# Patient Record
Sex: FEMALE | Race: WHITE | Employment: FULL TIME | URBAN - METROPOLITAN AREA
[De-identification: names, ages, dates, MRNs, and addresses within clinical notes are randomized per-mention and may not be internally consistent; named-entity substitution may affect disease eponyms.]

---

## 2017-05-16 DIAGNOSIS — E05.00 GRAVES DISEASE: ICD-10-CM

## 2017-05-16 RX ORDER — LEVOTHYROXINE SODIUM 175 UG/1
TABLET ORAL
Qty: 90 TABLET | Refills: 0 | Status: SHIPPED | OUTPATIENT
Start: 2017-05-16 | End: 2017-10-19

## 2017-05-16 NOTE — TELEPHONE ENCOUNTER
Levothyroxine     Last Written Prescription Date: 11/22/16  Last Quantity: 90, # refills: 0  Last Office Visit with G, P or [facility] prescribing provider: 11/22/16        TSH   Date Value Ref Range Status   11/22/2016 11.99 (H) 0.40 - 4.00 mU/L Final     Last refill should have only lasted until end of February 2017.    Routing refill request to provider for review/approval because:  Labs out of range:  TSH 11.99

## 2017-10-19 DIAGNOSIS — E05.00 GRAVES DISEASE: ICD-10-CM

## 2017-10-20 RX ORDER — LEVOTHYROXINE SODIUM 175 UG/1
TABLET ORAL
Qty: 90 TABLET | Refills: 0 | Status: SHIPPED | OUTPATIENT
Start: 2017-10-20 | End: 2018-01-20

## 2017-10-20 NOTE — TELEPHONE ENCOUNTER
Levothyroxine       Last Written Prescription Date:  5/16/17  Last Fill Quantity: 90,   # refills: 0  Future Office visit:       Routing refill request to provider for review/approval because:  Thyroid Protocol Failed

## 2018-01-10 ENCOUNTER — DOCUMENTATION ONLY (OUTPATIENT)
Dept: SURGERY | Facility: CLINIC | Age: 48
End: 2018-01-10

## 2018-01-20 DIAGNOSIS — E05.00 GRAVES DISEASE: ICD-10-CM

## 2018-01-24 NOTE — TELEPHONE ENCOUNTER
"Requested Prescriptions   Pending Prescriptions Disp Refills     levothyroxine (SYNTHROID/LEVOTHROID) 175 MCG tablet [Pharmacy Med Name: LEVOTHYROXINE 175 MCG TABLET] 90 tablet 0     Sig: **MAKE APPT, NO MORE REFILLS** TAKE 1 TABLET (175 MCG) BY MOUTH DAILY    Thyroid Protocol Failed    1/23/2018  5:25 PM       Failed - Recent or future visit with authorizing provider's specialty    Patient had office visit in the last year or has a visit in the next 30 days with authorizing provider.  See \"Patient Info\" tab in inbasket, or \"Choose Columns\" in Meds & Orders section of the refill encounter.            Failed - Normal TSH on file in past 12 months    Recent Labs   Lab Test  11/22/16   1224   TSH  11.99*             Passed - Patient is 12 years or older       Passed - No active pregnancy on record    If patient is pregnant or has had a positive pregnancy test, please check TSH.         Passed - No positive pregnancy test in past 12 months    If patient is pregnant or has had a positive pregnancy test, please check TSH.            Pt is over-due for OV and labs.  Was given 1 refill 10-20-17 with note: make appt, no more refills.    Left message for pt to call back.    Will refill x 1 mo after appt scheduled.  "

## 2018-01-31 RX ORDER — LEVOTHYROXINE SODIUM 175 UG/1
TABLET ORAL
Qty: 30 TABLET | Refills: 0 | Status: SHIPPED | OUTPATIENT
Start: 2018-01-31 | End: 2018-06-12

## 2018-01-31 NOTE — TELEPHONE ENCOUNTER
Routing refill request to provider for review/approval because:  Johanna given x1 and patient did not follow up, please advise  Labs out of range:  tsh  Labs not current:  tsh  Patient needs to be seen because it has been more than 1 year since last office visit.

## 2018-06-12 ENCOUNTER — OFFICE VISIT (OUTPATIENT)
Dept: INTERNAL MEDICINE | Facility: CLINIC | Age: 48
End: 2018-06-12
Payer: COMMERCIAL

## 2018-06-12 VITALS
DIASTOLIC BLOOD PRESSURE: 68 MMHG | SYSTOLIC BLOOD PRESSURE: 108 MMHG | HEART RATE: 77 BPM | HEIGHT: 66 IN | BODY MASS INDEX: 29.59 KG/M2 | WEIGHT: 184.1 LBS | OXYGEN SATURATION: 100 % | RESPIRATION RATE: 16 BRPM | TEMPERATURE: 98.1 F

## 2018-06-12 DIAGNOSIS — Z00.00 ENCOUNTER FOR ROUTINE ADULT HEALTH EXAMINATION WITHOUT ABNORMAL FINDINGS: Primary | ICD-10-CM

## 2018-06-12 DIAGNOSIS — M72.2 PLANTAR FASCIITIS: ICD-10-CM

## 2018-06-12 DIAGNOSIS — F51.01 PRIMARY INSOMNIA: ICD-10-CM

## 2018-06-12 DIAGNOSIS — E05.00 GRAVES DISEASE: ICD-10-CM

## 2018-06-12 DIAGNOSIS — F32.0 MILD MAJOR DEPRESSION (H): ICD-10-CM

## 2018-06-12 LAB
BASOPHILS # BLD AUTO: 0 10E9/L (ref 0–0.2)
BASOPHILS NFR BLD AUTO: 0.4 %
DIFFERENTIAL METHOD BLD: ABNORMAL
EOSINOPHIL # BLD AUTO: 0.3 10E9/L (ref 0–0.7)
EOSINOPHIL NFR BLD AUTO: 3.7 %
ERYTHROCYTE [DISTWIDTH] IN BLOOD BY AUTOMATED COUNT: 15.2 % (ref 10–15)
HCT VFR BLD AUTO: 36.3 % (ref 35–47)
HGB BLD-MCNC: 11.5 G/DL (ref 11.7–15.7)
LYMPHOCYTES # BLD AUTO: 2.1 10E9/L (ref 0.8–5.3)
LYMPHOCYTES NFR BLD AUTO: 30.1 %
MCH RBC QN AUTO: 27.4 PG (ref 26.5–33)
MCHC RBC AUTO-ENTMCNC: 31.7 G/DL (ref 31.5–36.5)
MCV RBC AUTO: 87 FL (ref 78–100)
MONOCYTES # BLD AUTO: 0.5 10E9/L (ref 0–1.3)
MONOCYTES NFR BLD AUTO: 6.6 %
NEUTROPHILS # BLD AUTO: 4.1 10E9/L (ref 1.6–8.3)
NEUTROPHILS NFR BLD AUTO: 59.2 %
PLATELET # BLD AUTO: 232 10E9/L (ref 150–450)
RBC # BLD AUTO: 4.19 10E12/L (ref 3.8–5.2)
T3 SERPL-MCNC: 65 NG/DL (ref 60–181)
WBC # BLD AUTO: 6.9 10E9/L (ref 4–11)

## 2018-06-12 PROCEDURE — 84439 ASSAY OF FREE THYROXINE: CPT | Performed by: INTERNAL MEDICINE

## 2018-06-12 PROCEDURE — 84480 ASSAY TRIIODOTHYRONINE (T3): CPT | Performed by: INTERNAL MEDICINE

## 2018-06-12 PROCEDURE — 36415 COLL VENOUS BLD VENIPUNCTURE: CPT | Performed by: INTERNAL MEDICINE

## 2018-06-12 PROCEDURE — 80053 COMPREHEN METABOLIC PANEL: CPT | Performed by: INTERNAL MEDICINE

## 2018-06-12 PROCEDURE — 84443 ASSAY THYROID STIM HORMONE: CPT | Performed by: INTERNAL MEDICINE

## 2018-06-12 PROCEDURE — 99396 PREV VISIT EST AGE 40-64: CPT | Performed by: INTERNAL MEDICINE

## 2018-06-12 PROCEDURE — 85025 COMPLETE CBC W/AUTO DIFF WBC: CPT | Performed by: INTERNAL MEDICINE

## 2018-06-12 PROCEDURE — 80061 LIPID PANEL: CPT | Performed by: INTERNAL MEDICINE

## 2018-06-12 RX ORDER — FLUOXETINE 10 MG/1
10 CAPSULE ORAL DAILY
Qty: 90 CAPSULE | Refills: 1 | Status: SHIPPED | OUTPATIENT
Start: 2018-06-12 | End: 2018-10-29

## 2018-06-12 RX ORDER — LEVOTHYROXINE SODIUM 175 UG/1
TABLET ORAL
Qty: 90 TABLET | Refills: 0 | Status: SHIPPED | OUTPATIENT
Start: 2018-06-12 | End: 2019-01-03

## 2018-06-12 RX ORDER — ZOLPIDEM TARTRATE 5 MG/1
5 TABLET ORAL
Qty: 30 TABLET | Refills: 5 | Status: SHIPPED | OUTPATIENT
Start: 2018-06-12 | End: 2020-07-01

## 2018-06-12 ASSESSMENT — ANXIETY QUESTIONNAIRES
5. BEING SO RESTLESS THAT IT IS HARD TO SIT STILL: NOT AT ALL
GAD7 TOTAL SCORE: 7
7. FEELING AFRAID AS IF SOMETHING AWFUL MIGHT HAPPEN: SEVERAL DAYS
2. NOT BEING ABLE TO STOP OR CONTROL WORRYING: SEVERAL DAYS
6. BECOMING EASILY ANNOYED OR IRRITABLE: MORE THAN HALF THE DAYS
1. FEELING NERVOUS, ANXIOUS, OR ON EDGE: SEVERAL DAYS
3. WORRYING TOO MUCH ABOUT DIFFERENT THINGS: SEVERAL DAYS
IF YOU CHECKED OFF ANY PROBLEMS ON THIS QUESTIONNAIRE, HOW DIFFICULT HAVE THESE PROBLEMS MADE IT FOR YOU TO DO YOUR WORK, TAKE CARE OF THINGS AT HOME, OR GET ALONG WITH OTHER PEOPLE: SOMEWHAT DIFFICULT
GAD7 TOTAL SCORE: 7
6. BECOMING EASILY ANNOYED OR IRRITABLE: MORE THAN HALF THE DAYS
1. FEELING NERVOUS, ANXIOUS, OR ON EDGE: SEVERAL DAYS
2. NOT BEING ABLE TO STOP OR CONTROL WORRYING: SEVERAL DAYS
7. FEELING AFRAID AS IF SOMETHING AWFUL MIGHT HAPPEN: SEVERAL DAYS
IF YOU CHECKED OFF ANY PROBLEMS ON THIS QUESTIONNAIRE, HOW DIFFICULT HAVE THESE PROBLEMS MADE IT FOR YOU TO DO YOUR WORK, TAKE CARE OF THINGS AT HOME, OR GET ALONG WITH OTHER PEOPLE: SOMEWHAT DIFFICULT
3. WORRYING TOO MUCH ABOUT DIFFERENT THINGS: SEVERAL DAYS
5. BEING SO RESTLESS THAT IT IS HARD TO SIT STILL: NOT AT ALL

## 2018-06-12 ASSESSMENT — PATIENT HEALTH QUESTIONNAIRE - PHQ9
5. POOR APPETITE OR OVEREATING: SEVERAL DAYS
5. POOR APPETITE OR OVEREATING: SEVERAL DAYS

## 2018-06-12 NOTE — MR AVS SNAPSHOT
After Visit Summary   6/12/2018    Carlie Brumfield    MRN: [id number]           Patient Information     Date Of Birth          1970        Visit Information        Provider Department      6/12/2018 11:00 AM Eulalia Broussard MD [facility]        Today's Diagnoses     Encounter for routine adult health examination without abnormal findings    -  1    Mild major depression (H)        Graves disease        Primary insomnia        Plantar fasciitis          Care Instructions    Kaitlynn- therapist    CALM and PACIFICA      Foot exercises- 2 times per day and ice foot twice per day  Supportive shoes  Do not go barefoot in the house  1 month    Preventive Health Recommendations  Female Ages 50 - 64    Yearly exam: See your health care provider every year in order to  o Review health changes.   o Discuss preventive care.    o Review your medicines if your doctor has prescribed any.      Get a Pap test every three years (unless you have an abnormal result and your provider advises testing more often).    If you get Pap tests with HPV test, you only need to test every 5 years, unless you have an abnormal result.     You do not need a Pap test if your uterus was removed (hysterectomy) and you have not had cancer.    You should be tested each year for STDs (sexually transmitted diseases) if you're at risk.     Have a mammogram every 1 to 2 years.    Have a colonoscopy at age 50, or have a yearly FIT test (stool test). These exams screen for colon cancer.      Have a cholesterol test every 5 years, or more often if advised.    Have a diabetes test (fasting glucose) every three years. If you are at risk for diabetes, you should have this test more often.     If you are at risk for osteoporosis (brittle bone disease), think about having a bone density scan (DEXA).    Shots: Get a flu shot each year. Get a tetanus shot every 10 years.    Nutrition:     Eat at least 5 servings of fruits and  vegetables each day.    Eat whole-grain bread, whole-wheat pasta and brown rice instead of white grains and rice.    Talk to your provider about Calcium and Vitamin D.     Lifestyle    Exercise at least 150 minutes a week (30 minutes a day, 5 days a week). This will help you control your weight and prevent disease.    Limit alcohol to one drink per day.    No smoking.     Wear sunscreen to prevent skin cancer.     See your dentist every six months for an exam and cleaning.    See your eye doctor every 1 to 2 years.            Follow-ups after your visit        Additional Services     GASTROENTEROLOGY ADULT REF PROCEDURE ONLY [facility]al [phone]       Last Lab Result: Creatinine (mg/dL)       Date                     Value                 11/22/2016               0.83             ----------  Body mass index is 29.87 kg/(m^2).     Needed:  No  Language:  English    Patient will be contacted to schedule procedure.     Please be aware that coverage of these services is subject to the terms and limitations of your health insurance plan.  Call member services at your health plan with any benefit or coverage questions.  Any procedures must be performed at a [facility] facility OR coordinated by your clinic's referral office.    Please bring the following with you to your appointment:    (1) Any X-Rays, CTs or MRIs which have been performed.  Contact the facility where they were done to arrange for  prior to your scheduled appointment.    (2) List of current medications   (3) This referral request   (4) Any documents/labs given to you for this referral            ORTHO  REFERRAL       [facility] is referring you to the Orthopedic  Services at [facility].       The  Representative will assist you in the coordination of your Orthopedic and Musculoskeletal Care as prescribed by your physician.    The  Representative will call  you within 1 business day to help schedule your appointment, or you may contact the  Representative at:    All areas ~ [phone]     Type of Referral : [facility]       Timeframe requested: Routine    Coverage of these services is subject to the terms and limitations of your health insurance plan.  Please call member services at your health plan with any benefit or coverage questions.      If X-rays, CT or MRI's have been performed, please contact the facility where they were done to arrange for , prior to your scheduled appointment.  Please bring this referral request to your appointment and present it to your specialist.                  Future tests that were ordered for you today     Open Future Orders        Priority Expected Expires Ordered    *MA Screening Digital Bilateral Routine  6/12/2019 6/12/2018            Who to contact     If you have questions or need follow up information about today's clinic visit or your schedule please contact [facility] directly at [phone].  Normal or non-critical lab and imaging results will be communicated to you by MyChart, letter or phone within 4 business days after the clinic has received the results. If you do not hear from us within 7 days, please contact the clinic through LiquidSpacehart or phone. If you have a critical or abnormal lab result, we will notify you by phone as soon as possible.  Submit refill requests through P[employer]l or call your pharmacy and they will forward the refill request to us. Please allow 3 business days for your refill to be completed.          Additional Information About Your Visit        LiquidSpaceharTakes Information     Prowl gives you secure access to your electronic health record. If you see a primary care provider, you can also send messages to your care team and make appointments. If you have questions, please call your primary care clinic.  If you do not have a primary care  "provider, please call [phone] and they will assist you.        Care EveryWhere ID     This is your Care EveryWhere ID. This could be used by other organizations to access your [place] medical records  [id number]        Your Vitals Were     Pulse Temperature Respirations Height Last Period Pulse Oximetry    77 98.1  F (36.7  C) (Oral) 16 5' 5.83\" (1.672 m) 06/02/2011 100%    BMI (Body Mass Index)                   29.87 kg/m2            Blood Pressure from Last 3 Encounters:   06/12/18 108/68   11/22/16 110/70   03/21/16 108/68    Weight from Last 3 Encounters:   06/12/18 184 lb 1.6 oz (83.5 kg)   11/22/16 155 lb (70.3 kg)   03/21/16 158 lb (71.7 kg)              We Performed the Following     CBC with platelets differential     Comprehensive metabolic panel     GA[facility]OLOG[facility] ADULT REF PROCEDURE ONLY [facility] [phone]     Lipid panel reflex to direct LDL Fasting     ORTHO  REFERRAL     T3 total     T4 free     TSH          Today's Medication Changes          These changes are accurate as of 6/12/18 12:00 PM.  If you have any questions, ask your nurse or doctor.               Start taking these medicines.        Dose/Directions    FLUoxetine 10 MG capsule   Commonly known as:  PROzac   Used for:  Mild major depression (H)   Started by:  Eulalia Broussard MD        Dose:  10 mg   Take 1 capsule (10 mg) by mouth daily   Quantity:  90 capsule   Refills:  1            Where to get your medicines      These medications [facility]/pharmacy #[id number] [facility] [address] [facility] [address]  [address] [address] [facility] [address]     Phone:  [phone]     FLUoxetine 10 MG capsule    levothyroxine 175 MCG tablet         Some of these will need a paper prescription and others can be bought over the counter.  Ask your nurse if you have questions.     Bring a paper prescription for each of these medications     zolpidem 5 MG tablet                Primary Care Provider Office Phone # Fax " #    Eulalia Broussard -[phone]       [address] [facility] [address]        Equal Access to Services     MARIA DE JESUS QUINTANA : Humera ronak solano carmel Duarte, wajigarda marcilei, patyta kadominiqueda naren, graham tello laConorestrada coleman. So [facility] [phone].    ATENCIÓN: Si habla español, tiene a guevara disposición servicios gratuitos de asistencia lingüística. Llame al [phone].    We comply with applicable federal civil rights laws and [place] laws. We do not discriminate on the basis of race, color, national origin, age, disability, sex, sexual orientation, or gender identity.            Thank you!     Thank you for choosing [facility]  for your care. Our goal is always to provide you with excellent care. Hearing back from our patients is one way we can continue to improve our services. Please take a few minutes to complete the written survey that you may receive in the mail after your visit with us. Thank you!             Your Updated Medication List - Protect others around you: Learn how to safely use, store and throw away your medicines at www.disposemymeds.org.          This list is accurate as of 6/12/18 12:00 PM.  Always use your most recent med list.                   Brand Name Dispense Instructions for use Diagnosis    CALCI-CHEW PO      Take  by mouth.        FLINTSTONES COMPLETE PO      Take  by mouth.        FLUoxetine 10 MG capsule    PROzac    90 capsule    Take 1 capsule (10 mg) by mouth daily    Mild major depression (H)       levothyroxine 175 MCG tablet    SYNTHROID/LEVOTHROID    90 tablet    **MAKE APPT, NO MORE REFILLS** TAKE 1 TABLET (175 MCG) BY MOUTH DAILY    Graves disease       order for DME     1 each    Equipment being ordered: Heel/elbow protectors    Irritation of ulnar nerve       Vitamin C 500 MG Caps      Take  by mouth.        vitamin D 1000 units capsule      Take 1 capsule by mouth daily.        zolpidem 5 MG tablet    AMBIEN     30 tablet    Take 1 tablet (5 mg) by mouth nightly as needed for sleep    Primary insomnia

## 2018-06-12 NOTE — PATIENT INSTRUCTIONS
Kaitlynn- therapist    CALM and PACIFICA      Foot exercises- 2 times per day and ice foot twice per day  Supportive shoes  Do not go barefoot in the house  1 month    Preventive Health Recommendations  Female Ages 50 - 64    Yearly exam: See your health care provider every year in order to  o Review health changes.   o Discuss preventive care.    o Review your medicines if your doctor has prescribed any.      Get a Pap test every three years (unless you have an abnormal result and your provider advises testing more often).    If you get Pap tests with HPV test, you only need to test every 5 years, unless you have an abnormal result.     You do not need a Pap test if your uterus was removed (hysterectomy) and you have not had cancer.    You should be tested each year for STDs (sexually transmitted diseases) if you're at risk.     Have a mammogram every 1 to 2 years.    Have a colonoscopy at age 50, or have a yearly FIT test (stool test). These exams screen for colon cancer.      Have a cholesterol test every 5 years, or more often if advised.    Have a diabetes test (fasting glucose) every three years. If you are at risk for diabetes, you should have this test more often.     If you are at risk for osteoporosis (brittle bone disease), think about having a bone density scan (DEXA).    Shots: Get a flu shot each year. Get a tetanus shot every 10 years.    Nutrition:     Eat at least 5 servings of fruits and vegetables each day.    Eat whole-grain bread, whole-wheat pasta and brown rice instead of white grains and rice.    Talk to your provider about Calcium and Vitamin D.     Lifestyle    Exercise at least 150 minutes a week (30 minutes a day, 5 days a week). This will help you control your weight and prevent disease.    Limit alcohol to one drink per day.    No smoking.     Wear sunscreen to prevent skin cancer.     See your dentist every six months for an exam and cleaning.    See your eye doctor every 1 to 2  years.

## 2018-06-12 NOTE — PROGRESS NOTES
SUBJECTIVE:   CC: Carlie rBumfield is an 48 year old woman who presents for preventive health visit.     Physical   Annual:     Getting at least 3 servings of Calcium per day::  NO    Bi-annual eye exam::  NO    Dental care twice a year::  NO    Sleep apnea or symptoms of sleep apnea::  Daytime drowsiness    Diet::  Regular (no restrictions)    Frequency of exercise::  2-3 days/week    Duration of exercise::  30-45 minutes    Taking medications regularly::  Yes    Medication side effects::  Lightheadedness    Additional concerns today::  YES                Today's PHQ-2 Score:   PHQ-2 ( 1999 [employer]) 6/12/2018   Q1: Little interest or pleasure in doing things 1   Q2: Feeling down, depressed or hopeless 1   PHQ-2 Score 2   Q1: Little interest or pleasure in doing things Several days   Q2: Feeling down, depressed or hopeless Several days   PHQ-2 Score 2       Abuse: Current or Past(Physical, Sexual or Emotional)- No  Do you feel safe in your environment - Yes    Social History   Substance Use Topics     Smoking status: Never Smoker     Smokeless tobacco: Never Used     Alcohol use 0.0 oz/week     0 Standard drinks or equivalent per week      Comment: rare     Alcohol Use 6/12/2018   If you drink alcohol do you typically have greater than 3 drinks per day OR greater than 7 drinks per week? No       Reviewed orders with patient.  Reviewed health maintenance and updated orders accordingly - Yes  Labs reviewed in EPIC    Patient age 48, mutual decision reflected in health maintenance.      Pertinent mammograms are reviewed under the imaging tab.  History of abnormal Pap smear: Status post benign hysterectomy. Health Maintenance and Surgical History updated.    Reviewed and updated as needed this visit by clinical staff         Reviewed and updated as needed this visit by Provider      Review of Systems  CONSTITUTIONAL: NEGATIVE for fever, chills, change in weight  INTEGUMENTARU/SKIN: NEGATIVE for worrisome rashes, moles  "or lesions  EYES: NEGATIVE for vision changes or irritation  ENT: NEGATIVE for ear, mouth and throat problems  RESP: NEGATIVE for significant cough or SOB  BREAST: NEGATIVE for masses, tenderness or discharge  CV: NEGATIVE for chest pain, palpitations or peripheral edema  GI: NEGATIVE for nausea, abdominal pain, heartburn, or change in bowel habits  : NEGATIVE for unusual urinary or vaginal symptoms. Periods are regular.  MUSCULOSKELETAL: NEGATIVE for significant arthralgias or myalgia  NEURO: NEGATIVE for weakness, dizziness or paresthesias  PSYCHIATRIC: NEGATIVE for changes in mood or affect     OBJECTIVE:   LMP 06/02/2011   /68 (BP Location: Left arm, Patient Position: Sitting, Cuff Size: Adult Large)  Pulse 77  Temp 98.1  F (36.7  C) (Oral)  Resp 16  Ht 5' 5.83\" (1.672 m)  Wt 184 lb 1.6 oz (83.5 kg)  LMP 06/02/2011  SpO2 100%  BMI 29.87 kg/m2    Physical Exam  GENERAL: healthy, alert and no distress  EYES: Eyes grossly normal to inspection, PERRL and conjunctivae and sclerae normal  HENT: ear canals and TM's normal, nose and mouth without ulcers or lesions  NECK: no adenopathy, no asymmetry, masses, or scars and thyroid normal to palpation  RESP: lungs clear to auscultation - no rales, rhonchi or wheezes  BREAST: normal without masses, tenderness or nipple discharge and no palpable axillary masses or adenopathy  CV: regular rate and rhythm, normal S1 S2, no S3 or S4, no murmur, click or rub, no peripheral edema and peripheral pulses strong  ABDOMEN: soft, nontender, no hepatosplenomegaly, no masses and bowel sounds normal  MS: no gross musculoskeletal defects noted, no edema; pain upon palpation of right heel  SKIN: no suspicious lesions or rashes  NEURO: Normal strength and tone, mentation intact and speech normal  PSYCH: mentation appears normal, affect normal/bright    ASSESSMENT/PLAN:     (Z00.00) Encounter for routine adult health examination without abnormal findings  (primary encounter " "diagnosis)  Comment: fasting  Plan: TSH, T4 free, T3 total, Lipid panel reflex to         direct LDL Fasting, Comprehensive metabolic         panel, CBC with platelets differential,         GASTROENTEROLOGY ADULT REF PROCEDURE ONLY         Mindi Burton [phone], *MA Screening         Digital Bilateral            (F32.0) Mild major depression (H)  Comment: restart  Plan: FLUoxetine (PROZAC) 10 MG capsule        -f/u in one month by phone or evisit    (E05.00) Graves disease  Comment:   Plan: levothyroxine (SYNTHROID/LEVOTHROID) 175 MCG         tablet            (F51.01) Primary insomnia  Comment:   Plan: zolpidem (AMBIEN) 5 MG tablet            (M72.2) Plantar fasciitis  Comment:   Plan: ORTHO MOISES REFERRAL                COUNSELING:  Reviewed preventive health counseling, as reflected in patient instructions         reports that she has never smoked. She has never used smokeless tobacco.    Estimated body mass index is 24.64 kg/(m^2) as calculated from the following:    Height as of 11/22/16: 5' 6.5\" (1.689 m).    Weight as of 11/22/16: 155 lb (70.3 kg).       Counseling Resources:  ATP IV Guidelines  Pooled Cohorts Equation Calculator  Breast Cancer Risk Calculator  FRAX Risk Assessment  ICSI Preventive Guidelines  Dietary Guidelines for Americans, 2010  USDA's MyPlate  ASA Prophylaxis  Lung CA Screening    Eulalia Broussard MD  [facility]  Answers for HPI/ROS submitted by the patient on 6/12/2018   PHQ-2 Score: 2    "

## 2018-06-13 LAB
ALBUMIN SERPL-MCNC: 3.8 G/DL (ref 3.4–5)
ALP SERPL-CCNC: 77 U/L (ref 40–150)
ALT SERPL W P-5'-P-CCNC: 23 U/L (ref 0–50)
ANION GAP SERPL CALCULATED.3IONS-SCNC: 11 MMOL/L (ref 3–14)
AST SERPL W P-5'-P-CCNC: 25 U/L (ref 0–45)
BILIRUB SERPL-MCNC: 0.2 MG/DL (ref 0.2–1.3)
BUN SERPL-MCNC: 12 MG/DL (ref 7–30)
CALCIUM SERPL-MCNC: 8.7 MG/DL (ref 8.5–10.1)
CHLORIDE SERPL-SCNC: 104 MMOL/L (ref 94–109)
CHOLEST SERPL-MCNC: 227 MG/DL
CO2 SERPL-SCNC: 23 MMOL/L (ref 20–32)
CREAT SERPL-MCNC: 0.89 MG/DL (ref 0.52–1.04)
GFR SERPL CREATININE-BSD FRML MDRD: 67 ML/MIN/1.7M2
GLUCOSE SERPL-MCNC: 69 MG/DL (ref 70–99)
HDLC SERPL-MCNC: 89 MG/DL
LDLC SERPL CALC-MCNC: 116 MG/DL
NONHDLC SERPL-MCNC: 138 MG/DL
POTASSIUM SERPL-SCNC: 4.2 MMOL/L (ref 3.4–5.3)
PROT SERPL-MCNC: 7.5 G/DL (ref 6.8–8.8)
SODIUM SERPL-SCNC: 138 MMOL/L (ref 133–144)
T4 FREE SERPL-MCNC: 0.84 NG/DL (ref 0.76–1.46)
TRIGL SERPL-MCNC: 108 MG/DL
TSH SERPL DL<=0.005 MIU/L-ACNC: 75.52 MU/L (ref 0.4–4)

## 2018-06-13 ASSESSMENT — ANXIETY QUESTIONNAIRES: GAD7 TOTAL SCORE: 7

## 2018-06-13 ASSESSMENT — PATIENT HEALTH QUESTIONNAIRE - PHQ9: SUM OF ALL RESPONSES TO PHQ QUESTIONS 1-9: 12

## 2018-07-10 ENCOUNTER — HOSPITAL ENCOUNTER (OUTPATIENT)
Facility: CLINIC | Age: 48
Discharge: HOME | End: 2018-07-10
Attending: INTERNAL MEDICINE | Admitting: INTERNAL MEDICINE
Payer: COMMERCIAL

## 2018-07-10 VITALS
SYSTOLIC BLOOD PRESSURE: 103 MMHG | OXYGEN SATURATION: 96 % | RESPIRATION RATE: 20 BRPM | DIASTOLIC BLOOD PRESSURE: 65 MMHG

## 2018-07-10 LAB — COLONOSCOPY: NORMAL

## 2018-07-10 PROCEDURE — 25000128 H RX IP 250 OP 636: Performed by: INTERNAL MEDICINE

## 2018-07-10 PROCEDURE — G0500 MOD SEDAT ENDO SERVICE >5YRS: HCPCS | Performed by: INTERNAL MEDICINE

## 2018-07-10 PROCEDURE — G0121 COLON CA SCRN NOT HI RSK IND: HCPCS | Performed by: INTERNAL MEDICINE

## 2018-07-10 PROCEDURE — 45378 DIAGNOSTIC COLONOSCOPY: CPT | Performed by: INTERNAL MEDICINE

## 2018-07-10 RX ORDER — ONDANSETRON 2 MG/ML
4 INJECTION INTRAMUSCULAR; INTRAVENOUS EVERY 6 HOURS PRN
Status: DISCONTINUED | OUTPATIENT
Start: 2018-07-10 | End: 2018-07-10 | Stop reason: HOSPADM

## 2018-07-10 RX ORDER — FLUMAZENIL 0.1 MG/ML
0.2 INJECTION, SOLUTION INTRAVENOUS
Status: DISCONTINUED | OUTPATIENT
Start: 2018-07-10 | End: 2018-07-10 | Stop reason: HOSPADM

## 2018-07-10 RX ORDER — NALOXONE HYDROCHLORIDE 0.4 MG/ML
.1-.4 INJECTION, SOLUTION INTRAMUSCULAR; INTRAVENOUS; SUBCUTANEOUS
Status: DISCONTINUED | OUTPATIENT
Start: 2018-07-10 | End: 2018-07-10 | Stop reason: HOSPADM

## 2018-07-10 RX ORDER — FENTANYL CITRATE 50 UG/ML
INJECTION, SOLUTION INTRAMUSCULAR; INTRAVENOUS PRN
Status: DISCONTINUED | OUTPATIENT
Start: 2018-07-10 | End: 2018-07-10 | Stop reason: HOSPADM

## 2018-07-10 RX ORDER — LIDOCAINE 40 MG/G
CREAM TOPICAL
Status: DISCONTINUED | OUTPATIENT
Start: 2018-07-10 | End: 2018-07-10 | Stop reason: HOSPADM

## 2018-07-10 RX ORDER — ONDANSETRON 4 MG/1
4 TABLET, ORALLY DISINTEGRATING ORAL EVERY 6 HOURS PRN
Status: DISCONTINUED | OUTPATIENT
Start: 2018-07-10 | End: 2018-07-10 | Stop reason: HOSPADM

## 2018-07-10 RX ORDER — ONDANSETRON 2 MG/ML
4 INJECTION INTRAMUSCULAR; INTRAVENOUS
Status: DISCONTINUED | OUTPATIENT
Start: 2018-07-10 | End: 2018-07-10 | Stop reason: HOSPADM

## 2018-07-10 NOTE — LETTER
June 27, 2018      Carlie Brumfield  [address] [facility] [address]        Dear Carlie,       Thank you for choosing [facility]. You are scheduled for the following service.     Date:  7-10-18             Procedure:  COLONOSCOPY  Doctor:        John   Arrival Time:  1200  *Check in at Emergency/Endoscopy desk*  Procedure Time:  1230    Location:   [facility]        Endoscopy Department, First Floor (Enter through ER Doors) *        [address] [place], [place] [address]      [phone] or [phone] () to reschedule      MIRALAX -GATORADE  PREP  Colonoscopy is the most accurate test to detect colon polyps and colon cancer; and the only test where polyps can be removed. During this procedure, a doctor examines the lining of your large intestine and rectum through a flexible tube.           Transportation  Arrange for a ride for the day of your procedure with a responsible adult.  A taxi ride is not an option unless you are accompanied by a responsible adult. If you fail to arrange transportation with a responsible adult, your procedure will be cancelled and rescheduled.    Purchase the  following supplies at your local pharmacy:  - 2 (two) bisacodyl tablets: each tablet contains 5 mg.  (Dulcolax  laxative NOT Dulcolax  stool softener)   - 1 (one) 8.3 oz bottle of Polyethylene Glycol (PEG) 3350 Powder   (MiraLAX , Smooth LAX , ClearLAX  or equivalent)  - 64 oz Gatorade    Regular Gatorade, Gatorade G2 , Powerade , Powerade Zero  or Pedialyte  is acceptable. Red colored flavors are not allowed; all other colors (yellow, green, orange, purple and blue) are okay. It is also okay to buy two 2.12 oz packets of powdered Gatorade that can be mixed with water to a total volume of 64 oz of liquid.  - 1 (one) 10 oz bottle of Magnesium Citrate (Red colored flavors are not allowed)  It is also okay for you to use a 0.5 oz package of powdered  magnesium citrate (17 g) mixed with 10 oz of water.    PREPARATION FOR COLONOSCOPY    7 days before:    Discontinue fiber supplements and medications containing iron. This includes Metamucil  and Fibercon ; and multivitamins with iron.  3 days before:    Begin a low-fiber diet. A low-fiber diet helps making the cleanout more effective.     Examples of a low-fiber diet include (but are not limited to): white bread, white rice, pasta, crackers, fish, chicken, eggs, ground beef, creamy peanut butter, cooked/steamed/boiled vegetables, canned fruit, bananas, melons, milk, plain yogurt cheese, salad dressing and other condiments.     The following are not allowed on a low-fiber diet: seeds, nuts, popcorn, bran, whole wheat, corn, quinoa, raw fruits and vegetables, berries and dried fruit, beans and lentils.    For additional details on low-fiber diet, please refer to the table on the last page.  2 days before:    Continue the low-fiber diet.     Drink at least 8 glasses of water throughout the day.     Stop eating solid foods at 11:45 pm.  1 day before:    In the morning: begin a clear liquid diet (liquids you can see through).     Examples of a clear liquid diet include: water, clear broth or bouillon, Gatorade, Pedialyte or Powerade, carbonated and non-carbonated soft drinks (Sprite , 7-Up , ginger ale), strained fruit juices without pulp (apple, white grape, white cranberry), Jell-O  and popsicles.     The following are not allowed on a clear liquid diet: red liquids, alcoholic beverages, dairy products (milk, creamer, and yogurt), protein shakes, creamy broths, juice with pulp and chewing tobacco.    At noon: take 2 (two) bisacodyl tablets     At 4 (and no later than 6pm): start drinking the Miralax-Gatorade preparation (8.3 oz of Miralax mixed with 64 oz of Gatorade in a large pitcher). Drink 1(one) 8 oz glass every 15 minutes thereafter, until the mixture is gone.    COLON CLEANSING TIPS: drink adequate amounts of  fluids before and after your colon cleansing to prevent dehydration. Stay near a toilet because you will have diarrhea. Even if you are sitting on the toilet, continue to drink the cleansing solution every 15 minutes. If you feel nauseous or vomit, rinse your mouth with water, take a 15 to 30-minute-break and then continue drinking the solution. You will be uncomfortable until the stool has flushed from your colon (in about 2 to 4 hours). You may feel chilled.              Day of your procedure  You may take all of your morning medications including blood pressure medications, blood thinners (if you have not been instructed to stop these by our office), methadone, anti-seizure medications with sips of water 3 hours prior to your procedure or earlier. Do not take insulin or vitamins prior to your procedure. Continue the clear liquid diet.   4 hours prior: drink 10 oz of magnesium citrate. It may be easier to drink it with a straw.    STOP consuming all liquids after that.     Do not take anything by mouth during this time.     Allow extra time to travel to your procedure as you may need to stop and use a restroom along the way.  You are ready for the procedure, if you followed all instructions and your stool is no longer formed, but clear or yellow liquid. If you are unsure whether your colon is clean, please call our office at [phone] before you leave for your appointment.  Bring the following to your procedure:  - Insurance Card/Photo ID.   - List of current medications including over-the-counter medications and supplements.   - Your rescue inhaler if you currently use one to control asthma.      Canceling or rescheduling your appointment:   If you must cancel or reschedule your appointment, please call [phone] as soon as possible.      COLONOSCOPY PRE-PROCEDURE CHECKLIST  If you have diabetes, ask your regular doctor for diet and medication restrictions.  If you take an anticoagulant or anti-platelet  medication (such as Coumadin , Lovenox , Pradaxa , Xarelto , Eliquis , etc.), please call your primary doctor for advice on holding this medication.  If you take aspirin you may continue to do so.  If you are or may be pregnant, please discuss the risks and benefits of this procedure with your doctor.          What happens during a colonoscopy?    Plan to spend up to two hours, starting at registration time, at the endoscopy center the day of your procedure. The colonoscopy takes an average of 15 to 30 minutes. Recovery time is about 30 minutes.    Before the exam:    You will change into a gown.    Your medical history and medication list will be reviewed with you, unless that has been done over the phone prior to the procedure.     A nurse will insert an intravenous (IV) line into your hand or arm.    The doctor will meet with you and will give you a consent form to sign.    During the exam:     Medicine will be given through the IV line to help you relax.     Your heart rate and oxygen levels will be monitored. If your blood pressure is low, you may be given fluids through the IV line.     The doctor will insert a flexible hollow tube, called a colonoscope, into your rectum. The scope will be advanced slowly through the large intestine (colon).    You may have a feeling of fullness or pressure.     If an abnormal tissue or a polyp is found, the doctor may remove it through the endoscope for closer examination, or biopsy. Tissue removal is painless    After the exam:           Any tissue samples removed during the exam will be sent to a lab for evaluation. It may take 5-7 working days for you to be notified of the results.     A nurse will provide you with complete discharge instructions before you leave the endoscopy center. Be sure to ask the nurse for specific instructions if you take blood thinners such as Aspirin, Coumadin or Plavix.     The doctor will prepare a full report for you and for the physician who  referred you for the procedure.     Your doctor will talk with you about the initial results of your exam.      Medication given during the exam will prohibit you from driving for the rest of the day.     Following the exam, you may resume your normal diet. Your first meal should be light, no greasy foods. Avoid alcohol until the next day.     You may resume your regular activities the day after the procedure.     LOW-FIBER DIET    Foods RECOMMENDED Foods to AVOID   Breads, Cereal, Rice and Pasta:   White bread, rolls, biscuits, croissant and lesa toast.   Waffles, Tu[place] toast and pancakes.   White rice, noodles, pasta, macaroni and peeled cooked potatoes.   Plain crackers and saltines.   Cooked cereals: farina, cream of rice.   Cold cereals: Puffed Rice , Rice Krispies , Corn Flakes  and Special K    Breads, Cereal, Rice and Pasta:   Breads or rolls with nuts, seeds or fruit.   Whole wheat, pumpernickel, rye breads and cornbread.   Potatoes with skin, brown or wild rice, and kasha (buckwheat).     Vegetables:   Tender cooked and canned vegetables without seeds: carrots, asparagus tips, green or wax beans, pumpkin, spinach, lima beans. Vegetables:   Raw or steamed vegetables.   Vegetables with seeds.   Sauerkraut.   Winter squash, peas, broccoli, Brussel sprouts, cabbage, onions, cauliflower, baked beans, peas and corn.   Fruits:   Strained fruit juice.   Canned fruit, except pineapple.   Ripe bananas and melon. Fruits:   Prunes and prune juice.   Raw fruits.   Dried fruits: figs, dates and raisins.   Milk/Dairy:   Milk: plain or flavored.   Yogurt, custard and ice cream.   Cheese and cottage cheese Milk/Dairy:     Meat and other proteins:   ground, well-cooked tender beef, lamb, ham, veal, pork, fish, poultry and organ meats.   Eggs.   Peanut butter without nuts. Meat and other proteins:   Tough, fibrous meats with gristle.   Dry beans, peas and lentils.   Peanut butter with nuts.   Tofu.   Fats, Snack, Sweets,  Condiments and Beverages:   Margarine, butter, oils, mayonnaise, sour cream and salad dressing, plain gravy.   Sugar, hard candy, clear jelly, honey and syrup.   Spices, cooked herbs, bouillon, broth and soups made with allowed vegetable, ketchup and mustard.   Coffee, tea and carbonated drinks.   Plain cakes, cookies and pretzels.   Gelatin, plain puddings, custard, ice cream, sherbet and popsicles. Fats, Snack, Sweets, Condiments and Beverages:   Nuts, seeds and coconut.   Jam, marmalade and preserves.   Pickles, olives, relish and horseradish.   All desserts containing nuts, seeds, dried fruit and coconut; or made from whole grains or bran.   Candy made with nuts or seeds.   Popcorn.                     DIRECTIONS TO THE ENDOSCOPY DEPARTMENT     From the north ([facility])  Take [address] South, exit on [address] [address]. Get into the left hand adrianna, turn left (east), go one-half mile to [address] and turn left. Go north to the first stoplight, take a right on [address] and follow it to the Emergency entrance.    From the south ([facility])  Take 35N to the [address]E split and exit on [address] [address]. On [address] [address], turn left (west) to [address]. Turn right (north) on [address]. Go north to the first stoplight, take a right on [address] and follow it to the Emergency entrance.    From the east via 35E ([facility])  Take [address] south to [address] [address] exit. Turn right on [address] [address]. Go west to [address]. Turn right (north) on [address]. Go to the first stoplight, take a right and follow on [address] to the Emergency entrance.    From the east via [address] ([facility])  Take [address] to [address]. Turn left (south) on [address] to [address]. Turn left (east) on [address] and follow it to the Emergency entrance.    From the west via [address] ([place])  Take [address] east to [address].  Turn right (south) on [address] to [address]. Turn left (east) on [address] and follow it to the Emergency entrance.

## 2018-07-10 NOTE — H&P
Pre-Endoscopy History and Physical     Carlie Brumfield MRN# [id number]   YOB: 1970 Age: 48 year old     Date of Procedure: 7/10/2018  Primary care provider: Eulalia Broussard  Type of Endoscopy: Colonoscopy with possible biopsy, possible polypectomy  Reason for Procedure: screen  Type of Anesthesia Anticipated: Conscious Sedation    HPI:    Carlie is a 48 year old female who will be undergoing the above procedure.      A history and physical has been performed. The patient's medications and allergies have been reviewed. The risks and benefits of the procedure and the sedation options and risks were discussed with the patient.  All questions were answered and informed consent was obtained.      She denies a personal or family history of anesthesia complications or bleeding disorders.     Patient Active Problem List   Diagnosis     JOINT PAIN-LOWER LEG(aka KNEE)     Insomnia     Graves disease     Hypothyroidism     Mixed hyperlipemia     HYPERLIPIDEMIA LDL GOAL <130     Anemia     Status post gastric bypass for obesity     Mild major depression (H)     Right elbow pain     Elbow pain     Low back pain        Past Medical History:   Diagnosis Date     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    Hospitalized     Unspecified urinary incontinence     Urgency incontinence -- abstracted 7/15/02        Past Surgical History:   Procedure Laterality Date     APPENDECTOMY       C NONSPECIFIC PROCEDURE      2 c-sections     C NONSPECIFIC PROCEDURE      Appendectomy -- abstracted 7/15/02     C NONSPECIFIC PROCEDURE      Left thumb tendon repair after injury     ESOPHAGOSCOPY, GASTROSCOPY, DUODENOSCOPY (EGD), COMBINED       ESOPHAGOSCOPY, GASTROSCOPY, DUODENOSCOPY (EGD), COMBINED N/A 12/18/2015    Procedure: COMBINED ESOPHAGOSCOPY, GASTROSCOPY, DUODENOSCOPY (EGD), BIOPSY SINGLE OR MULTIPLE;  Surgeon: Lino Lopez MD;  Location:  GI     GI SURGERY      bariatric surgery     HYSTERECTOMY, PAP NO  "LONGER INDICATED       ORTHOPEDIC SURGERY         Social History   Substance Use Topics     Smoking status: Never Smoker     Smokeless tobacco: Never Used     Alcohol use 0.0 oz/week     0 Standard drinks or equivalent per week      Comment: rare       Family History   Problem Relation Age of Onset     Osteoperosis Mother      Unknown/Adopted Father        Prior to Admission medications    Medication Sig Start Date End Date Taking? Authorizing Provider   Ascorbic Acid (VITAMIN C) 500 MG CAPS Take  by mouth.    Reported, Patient   Calcium Carbonate (CALCI-CHEW PO) Take  by mouth.    Reported, Patient   Cholecalciferol (VITAMIN D) 1000 UNITS capsule Take 1 capsule by mouth daily.    Reported, Patient   FLUoxetine (PROZAC) 10 MG capsule Take 1 capsule (10 mg) by mouth daily 6/12/18   Eulalia Broussard MD   levothyroxine (SYNTHROID/LEVOTHROID) 175 MCG tablet **MAKE APPT, NO MORE REFILLS** TAKE 1 TABLET (175 MCG) BY MOUTH DAILY 6/12/18   Eulalia Broussard MD   ORDER FOR DME Equipment being ordered: Heel/elbow protectors 2/21/13   Alireza Coronel MD   Pediatric Multivit-Minerals-C (FLINTSTONES COMPLETE PO) Take  by mouth.    Reported, Patient   zolpidem (AMBIEN) 5 MG tablet Take 1 tablet (5 mg) by mouth nightly as needed for sleep 6/12/18   Eulalia Broussard MD       Allergies   Allergen Reactions     No Known Allergies         REVIEW OF SYSTEMS:   5 point ROS negative except as noted above in HPI, including Gen., Resp., CV, GI &  system review.    PHYSICAL EXAM:   [facility] 06/02/2011 Estimated body mass index is 29.87 kg/(m^2) as calculated from the following:    Height as of 6/12/18: 1.672 m (5' 5.83\").    Weight as of 6/12/18: 83.5 kg (184 lb 1.6 oz).   GENERAL APPEARANCE: alert, and oriented  MENTAL STATUS: alert  AIRWAY EXAM: Mallampatti Class I (visualization of the soft palate, fauces, uvula, anterior and posterior pillars)  RESP: lungs clear to auscultation - no rales, rhonchi or wheezes  CV: regular " rates and rhythm  DIAGNOSTICS:    Not indicated    IMPRESSION   ASA Class 2 - Mild systemic disease    PLAN:   Plan for Colonoscopy with possible biopsy, possible polypectomy. We discussed the risks, benefits and alternatives and the patient wished to proceed.    The above has been forwarded to the consulting provider.      Signed Electronically by: Lino Lopez  July 10, 2018

## 2018-07-10 NOTE — IP AVS SNAPSHOT
MRN:[id number]                      After Visit Summary   7/10/2018    Carlie Brumfield    MRN: [id number]           Thank you!     Thank you for choosing [facility] for your care. Our goal is always to provide you with excellent care. Hearing back from our patients is one way we can continue to improve our services. Please take a few minutes to complete the written survey that you may receive in the mail after you visit. If you would like to speak to someone directly about your visit please contact Patient Relations at [phone]. Thank you!          Patient Information     Date Of Birth          1970        About your hospital stay     You were admitted on:  July 10, 2018 You last received care in the:  [facility]    You were discharged on:  July 10, 2018       Who to Call     For medical emergencies, please call 911.  For non-urgent questions about your medical care, please call your primary care provider or clinic, [phone]  For questions related to your surgery, please call your surgery clinic        Attending Provider     Provider Specialty    Lino Lopez MD Gastroenterology       Primary Care Provider Office Phone # Fax #    Eulalia Cliff Broussard [phone]      Further instructions from your care team       The patient has received a copy of the Provation  report the doctor has written and discharge instructions have been discussed with the patient and responsible adult.  All questions were addressed and answered prior to patient discharge.    Pending Results     No orders found from 7/8/2018 to 7/11/2018.            Admission Information     Date & Time Provider Department Dept. Phone    7/10/2018 Lino Lopez MD [facility] [phone]      Your Vitals Were     Blood Pressure Respirations Last Period Pulse Oximetry          120/98 9 06/02/2011 98%        MyChart Information     [employer] gives you secure  access to your electronic health record. If you see a primary care provider, you can also send messages to your care team and make appointments. If you have questions, please call your primary care clinic.  If you do not have a primary care provider, please call [phone] and they will assist you.        Care EveryWhere ID     This is your Care EveryWhere ID. This could be used by other organizations to access your [place] medical records  [id number]        Equal Access to Services     MARIA DE JESUS QUINTANA : Hadii ronak solano hadasho Soomaali, waaxda luqadaha, qaybta kaalmada adeegyada, graham coleman. So [facility] [phone].    ATENCIÓN: Si emmala jhoana, tiene a guevara disposición servicios gratuitos de asistencia lingüística. Giancarloame al [phone].    We comply with applicable federal civil rights laws and [place] laws. We do not discriminate on the basis of race, color, national origin, age, disability, sex, sexual orientation, or gender identity.               Review of your medicines      CONTINUE these medicines which have NOT CHANGED        Dose / Directions    CALCI-CHEW PO        Take  by mouth.   Refills:  0       FLINTSTONES COMPLETE PO        Take  by mouth.   Refills:  0       FLUoxetine 10 MG capsule   Commonly known as:  PROzac   Used for:  Mild major depression (H)        Dose:  10 mg   Take 1 capsule (10 mg) by mouth daily   Quantity:  90 capsule   Refills:  1       levothyroxine 175 MCG tablet   Commonly known as:  SYNTHROID/LEVOTHROID   Used for:  Graves disease        **MAKE APPT, NO MORE REFILLS** TAKE 1 TABLET (175 MCG) BY MOUTH DAILY   Quantity:  90 tablet   Refills:  0       order for DME   Used for:  Irritation of ulnar nerve        Equipment being ordered: Heel/elbow protectors   Quantity:  1 each   Refills:  0       Vitamin C 500 MG Caps        Take  by mouth.   Refills:  0       vitamin D 1000 units capsule        Dose:  1 capsule   Take 1 capsule by mouth daily.    Refills:  0       zolpidem 5 MG tablet   Commonly known as:  AMBIEN   Used for:  Primary insomnia        Dose:  5 mg   Take 1 tablet (5 mg) by mouth nightly as needed for sleep   Quantity:  30 tablet   Refills:  5                Protect others around you: Learn how to safely use, store and throw away your medicines at www.disposemymeds.org.             Medication List: This is a list of all your medications and when to take them. Check marks below indicate your daily home schedule. Keep this list as a reference.      Medications           Morning Afternoon Evening Bedtime As Needed    CALCI-CHEW PO   Take  by mouth.                                FLINTSTONES COMPLETE PO   Take  by mouth.                                FLUoxetine 10 MG capsule   Commonly known as:  PROzac   Take 1 capsule (10 mg) by mouth daily                                levothyroxine 175 MCG tablet   Commonly known as:  SYNTHROID/LEVOTHROID   **MAKE APPT, NO MORE REFILLS** TAKE 1 TABLET (175 MCG) BY MOUTH DAILY                                order for DME   Equipment being ordered: Heel/elbow protectors                                Vitamin C 500 MG Caps   Take  by mouth.                                vitamin D 1000 units capsule   Take 1 capsule by mouth daily.                                zolpidem 5 MG tablet   Commonly known as:  AMBIEN   Take 1 tablet (5 mg) by mouth nightly as needed for sleep

## 2018-10-23 ENCOUNTER — TELEPHONE (OUTPATIENT)
Dept: INTERNAL MEDICINE | Facility: CLINIC | Age: 48
End: 2018-10-23

## 2018-10-23 NOTE — TELEPHONE ENCOUNTER
Carlie Brumfield is a 48 year old female who calls with back pain.    NURSING ASSESSMENT:  Description:  Back pain - right side, just above the buttock  Onset/duration:  Intermittently for a few months  Precip. factors:  None that she is aware of  Associated symptoms:  Occasional numbness/tingling down right leg  Improves/worsens symptoms:  Has tried heat, cold and ibuprofen, but none of these seem to help   Pain scale (0-10)   3/10 right now, was worse this morning  LMP/preg/breast feeding:  n/a    Allergies:   Allergies   Allergen Reactions     No Known Allergies        NURSING PLAN: Nursing advice to patient recommended appointment    RECOMMENDED DISPOSITION:  See within 2 weeks - appointment scheduled 10/29/18 with Dr. Pierre  Will comply with recommendation: Yes  If further questions/concerns or if symptoms do not improve, worsen or new symptoms develop, call your PCP or [facility] as soon as possible.      Guideline used:  Telephone Triage Protocols for Nurses, Fifth Edition, Tiffanie Bryant RN

## 2018-10-23 NOTE — TELEPHONE ENCOUNTER
Pt states she has off & on back pain & would like some advice. Please call [phone] ok to lm

## 2018-10-29 ENCOUNTER — RADIANT APPOINTMENT (OUTPATIENT)
Dept: GENERAL RADIOLOGY | Facility: CLINIC | Age: 48
End: 2018-10-29
Attending: INTERNAL MEDICINE
Payer: COMMERCIAL

## 2018-10-29 ENCOUNTER — OFFICE VISIT (OUTPATIENT)
Dept: INTERNAL MEDICINE | Facility: CLINIC | Age: 48
End: 2018-10-29
Payer: COMMERCIAL

## 2018-10-29 VITALS
TEMPERATURE: 97.8 F | OXYGEN SATURATION: 99 % | RESPIRATION RATE: 16 BRPM | DIASTOLIC BLOOD PRESSURE: 64 MMHG | HEIGHT: 66 IN | SYSTOLIC BLOOD PRESSURE: 100 MMHG | HEART RATE: 92 BPM | WEIGHT: 182 LBS | BODY MASS INDEX: 29.25 KG/M2

## 2018-10-29 DIAGNOSIS — D50.8 OTHER IRON DEFICIENCY ANEMIA: ICD-10-CM

## 2018-10-29 DIAGNOSIS — E53.8 VITAMIN B12 DEFICIENCY (NON ANEMIC): ICD-10-CM

## 2018-10-29 DIAGNOSIS — I87.2 VENOUS (PERIPHERAL) INSUFFICIENCY: ICD-10-CM

## 2018-10-29 DIAGNOSIS — G89.29 CHRONIC RIGHT-SIDED LOW BACK PAIN WITH RIGHT-SIDED SCIATICA: ICD-10-CM

## 2018-10-29 DIAGNOSIS — R20.0 NUMBNESS AND TINGLING: Primary | ICD-10-CM

## 2018-10-29 DIAGNOSIS — F32.0 MILD MAJOR DEPRESSION (H): ICD-10-CM

## 2018-10-29 DIAGNOSIS — Z23 NEED FOR PROPHYLACTIC VACCINATION AND INOCULATION AGAINST INFLUENZA: ICD-10-CM

## 2018-10-29 DIAGNOSIS — R20.2 NUMBNESS AND TINGLING: Primary | ICD-10-CM

## 2018-10-29 DIAGNOSIS — M54.41 CHRONIC RIGHT-SIDED LOW BACK PAIN WITH RIGHT-SIDED SCIATICA: ICD-10-CM

## 2018-10-29 DIAGNOSIS — Z12.31 VISIT FOR SCREENING MAMMOGRAM: ICD-10-CM

## 2018-10-29 PROCEDURE — 83540 ASSAY OF IRON: CPT | Performed by: INTERNAL MEDICINE

## 2018-10-29 PROCEDURE — 99214 OFFICE O/P EST MOD 30 MIN: CPT | Mod: 25 | Performed by: INTERNAL MEDICINE

## 2018-10-29 PROCEDURE — 83550 IRON BINDING TEST: CPT | Performed by: INTERNAL MEDICINE

## 2018-10-29 PROCEDURE — 82728 ASSAY OF FERRITIN: CPT | Performed by: INTERNAL MEDICINE

## 2018-10-29 PROCEDURE — 72100 X-RAY EXAM L-S SPINE 2/3 VWS: CPT

## 2018-10-29 PROCEDURE — 90682 RIV4 VACC RECOMBINANT DNA IM: CPT | Performed by: INTERNAL MEDICINE

## 2018-10-29 PROCEDURE — 36415 COLL VENOUS BLD VENIPUNCTURE: CPT | Performed by: INTERNAL MEDICINE

## 2018-10-29 PROCEDURE — 83735 ASSAY OF MAGNESIUM: CPT | Performed by: INTERNAL MEDICINE

## 2018-10-29 PROCEDURE — 90471 IMMUNIZATION ADMIN: CPT | Performed by: INTERNAL MEDICINE

## 2018-10-29 PROCEDURE — 82607 VITAMIN B-12: CPT | Performed by: INTERNAL MEDICINE

## 2018-10-29 ASSESSMENT — PATIENT HEALTH QUESTIONNAIRE - PHQ9: SUM OF ALL RESPONSES TO PHQ QUESTIONS 1-9: 11

## 2018-10-29 NOTE — MR AVS SNAPSHOT
After Visit Summary   10/29/2018    Carlie Brumfield    MRN: [id number]           Patient Information     Date Of Birth          1970        Visit Information        Provider Department      10/29/2018 4:00 PM Junior Pierre MD [facility]        Today's Diagnoses     Mild major depression (H)    -  1    Visit for screening mammogram        Need for prophylactic vaccination and inoculation against influenza        Venous (peripheral) insufficiency        Numbness and tingling        Chronic right-sided low back pain with right-sided sciatica        Other iron deficiency anemia           Follow-ups after your visit        Additional Services     NEUROLOGY ADULT REFERRAL       Your provider has referred you for the following:   Consult at [facility]: [facility] [phone]   [email or web address]    Please be aware that coverage of these services is subject to the terms and limitations of your health insurance plan.  Call member services at your health plan with any benefit or coverage questions.      Please bring the following with you to your appointment:    (1) Any X-Rays, CTs or MRIs which have been performed.  Contact the facility where they were done to arrange for  prior to your scheduled appointment.    (2) List of current medications  (3) This referral request   (4) Any documents/labs given to you for this referral                  Future tests that were ordered for you today     Open Future Orders        Priority Expected Expires Ordered    TSH with free T4 reflex Routine  10/29/2019 10/29/2018    Hemoglobin A1c Routine  10/29/2019 10/29/2018    XR Lumbar Spine 2/3 Views Routine 10/29/2018 10/29/2019 10/29/2018    *MA Screening Digital Bilateral Routine  10/29/2019 10/29/2018            Who to contact     If you have questions or need follow up information about today's clinic visit or your schedule please contact  "[facility] directly at [phone].  Normal or non-critical lab and imaging results will be communicated to you by MyChart, letter or phone within 4 business days after the clinic has received the results. If you do not hear from us within 7 days, please contact the clinic through [employer] or phone. If you have a critical or abnormal lab result, we will notify you by phone as soon as possible.  Submit refill requests through [employer] or call your pharmacy and they will forward the refill request to us. Please allow 3 business days for your refill to be completed.          Additional Information About Your Visit        [employer] Information     [employer] gives you secure access to your electronic health record. If you see a primary care provider, you can also send messages to your care team and make appointments. If you have questions, please call your primary care clinic.  If you do not have a primary care provider, please call [phone] and they will assist you.        Care EveryWhere ID     This is your Care EveryWhere ID. This could be used by other organizations to access your [employer] medical records  [id number]        Your Vitals Were     Pulse Temperature Respirations Height Last Period Pulse Oximetry    92 97.8  F (36.6  C) (Oral) 16 5' 6\" (1.676 m) 06/02/2011 99%    Breastfeeding? BMI (Body Mass Index)                No 29.38 kg/m2           Blood Pressure from Last 3 Encounters:   10/29/18 100/64   07/10/18 103/65   06/12/18 108/68    Weight from Last 3 Encounters:   10/29/18 182 lb (82.6 kg)   06/12/18 184 lb 1.6 oz (83.5 kg)   11/22/16 155 lb (70.3 kg)              We Performed the Following     DEPRESSION ACTION PLAN (DAP)     Ferritin     FLU VACCINE, (RIV4) RECOMBINANT HA  , IM (FluBlok, egg free) [42133]- >18 YRS (FMG recommended  50-64 YRS)     Iron and iron binding capacity     Magnesium     NEUROLOGY ADULT REFERRAL     Vaccine Administration, Initial [26175]     Vitamin B12        "   Today's Medication Changes          These changes are accurate as of 10/29/18  4:37 PM.  If you have any questions, ask your nurse or doctor.               Stop taking these medicines if you haven't already. Please contact your care team if you have questions.     FLUoxetine 10 MG capsule   Commonly known as:  PROzac   Stopped by:  Junior Pierre MD                    Primary Care Provider Office Phone # Fax #    Eulalia Cliff Broussard [phone]       [address] [facility] [place] [address]        Equal Access to Services     [facility]: Hadii aad ku hadasho Soomaali, waaxda luqadaha, qaybta kaalmada adeegyada, waxay raina haynahidn nohelia paz . So [facility] [phone].    ATENCIÓN: Si habla español, tiene a guevara disposición servicios gratuitos de asistencia lingüística. [facility] [phone].    We comply with applicable federal civil rights laws and [place] laws. We do not discriminate on the basis of race, color, national origin, age, disability, sex, sexual orientation, or gender identity.            Thank you!     Thank you for choosing [facility]  for your care. Our goal is always to provide you with excellent care. Hearing back from our patients is one way we can continue to improve our services. Please take a few minutes to complete the written survey that you may receive in the mail after your visit with us. Thank you!             Your Updated Medication List - Protect others around you: Learn how to safely use, store and throw away your medicines at www.disposemymeds.org.          This list is accurate as of 10/29/18  4:37 PM.  Always use your most recent med list.                   Brand Name Dispense Instructions for use Diagnosis    CALCI-CHEW PO      Take  by mouth.        FLINTSTONES COMPLETE PO      Take  by mouth.        levothyroxine 175 MCG tablet    SYNTHROID/LEVOTHROID    90 tablet    **MAKE APPT, NO MORE REFILLS** TAKE 1 TABLET (175 MCG) BY MOUTH  DAILY    Graves disease       order for DME     1 each    Equipment being ordered: Heel/elbow protectors    Irritation of ulnar nerve       Vitamin C 500 MG Caps      Take  by mouth.        vitamin D 1000 units capsule      Take 1 capsule by mouth daily.        zolpidem 5 MG tablet    AMBIEN    30 tablet    Take 1 tablet (5 mg) by mouth nightly as needed for sleep    Primary insomnia

## 2018-10-29 NOTE — NURSING NOTE
"/64 (BP Location: Right arm, Patient Position: Sitting, Cuff Size: Adult Large)  Pulse 92  Temp 97.8  F (36.6  C) (Oral)  Resp 16  Ht 5' 6\" (1.676 m)  Wt 182 lb (82.6 kg)  LMP 06/02/2011  SpO2 99%  Breastfeeding? No  BMI 29.38 kg/m2  Judith Ledezma CMA    "

## 2018-10-29 NOTE — LETTER
My Depression Action Plan  Name: Carlie Brumfield   Date of Birth 1970  Date: 10/29/2018    My doctor: Eulalia Broussard   My clinic: [place] [address]  [facility] [id number] [phone]          GREEN    ZONE   Good Control    What it looks like:     Things are going generally well. You have normal up s and down s. You may even feel depressed from time to time, but bad moods usually last less than a day.   What you need to do:  1. Continue to care for yourself (see self care plan)  2. Check your depression survival kit and update it as needed  3. Follow your physician s recommendations including any medication.  4. Do not stop taking medication unless you consult with your physician first.           YELLOW         ZONE Getting Worse    What it looks like:     Depression is starting to interfere with your life.     It may be hard to get out of bed; you may be starting to isolate yourself from others.    Symptoms of depression are starting to last most all day and this has happened for several days.     You may have suicidal thoughts but they are not constant.   What you need to do:     1. Call your care team, your response to treatment will improve if you keep your care team informed of your progress. Yellow periods are signs an adjustment may need to be made.     2. Continue your self-care, even if you have to fake it!    3. Talk to someone in your support network    4. Open up your depression survival kit           RED    ZONE Medical Alert - Get Help    What it looks like:     Depression is seriously interfering with your life.     You may experience these or other symptoms: You can t get out of bed most days, can t work or engage in other necessary activities, you have trouble taking care of basic hygiene, or basic responsibilities, thoughts of suicide or death that will not go away, self-injurious behavior.     What you need to do:  1. Call your care team  and request a same-day appointment. If they are not available (weekends or after hours) call your local crisis line, emergency room or 911.            Depression Self Care Plan / Survival Kit    Self-Care for Depression  Here s the deal. Your body and mind are really not as separate as most people think.  What you do and think affects how you feel and how you feel influences what you do and think. This means if you do things that people who feel good do, it will help you feel better.  Sometimes this is all it takes.  There is also a place for medication and therapy depending on how severe your depression is, so be sure to consult with your medical provider and/ or Behavioral Health Consultant if your symptoms are worsening or not improving.     In order to better manage my stress, I will:    Exercise  Get some form of exercise, every day. This will help reduce pain and release endorphins, the  feel good  chemicals in your brain. This is almost as good as taking antidepressants!  This is not the same as joining a gym and then never going! (they count on that by the way ) It can be as simple as just going for a walk or doing some gardening, anything that will get you moving.      Hygiene   Maintain good hygiene (Get out of bed in the morning, Make your bed, Brush your teeth, Take a shower, and Get dressed like you were going to work, even if you are unemployed).  If your clothes don't fit try to get ones that do.    Diet  I will strive to eat foods that are good for me, drink plenty of water, and avoid excessive sugar, caffeine, alcohol, and other mood-altering substances.  Some foods that are helpful in depression are: complex carbohydrates, B vitamins, flaxseed, fish or fish oil, fresh fruits and vegetables.    Psychotherapy  I agree to participate in Individual Therapy (if recommended).    Medication  If prescribed medications, I agree to take them.  Missing doses can result in serious side effects.  I understand  that drinking alcohol, or other illicit drug use, may cause potential side effects.  I will not stop my medication abruptly without first discussing it with my provider.    Staying Connected With Others  I will stay in touch with my friends, family members, and my primary care provider/team.    Use your imagination  Be creative.  We all have a creative side; it doesn t matter if it s oil painting, sand castles, or mud pies! This will also kick up the endorphins.    Witness Beauty  (AKA stop and smell the roses) Take a look outside, even in mid-winter. Notice colors, textures. Watch the squirrels and birds.     Service to others  Be of service to others.  There is always someone else in need.  By helping others we can  get out of ourselves  and remember the really important things.  This also provides opportunities for practicing all the other parts of the program.    Humor  Laugh and be silly!  Adjust your TV habits for less news and crime-drama and more comedy.    Control your stress  Try breathing deep, massage therapy, biofeedback, and meditation. Find time to relax each day.     My support system    Clinic Contact:  Phone number:    Contact 1:  Phone number:    Contact 2:  Phone number:    Church/:  Phone number:    Therapist:  Phone number:    Local crisis center:    Phone number:    Other community support:  Phone number:

## 2018-10-29 NOTE — PROGRESS NOTES
ASSESSMENT/PLAN:       1. Numbness and tingling  Patient presents with numbness tingling in bilateral toes also with low back pain (see below)  She had absent vibratory senses so I checked VitB12 which was low. Will replete and recheck in 3 months. Iron low as well(microcytic anemia), also gave her daily supplements    - NEUROLOGY ADULT REFERRAL  - Vitamin B12  - TSH with free T4 reflex; Future  - Hemoglobin A1c; Future  - Magnesium    2. Vitamin B12 deficiency (non anemic)  See above  - Cyanocobalamin (B-12) 1000 MCG TBCR; Take 1,000 mcg by mouth daily  Dispense: 100 tablet; Refill: 1  - Vitamin B12; Future    3. Other iron deficiency anemia  See above  - Ferritin  - Iron and iron binding capacity  - ferrous sulfate (IRON) 325 (65 Fe) MG tablet; Take 1 tablet (325 mg) by mouth daily (with breakfast)  Dispense: 30 tablet; Refill: 2  - Ferritin; Future  - Iron and iron binding capacity; Future  - CBC with platelets differential; Future    4. Chronic right-sided low back pain with right-sided sciatica  Xray Lumbar spine looks fine  - XR Lumbar Spine 2/3 Views; Future    5. Mild major depression (H)  Discussed with patient, this is worse with the pain.    - DEPRESSION ACTION PLAN (DAP)    6. Venous (peripheral) insufficiency  I think edema of leg is related to above, varicose veins noted. She will consider compression stockings    7. Visit for screening mammogram  screen  - *MA Screening Digital Bilateral; Future    8. Need for prophylactic vaccination and inoculation against influenza  Flu given  - FLU VACCINE, (RIV4) RECOMBINANT HA  , IM (FluBlok, egg free) [99059]- >18 YRS (FMG recommended  50-64 YRS)  - Vaccine Administration, Initial [49278]        Junior Pierre MD  [facility]      Injectable Influenza Immunization Documentation    1.  Is the person to be vaccinated sick today?   No    2. Does the person to be vaccinated have an allergy to a component   of the vaccine?   No  Egg Allergy  Algorithm Link    3. Has the person to be vaccinated ever had a serious reaction   to influenza vaccine in the past?   No    4. Has the person to be vaccinated ever had Guillain-Barré syndrome?   No    Form completed by Judith Ledezma CMA            SUBJECTIVE:   Carlie Brumfield is a 48 year old female who presents to clinic today for the following health issues:      Low back pain, some radiation down upper leg  She also notes bilateral lower extremity edema.   She also notes foot numbness/tingling.    MRI Lumbar showed bulging disc previously. 2013    Back Pain     Duration: 3 months        Specific cause: walking    Description:   Location of pain: low back right  Character of pain: sharp  Pain radiation:radiates into the right buttocks  New numbness or weakness in legs, not attributed to pain:  no     Intensity: currently 3-4/10 At its worst 7/10    History:   Pain interferes with job: No,   History of back problems: previous osteoarthritis of lumbar spine and disc bulge  Any previous MRI or X-rays: Yes- at [place].  Date 2013  Sees a specialist for back pain:  No  Therapies tried without relief:     Alleviating factors:   Improved by:       Precipitating factors:  Worsened by: prolonged standing    Accompanying Signs & Symptoms:  Risk of Fracture:  None  Risk of Cauda Equina:  None  Risk of Infection:  None  Risk of Cancer:  None  Risk of Ankylosing Spondylitis:  Onset at age <35, male, AND morning back stiffness. no           Problem list and histories reviewed & adjusted, as indicated.  Additional history: as documented    Patient Active Problem List   Diagnosis     JOINT PAIN-LOWER LEG(aka KNEE)     Insomnia     Graves disease     Hypothyroidism     Mixed hyperlipemia     HYPERLIPIDEMIA LDL GOAL <130     Anemia     Status post gastric bypass for obesity     Mild major depression (H)     Right elbow pain     Elbow pain     Low back pain     Past Surgical History:   Procedure Laterality Date      "APPENDECTOMY       C NONSPECIFIC PROCEDURE      2 c-sections     C NONSPECIFIC PROCEDURE      Appendectomy -- abstracted 7/15/02     C NONSPECIFIC PROCEDURE      Left thumb tendon repair after injury     COLONOSCOPY N/A 7/10/2018    Procedure: COLONOSCOPY;  Colonoscopy ([facility])  ;  Surgeon: Lino Lopez MD;  Location:  GI     ESOPHAGOSCOPY, GASTROSCOPY, DUODENOSCOPY (EGD), COMBINED       ESOPHAGOSCOPY, GASTROSCOPY, DUODENOSCOPY (EGD), COMBINED N/A 12/18/2015    Procedure: COMBINED ESOPHAGOSCOPY, GASTROSCOPY, DUODENOSCOPY (EGD), BIOPSY SINGLE OR MULTIPLE;  Surgeon: Lino Lopez MD;  Location:  GI     GI SURGERY      bariatric surgery     HYSTERECTOMY, PAP NO LONGER INDICATED       ORTHOPEDIC SURGERY         Social History   Substance Use Topics     Smoking status: Never Smoker     Smokeless tobacco: Never Used     Alcohol use 0.0 oz/week     0 Standard drinks or equivalent per week      Comment: rare     Family History   Problem Relation Age of Onset     Osteoporosis Mother      Unknown/Adopted Father            Reviewed and updated as needed this visit by clinical staff  Tobacco  Allergies  Med Hx  Surg Hx  Fam Hx  Soc Hx      Reviewed and updated as needed this visit by Provider         ROS:  Constitutional, HEENT, cardiovascular, pulmonary, gi and gu systems are negative, except as otherwise noted.    OBJECTIVE:     /64 (BP Location: Right arm, Patient Position: Sitting, Cuff Size: Adult Large)  Pulse 92  Temp 97.8  F (36.6  C) (Oral)  Resp 16  Ht 5' 6\" (1.676 m)  Wt 182 lb (82.6 kg)  LMP 06/02/2011  SpO2 99%  Breastfeeding? No  BMI 29.38 kg/m2  Body mass index is 29.38 kg/(m^2).  GENERAL: healthy, alert and no distress  NECK: no adenopathy, no asymmetry, masses, or scars and thyroid normal to palpation  RESP: lungs clear to auscultation - no rales, rhonchi or wheezes  CV: regular rate and rhythm, normal S1 S2, no S3 or S4  ABDOMEN: soft, nontender, no hepatosplenomegaly, no masses " and bowel sounds normal  MS: no gross musculoskeletal defects noted, no edema  Neuro: Absent vibratory sense bilaterally toes. Intact position sense.  Intact sensation distal BLE  Ext:  She has some evidence of venous insufficiency, varicose veins noted throughout upper leg    Diagnostic Test Results:  none

## 2018-10-30 LAB
FERRITIN SERPL-MCNC: 5 NG/ML (ref 8–252)
IRON SATN MFR SERPL: 6 % (ref 15–46)
IRON SERPL-MCNC: 23 UG/DL (ref 35–180)
MAGNESIUM SERPL-MCNC: 2.2 MG/DL (ref 1.6–2.3)
TIBC SERPL-MCNC: 379 UG/DL (ref 240–430)
VIT B12 SERPL-MCNC: 170 PG/ML (ref 193–986)

## 2018-10-31 RX ORDER — FERROUS SULFATE 325(65) MG
325 TABLET ORAL
Qty: 30 TABLET | Refills: 2 | Status: SHIPPED | OUTPATIENT
Start: 2018-10-31 | End: 2021-03-30

## 2019-01-03 DIAGNOSIS — E05.00 GRAVES DISEASE: ICD-10-CM

## 2019-01-03 NOTE — TELEPHONE ENCOUNTER
"Requested Prescriptions   Pending Prescriptions Disp Refills     levothyroxine (SYNTHROID/LEVOTHROID) 175 MCG tablet [Pharmacy Med Name: LEVOTHYROXINE 175 MCG TABLET] 90 tablet 0    Last Written Prescription Date:  06/12/2018  Last Fill Quantity: 90,  # refills: 0   Last office visit: 10/29/2018 with prescribing provider:     Future Office Visit:   Sig: **MAKE APPT, NO MORE REFILLS** TAKE 1 TABLET (175 MCG) BY MOUTH DAILY    Thyroid Protocol Failed - 1/3/2019  2:39 PM       Failed - Normal TSH on file in past 12 months    Recent Labs   Lab Test 06/12/18  1205   TSH 75.52*             Passed - Patient is 12 years or older       Passed - Recent (12 mo) or future (30 days) visit within the authorizing provider's specialty    Patient had office visit in the last 12 months or has a visit in the next 30 days with authorizing provider or within the authorizing provider's specialty.  See \"Patient Info\" tab in inbasket, or \"Choose Columns\" in Meds & Orders section of the refill encounter.             Passed - No active pregnancy on record    If patient is pregnant or has had a positive pregnancy test, please check TSH.         Passed - No positive pregnancy test in past 12 months    If patient is pregnant or has had a positive pregnancy test, please check TSH.          "

## 2019-01-07 RX ORDER — LEVOTHYROXINE SODIUM 175 UG/1
TABLET ORAL
Qty: 90 TABLET | Refills: 0 | Status: SHIPPED | OUTPATIENT
Start: 2019-01-07 | End: 2019-05-15

## 2019-05-15 DIAGNOSIS — E05.00 GRAVES DISEASE: ICD-10-CM

## 2019-05-16 RX ORDER — LEVOTHYROXINE SODIUM 175 UG/1
TABLET ORAL
Qty: 30 TABLET | Refills: 0 | Status: SHIPPED | OUTPATIENT
Start: 2019-05-16 | End: 2019-05-31

## 2019-05-16 NOTE — TELEPHONE ENCOUNTER
"Routing refill request to provider for review/approval because:  Labs out of range:  TSH    Per 6/12/18 lab result note:  \"The thyroid lab is elevated, but the free T4 is normal.   Recommend repeating thyroid labs in 3 months after taking medication daily and reviewing with pharmacy how to take it\"    Patient did not complete follow up labs. Left a voicemail asking patient to call the clinic back.       "

## 2019-05-16 NOTE — TELEPHONE ENCOUNTER
"Requested Prescriptions   Pending Prescriptions Disp Refills     levothyroxine (SYNTHROID/LEVOTHROID) 175 MCG tablet [Pharmacy Med Name: LEVOTHYROXINE 175 MCG TABLET] 90 tablet 0     Sig: **MAKE APPT, NO MORE REFILLS** TAKE 1 TABLET (175 MCG) BY MOUTH DAILY   Last Written Prescription Date:  01/07/2019  Last Fill Quantity: 90,  # refills: 0   Last office visit: 10/29/2018 with prescribing provider:     Future Office Visit:      Thyroid Protocol Failed - 5/15/2019  7:06 PM        Failed - Normal TSH on file in past 12 months     Recent Labs   Lab Test 06/12/18  1205   TSH 75.52*              Passed - Patient is 12 years or older        Passed - Recent (12 mo) or future (30 days) visit within the authorizing provider's specialty     Patient had office visit in the last 12 months or has a visit in the next 30 days with authorizing provider or within the authorizing provider's specialty.  See \"Patient Info\" tab in inbasket, or \"Choose Columns\" in Meds & Orders section of the refill encounter.              Passed - Medication is active on med list        Passed - No active pregnancy on record     If patient is pregnant or has had a positive pregnancy test, please check TSH.          Passed - No positive pregnancy test in past 12 months     If patient is pregnant or has had a positive pregnancy test, please check TSH.          "

## 2019-05-20 DIAGNOSIS — R20.2 NUMBNESS AND TINGLING: ICD-10-CM

## 2019-05-20 DIAGNOSIS — E53.8 VITAMIN B12 DEFICIENCY (NON ANEMIC): ICD-10-CM

## 2019-05-20 DIAGNOSIS — D50.8 OTHER IRON DEFICIENCY ANEMIA: ICD-10-CM

## 2019-05-20 DIAGNOSIS — R20.0 NUMBNESS AND TINGLING: ICD-10-CM

## 2019-05-20 LAB
BASOPHILS # BLD AUTO: 0 10E9/L (ref 0–0.2)
BASOPHILS NFR BLD AUTO: 0.5 %
DIFFERENTIAL METHOD BLD: ABNORMAL
EOSINOPHIL # BLD AUTO: 0.2 10E9/L (ref 0–0.7)
EOSINOPHIL NFR BLD AUTO: 4.2 %
ERYTHROCYTE [DISTWIDTH] IN BLOOD BY AUTOMATED COUNT: 16.1 % (ref 10–15)
HBA1C MFR BLD: 5.2 % (ref 0–5.6)
HCT VFR BLD AUTO: 34.8 % (ref 35–47)
HGB BLD-MCNC: 10.7 G/DL (ref 11.7–15.7)
LYMPHOCYTES # BLD AUTO: 1.2 10E9/L (ref 0.8–5.3)
LYMPHOCYTES NFR BLD AUTO: 21.1 %
MCH RBC QN AUTO: 25.1 PG (ref 26.5–33)
MCHC RBC AUTO-ENTMCNC: 30.7 G/DL (ref 31.5–36.5)
MCV RBC AUTO: 82 FL (ref 78–100)
MONOCYTES # BLD AUTO: 0.5 10E9/L (ref 0–1.3)
MONOCYTES NFR BLD AUTO: 8.8 %
NEUTROPHILS # BLD AUTO: 3.8 10E9/L (ref 1.6–8.3)
NEUTROPHILS NFR BLD AUTO: 65.4 %
PLATELET # BLD AUTO: 273 10E9/L (ref 150–450)
RBC # BLD AUTO: 4.27 10E12/L (ref 3.8–5.2)
VIT B12 SERPL-MCNC: 157 PG/ML (ref 193–986)
WBC # BLD AUTO: 5.8 10E9/L (ref 4–11)

## 2019-05-20 PROCEDURE — 84443 ASSAY THYROID STIM HORMONE: CPT | Performed by: INTERNAL MEDICINE

## 2019-05-20 PROCEDURE — 82728 ASSAY OF FERRITIN: CPT | Performed by: INTERNAL MEDICINE

## 2019-05-20 PROCEDURE — 83036 HEMOGLOBIN GLYCOSYLATED A1C: CPT | Performed by: INTERNAL MEDICINE

## 2019-05-20 PROCEDURE — 82607 VITAMIN B-12: CPT | Performed by: INTERNAL MEDICINE

## 2019-05-20 PROCEDURE — 83540 ASSAY OF IRON: CPT | Performed by: INTERNAL MEDICINE

## 2019-05-20 PROCEDURE — 84439 ASSAY OF FREE THYROXINE: CPT | Performed by: INTERNAL MEDICINE

## 2019-05-20 PROCEDURE — 36415 COLL VENOUS BLD VENIPUNCTURE: CPT | Performed by: INTERNAL MEDICINE

## 2019-05-20 PROCEDURE — 85025 COMPLETE CBC W/AUTO DIFF WBC: CPT | Performed by: INTERNAL MEDICINE

## 2019-05-20 PROCEDURE — 83550 IRON BINDING TEST: CPT | Performed by: INTERNAL MEDICINE

## 2019-05-21 LAB
FERRITIN SERPL-MCNC: 8 NG/ML (ref 8–252)
IRON SATN MFR SERPL: 6 % (ref 15–46)
IRON SERPL-MCNC: 27 UG/DL (ref 35–180)
T4 FREE SERPL-MCNC: 0.55 NG/DL (ref 0.76–1.46)
TIBC SERPL-MCNC: 419 UG/DL (ref 240–430)
TSH SERPL DL<=0.005 MIU/L-ACNC: 61.15 MU/L (ref 0.4–4)

## 2019-05-31 ENCOUNTER — OFFICE VISIT (OUTPATIENT)
Dept: INTERNAL MEDICINE | Facility: CLINIC | Age: 49
End: 2019-05-31
Payer: COMMERCIAL

## 2019-05-31 VITALS
BODY MASS INDEX: 29.73 KG/M2 | HEART RATE: 79 BPM | DIASTOLIC BLOOD PRESSURE: 74 MMHG | HEIGHT: 66 IN | OXYGEN SATURATION: 100 % | TEMPERATURE: 98.5 F | WEIGHT: 185 LBS | SYSTOLIC BLOOD PRESSURE: 102 MMHG | RESPIRATION RATE: 12 BRPM

## 2019-05-31 DIAGNOSIS — E05.00 GRAVES DISEASE: Primary | ICD-10-CM

## 2019-05-31 DIAGNOSIS — R79.89 LOW VITAMIN B12 LEVEL: ICD-10-CM

## 2019-05-31 DIAGNOSIS — E03.9 ACQUIRED HYPOTHYROIDISM: ICD-10-CM

## 2019-05-31 DIAGNOSIS — F32.0 MILD MAJOR DEPRESSION (H): ICD-10-CM

## 2019-05-31 DIAGNOSIS — D50.8 OTHER IRON DEFICIENCY ANEMIA: ICD-10-CM

## 2019-05-31 PROCEDURE — 99214 OFFICE O/P EST MOD 30 MIN: CPT | Performed by: INTERNAL MEDICINE

## 2019-05-31 RX ORDER — LEVOTHYROXINE SODIUM 175 UG/1
TABLET ORAL
Qty: 90 TABLET | Refills: 0 | Status: SHIPPED | OUTPATIENT
Start: 2019-05-31 | End: 2019-08-12

## 2019-05-31 RX ORDER — CYANOCOBALAMIN 1000 UG/ML
1000 INJECTION, SOLUTION INTRAMUSCULAR; SUBCUTANEOUS
Status: ACTIVE | OUTPATIENT
Start: 2019-05-31

## 2019-05-31 ASSESSMENT — MIFFLIN-ST. JEOR: SCORE: 1470.9

## 2019-05-31 ASSESSMENT — PATIENT HEALTH QUESTIONNAIRE - PHQ9: SUM OF ALL RESPONSES TO PHQ QUESTIONS 1-9: 8

## 2019-05-31 NOTE — PROGRESS NOTES
Subjective     Carlie Brumfield is a 49 year old female who presents to clinic today for the following health issues:    HPI   Depression Followup    How are you doing with your depression since your last visit? No change    Are you having other symptoms that might be associated with depression? No    Have you had a significant life event?  No     Are you feeling anxious or having panic attacks?   No    Do you have any concerns with your use of alcohol or other drugs? No    Social History     Tobacco Use     Smoking status: Never Smoker     Smokeless tobacco: Never Used   Substance Use Topics     Alcohol use: Yes     Alcohol/week: 0.0 oz     Comment: rare     Drug use: No     PHQ 6/12/2018 10/29/2018 5/31/2019   PHQ-9 Total Score 12 11 8   Q9: Thoughts of better off dead/self-harm past 2 weeks Not at all Not at all Not at all     TAJ-7 SCORE 12/17/2015 6/12/2018 6/12/2018   Total Score - - -   Total Score 5 7 7     Patient's PHQ-9 score is 8 today.     Suicide Assessment Five-step Evaluation and Treatment (SAFE-T)  Hypothyroidism Follow-up      Since last visit, patient describes the following symptoms: Weight stable, no hair loss, no skin changes, no constipation, no loose stools      Amount of exercise or physical activity: None    Problems taking medications regularly: No    Medication side effects: iron GI upset    Diet: regular (no restrictions)  Recent Labs   Lab Test 06/12/18  1205 03/21/16  0952 09/17/14  1040   CHOL 227* 204* 177   HDL 89 75 80   * 117* 85   TRIG 108 60 58   CHOLHDLRATIO  --   --  2.2     Patient reports that she is not taking the levothyroxine 175 mg daily as prescribed. Her last thyroid check was on 6/12/2018. Patient does not have a regularly exercise routine.     Upper respiratory symptoms   Patient reports that she had flu like symptoms, which started last week and went away. However, these symptoms returned yesterday (5/30/2019). She mentions that she had rhinorrhea, congestion,  "and ear pain. Patient took Nyquil to alleviate her symptoms. She notes that Claritin did not work. Denies fevers or chills.     Insomnia   She reports that she does not take ambien 5 mg every day.  She works night shift and sometimes does not have an 8 hours stretch to sleep.    S/p gastric bypass.  -Discontinued taking her iron medication because of GI upset  She has been taking oral B12      -Patient's last colonoscopy screening was on 7/10/2018    Reviewed and updated as needed this visit by Provider  Meds  Problems         Review of Systems   ROS COMP: CONSTITUTIONAL: NEGATIVE for fever, chills, change in weight  RESP: NEGATIVE for significant cough or SOB  CV: NEGATIVE for chest pain, palpitations or peripheral edema  PSYCHIATRIC: Positive for depression  This document serves as a record of the services and decisions personally performed and made by Eulalia Broussard MD. It was created on his behalf by Norah Traore, a trained medical scribe. The creation of this document is based on the provider's statements to the medical scribe.  Norah Traore May 31, 2019 12:44 PM       Objective    /74   Pulse 79   Temp 98.5  F (36.9  C) (Oral)   Resp 12   Ht 1.676 m (5' 6\")   Wt 83.9 kg (185 lb)   LMP 06/02/2011   SpO2 100%   Breastfeeding? No   BMI 29.86 kg/m    Temp: 98.5  F (36.9  C) Temp src: Oral BP: 102/74 Pulse: 79   Resp: 12 SpO2: 100 %         Physical Exam   GENERAL: healthy, alert and no distress  HEENT: ear canals clear bilaterally  NECK: no adenopathy, no asymmetry, masses, or scars and thyroid normal to palpation  RESP: lungs clear to auscultation - no rales, rhonchi or wheezes  CV: regular rate and rhythm, normal S1 S2, no S3 or S4, no murmur, click or rub    Diagnostic Test Results:  Labs reviewed in Epic  No results found for this or any previous visit (from the past 24 hour(s)).        Assessment & Plan     (E05.00) Graves disease  (primary encounter diagnosis)  Comment: Stable.  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  Plan: monitor for now, as some of PHQ9 is from fatigue (iron and B12 deficiency, plus thyroid disease)    (D50.8) Other iron deficiency anemia  Comment:   Plan: ONC/HEME ADULT REFERRAL            (E03.9) Acquired hypothyroidism  Comment: Assess.  Plan: TSH with free T4 reflex            (E53.8) Low vitamin B12 level  Comment: assess  Plan: Vitamin B12               BMI:   Estimated body mass index is 29.86 kg/m  as calculated from the following:    Height as of this encounter: 1.676 m (5' 6\").    Weight as of this encounter: 83.9 kg (185 lb).           FUTURE APPOINTMENTS:       - Return in about 3 months (around 8/31/2019) for lab work- B12 and TSH.     The information in this document, created by the medical scribe for me, accurately reflects the services I personally performed and the decisions made by me. I have reviewed and approved this document for accuracy.   May 31, 2019 12:55 PM     Eulalia Broussard MD  [facility]    "

## 2019-05-31 NOTE — NURSING NOTE
"/74   Pulse 79   Temp 98.5  F (36.9  C) (Oral)   Resp 12   Ht 1.676 m (5' 6\")   Wt 83.9 kg (185 lb)   LMP 06/02/2011   SpO2 100%   Breastfeeding? No   BMI 29.86 kg/m      "

## 2019-06-17 ENCOUNTER — HOSPITAL ENCOUNTER (OUTPATIENT)
Dept: MAMMOGRAPHY | Facility: CLINIC | Age: 49
Discharge: HOME | End: 2019-06-17
Attending: INTERNAL MEDICINE | Admitting: INTERNAL MEDICINE
Payer: COMMERCIAL

## 2019-06-17 DIAGNOSIS — Z12.31 VISIT FOR SCREENING MAMMOGRAM: ICD-10-CM

## 2019-06-17 PROCEDURE — 77063 BREAST TOMOSYNTHESIS BI: CPT

## 2019-08-12 DIAGNOSIS — E05.00 GRAVES DISEASE: ICD-10-CM

## 2019-08-12 NOTE — TELEPHONE ENCOUNTER
"Requested Prescriptions   Pending Prescriptions Disp Refills     levothyroxine (SYNTHROID/LEVOTHROID) 175 MCG tablet [Pharmacy Med Name: LEVOTHYROXINE 175 MCG TABLET]  Last Written Prescription Date:  5/31/2019  Last Fill Quantity: 90,  # refills: 0   Last office visit: 5/31/2019 with prescribing provider:     Future Office Visit:   90 tablet 0     Sig: TAKE 1 TABLET (175 MCG) BY MOUTH DAILY       Thyroid Protocol Failed - 8/12/2019  9:58 AM        Failed - Normal TSH on file in past 12 months     Recent Labs   Lab Test 05/20/19  1247   TSH 61.15*              Passed - Patient is 12 years or older        Passed - Recent (12 mo) or future (30 days) visit within the authorizing provider's specialty     Patient had office visit in the last 12 months or has a visit in the next 30 days with authorizing provider or within the authorizing provider's specialty.  See \"Patient Info\" tab in inbasket, or \"Choose Columns\" in Meds & Orders section of the refill encounter.              Passed - Medication is active on med list        Passed - No active pregnancy on record     If patient is pregnant or has had a positive pregnancy test, please check TSH.          Passed - No positive pregnancy test in past 12 months     If patient is pregnant or has had a positive pregnancy test, please check TSH.          "

## 2019-08-13 RX ORDER — LEVOTHYROXINE SODIUM 175 UG/1
TABLET ORAL
Qty: 90 TABLET | Refills: 0 | Status: SHIPPED | OUTPATIENT
Start: 2019-08-13 | End: 2019-11-06

## 2019-08-13 NOTE — TELEPHONE ENCOUNTER
Routing refill request to provider for review/approval because:  Labs out of range:    TSH   Date Value Ref Range Status   05/20/2019 61.15 (H) 0.40 - 4.00 mU/L Final

## 2020-07-01 ENCOUNTER — VIRTUAL VISIT (OUTPATIENT)
Dept: INTERNAL MEDICINE | Facility: CLINIC | Age: 50
End: 2020-07-01
Payer: COMMERCIAL

## 2020-07-01 ENCOUNTER — NURSE TRIAGE (OUTPATIENT)
Dept: INTERNAL MEDICINE | Facility: CLINIC | Age: 50
End: 2020-07-01

## 2020-07-01 DIAGNOSIS — D50.8 OTHER IRON DEFICIENCY ANEMIA: ICD-10-CM

## 2020-07-01 DIAGNOSIS — R79.89 LOW VITAMIN B12 LEVEL: Primary | ICD-10-CM

## 2020-07-01 DIAGNOSIS — E53.8 VITAMIN B12 DEFICIENCY (NON ANEMIC): ICD-10-CM

## 2020-07-01 DIAGNOSIS — R20.2 NUMBNESS AND TINGLING: ICD-10-CM

## 2020-07-01 DIAGNOSIS — E16.2 HYPOGLYCEMIA: ICD-10-CM

## 2020-07-01 DIAGNOSIS — R20.0 NUMBNESS AND TINGLING: ICD-10-CM

## 2020-07-01 DIAGNOSIS — R42 DIZZINESS: Primary | ICD-10-CM

## 2020-07-01 DIAGNOSIS — E05.00 GRAVES DISEASE: ICD-10-CM

## 2020-07-01 DIAGNOSIS — E61.1 IRON DEFICIENCY: ICD-10-CM

## 2020-07-01 DIAGNOSIS — E03.9 HYPOTHYROIDISM, UNSPECIFIED TYPE: ICD-10-CM

## 2020-07-01 PROCEDURE — 99214 OFFICE O/P EST MOD 30 MIN: CPT | Mod: 95 | Performed by: INTERNAL MEDICINE

## 2020-07-01 ASSESSMENT — ANXIETY QUESTIONNAIRES
6. BECOMING EASILY ANNOYED OR IRRITABLE: MORE THAN HALF THE DAYS
GAD7 TOTAL SCORE: 10
2. NOT BEING ABLE TO STOP OR CONTROL WORRYING: MORE THAN HALF THE DAYS
5. BEING SO RESTLESS THAT IT IS HARD TO SIT STILL: NOT AT ALL
3. WORRYING TOO MUCH ABOUT DIFFERENT THINGS: MORE THAN HALF THE DAYS
7. FEELING AFRAID AS IF SOMETHING AWFUL MIGHT HAPPEN: NOT AT ALL
1. FEELING NERVOUS, ANXIOUS, OR ON EDGE: MORE THAN HALF THE DAYS

## 2020-07-01 ASSESSMENT — PATIENT HEALTH QUESTIONNAIRE - PHQ9
5. POOR APPETITE OR OVEREATING: MORE THAN HALF THE DAYS
SUM OF ALL RESPONSES TO PHQ QUESTIONS 1-9: 10

## 2020-07-01 NOTE — TELEPHONE ENCOUNTER
Patient had a video appointment with primary care provider today.     Should we wait to refill medication until after her lab work comes back?  Please advise, thanks.

## 2020-07-01 NOTE — PROGRESS NOTES
"Carlie Brumfield is a 50 year old female who is being evaluated via a billable video visit.      The patient has been notified of following:     \"This video visit will be conducted via a call between you and your physician/provider. We have found that certain health care needs can be provided without the need for an in-person physical exam.  This service lets us provide the care you need with a video conversation.  If a prescription is necessary we can send it directly to your pharmacy.  If lab work is needed we can place an order for that and you can then stop by our lab to have the test done at a later time.    Video visits are billed at different rates depending on your insurance coverage.  Please reach out to your insurance provider with any questions.    If during the course of the call the physician/provider feels a video visit is not appropriate, you will not be charged for this service.\"  YesPatient has given verbal consent for Video visit? Yes  How would you like to obtain your AVS? Shira  Patient would like the video invitation sent by: [email or web address]  Will anyone else be joining your video visit? No      Subjective     Carlie Brumfield is a 50 year old female who presents today via video visit for the following health issues:    HPI   Refer to triage encounter from today. Due for anemia and thyroid labs. Unsure if related to either.    Hypothyroidism Follow-up      Since last visit, patient describes the following symptoms: weight loss of 10 lbs roughly and hair loss  She reports that her toes are numb/tingling- intermittently.  Some numbness/tinlging of her lower legs at times.    She is taking her thyroid medication.  Of note, she has a low level of B12 and has not been taking this.  Has used injections.   She also has low iron in the past.     Dizziness.  The patient reports that she becomes dizzy from sitting to standing.  She also reports that it can occur every day when she is just sitting.  " Lasts for a few seconds.  She is staying hydrated.  She is not eating regularly.    No chest pain or shortness of breath.  Does not feel like room is spinning.    She also has had some brain fog.      TAJ.  Score of 10.   No depression.    She wants to first see what the labs are first.  Not aware of family on medication.  She was on prozac, which she does not think helped much.      How many servings of fruits and vegetables do you eat daily?  2-3    On average, how many sweetened beverages do you drink each day (Examples: soda, juice, sweet tea, etc.  Do NOT count diet or artificially sweetened beverages)?   0    How many days per week do you exercise enough to make your heart beat faster? 3 or less    How many minutes a day do you exercise enough to make your heart beat faster? 9 or less    How many days per week do you miss taking your medication? 0        Video Start Time: 4:04pm        Patient Active Problem List   Diagnosis     JOINT PAIN-LOWER LEG(aka KNEE)     Insomnia     Graves disease     Hypothyroidism     Mixed hyperlipemia     HYPERLIPIDEMIA LDL GOAL <130     Anemia     Status post gastric bypass for obesity     Mild major depression (H)     Right elbow pain     Elbow pain     Low back pain     Past Surgical History:   Procedure Laterality Date     APPENDECTOMY       C NONSPECIFIC PROCEDURE      2 c-sections     C NONSPECIFIC PROCEDURE      Appendectomy -- abstracted 7/15/02     C NONSPECIFIC PROCEDURE      Left thumb tendon repair after injury     COLONOSCOPY N/A 7/10/2018    Procedure: COLONOSCOPY;  Colonoscopy ([facility])  ;  Surgeon: Lino Lopez MD;  Location:  GI     ESOPHAGOSCOPY, GASTROSCOPY, DUODENOSCOPY (EGD), COMBINED       ESOPHAGOSCOPY, GASTROSCOPY, DUODENOSCOPY (EGD), COMBINED N/A 12/18/2015    Procedure: COMBINED ESOPHAGOSCOPY, GASTROSCOPY, DUODENOSCOPY (EGD), BIOPSY SINGLE OR MULTIPLE;  Surgeon: Lino Lopez MD;  Location:  GI     GI SURGERY      bariatric surgery      HYSTERECTOMY, PAP NO LONGER INDICATED       ORTHOPEDIC SURGERY         Social History     Tobacco Use     Smoking status: Never Smoker     Smokeless tobacco: Never Used   Substance Use Topics     Alcohol use: Yes     Alcohol/week: 0.0 standard drinks     Comment: rare     Family History   Problem Relation Age of Onset     Osteoporosis Mother      Unknown/Adopted Father            Reviewed and updated as needed this visit by Provider         Review of Systems   CONSTITUTIONAL: NEGATIVE for fever, chills, change in weight  RESP: NEGATIVE for significant cough or SOB  CV: NEGATIVE for chest pain, palpitations or peripheral edema      Objective             Physical Exam     GENERAL: Healthy, alert and no distress  EYES: Eyes grossly normal to inspection.  No discharge or erythema, or obvious scleral/conjunctival abnormalities.  RESP: No audible wheeze, cough, or visible cyanosis.  No visible retractions or increased work of breathing.    SKIN: Visible skin clear. No significant rash, abnormal pigmentation or lesions.  NEURO: Cranial nerves grossly intact.  Mentation and speech appropriate for age.  PSYCH: Mentation appears normal, affect normal/bright, judgement and insight intact, normal speech and appearance well-groomed.      Diagnostic Test Results:  Labs reviewed in Epic        Assessment & Plan       (R42) Dizziness  (primary encounter diagnosis)  Comment: possibly from iron deficiency/anemia  Plan: lab appt and orthostatic bp/pulse    (E61.1) Iron deficiency  Comment: *assess  Plan: **CBC with platelets FUTURE anytime, Iron and         iron binding capacity, **Ferritin FUTURE 2mo            (E53.8) Vitamin B12 deficiency (non anemic)  Comment: assess  Plan: **Vitamin B12 FUTURE 2mo           (E03.9) Hypothyroidism, unspecified type  Comment: asess  Plan: **TSH with free T4 reflex FUTURE anytime           (R20.0,  R20.2) Numbness and tingling  Comment: assess  Plan: **Comprehensive metabolic panel FUTURE  anytime,        **TSH with free T4 reflex FUTURE anytime,         **Vitamin B12 FUTURE 2mo, Magnesium           (E16.2) Hypoglycemia  Comment:   Plan: **A1C FUTURE anytime           TAJ.  Labs first  Consider medication- zoloft consider  Counselor- Kaitlynn    No follow-ups on file.    Eulalia Broussard MD  [facility]      Video-Visit Details    Type of service:  Video Visit    Video End Time:4:05pm    Originating Location (pt. Location):home    Distant Location (provider location):  home    Platform used for Video Visit: doximity    No follow-ups on file.       Eulalia Broussard MD

## 2020-07-01 NOTE — TELEPHONE ENCOUNTER
Patient calling stating she is having numbness in the tips of her toes (L>R) and dizziness.  Patient stated her symptoms have been worsening over the past couple days.  Patient stated that when she stands up, she has to grab onto furniture to keep from falling.  Patient stated that the numbness is intermittent.  Patient stated she gets occasional headaches, but not recently. Patient denied any chest pain, difficulty breathing, palpitations, vision loss, double vision, changes in speech, fever, weakness on one side of body, any recent injury.  Patient stated her toes turn purple when she sits with them in dependent position. Patient also c/o being cold all the time, no appetite and occasional nausea. Patient has not been exposed to Covid.  Assisted in scheduling a vv with primary care provider for today at 3:20 pm. Patient advised to call back with any further concerns or if symptoms worsen. Patient verbalized understanding.     Additional Information    Negative: Difficult to awaken or acting confused (e.g., disoriented, slurred speech)    Negative: New neurologic deficit that is present NOW, sudden onset of ANY of the following: * Weakness of the face, arm, or leg on one side of the body * Numbness of the face, arm, or leg on one side of the body * Loss of speech or garbled speech    Negative: Sounds like a life-threatening emergency to the triager    Negative: Confusion, disorientation, or hallucinations is the main symptom    Negative: Dizziness is the main symptom    Negative: Followed a head injury within last 3 days    Negative: Headache (with neurologic deficit)    Negative: Unable to urinate (or only a few drops) and bladder feels very full    Negative: Loss of control of bowel or bladder (i.e., incontinence) of new onset    Negative: Back pain with numbness (loss of sensation) in groin or rectal area    Negative: Patient sounds very sick or weak to the triager    Negative: Neurologic deficit that was brief  "(now gone), ANY of the following: * Weakness of the face, arm, or leg on one side of the body * Numbness of the face, arm, or leg on one side of the body * Loss of speech or garbled speech    Negative: [place] palsy suspected (i.e., weakness only one side of the face, developing over hours to days, no other symptoms)    Negative: Tingling (e.g., pins and needles) of the face, arm or leg on one side of the body, that is  present now    Neurologic deficit of gradual onset, ANY of the following: * Weakness of the face, arm, or leg on one side of the body * Numbness of the face, arm, or leg on one side of the body * Loss of speech or garbled speech    Answer Assessment - Initial Assessment Questions  1. SYMPTOM: \"What is the main symptom you are concerned about?\" (e.g., weakness, numbness)      numbness  2. ONSET: \"When did this start?\" (minutes, hours, days; while sleeping)      It has been gradually getting worse over the past couple days.  3. LAST NORMAL: \"When was the last time you were normal (no symptoms)?\"      Couple weeks ago  4. PATTERN \"Does this come and go, or has it been constant since it started?\"  \"Is it present now?\"      intermittent  5. CARDIAC SYMPTOMS: \"Have you had any of the following symptoms: chest pain, difficulty breathing, palpitations?\"      no  6. NEUROLOGIC SYMPTOMS: \"Have you had any of the following symptoms: headache, dizziness, vision loss, double vision, changes in speech, unsteady on your feet?\"      Dizziness, headaches-intermittent, unsteadiness of feet secondary to dizziness  7. OTHER SYMPTOMS: \"Do you have any other symptoms?\"      no  8. PREGNANCY: \"Is there any chance you are pregnant?\" \"When was your last menstrual period?\"      no    Protocols used: NEUROLOGIC DEFICIT-A-[place]      "

## 2020-07-02 ASSESSMENT — ANXIETY QUESTIONNAIRES: GAD7 TOTAL SCORE: 10

## 2020-07-03 ENCOUNTER — ALLIED HEALTH/NURSE VISIT (OUTPATIENT)
Dept: NURSING | Facility: CLINIC | Age: 50
End: 2020-07-03
Payer: COMMERCIAL

## 2020-07-03 VITALS — SYSTOLIC BLOOD PRESSURE: 137 MMHG | DIASTOLIC BLOOD PRESSURE: 88 MMHG

## 2020-07-03 DIAGNOSIS — E61.1 IRON DEFICIENCY: ICD-10-CM

## 2020-07-03 DIAGNOSIS — E16.2 HYPOGLYCEMIA: ICD-10-CM

## 2020-07-03 DIAGNOSIS — R20.0 NUMBNESS AND TINGLING: ICD-10-CM

## 2020-07-03 DIAGNOSIS — E03.9 HYPOTHYROIDISM, UNSPECIFIED TYPE: ICD-10-CM

## 2020-07-03 DIAGNOSIS — E53.8 VITAMIN B12 DEFICIENCY (NON ANEMIC): ICD-10-CM

## 2020-07-03 DIAGNOSIS — I10 HTN (HYPERTENSION): Primary | ICD-10-CM

## 2020-07-03 DIAGNOSIS — R20.2 NUMBNESS AND TINGLING: ICD-10-CM

## 2020-07-03 LAB
ERYTHROCYTE [DISTWIDTH] IN BLOOD BY AUTOMATED COUNT: 16.3 % (ref 10–15)
HBA1C MFR BLD: 5.2 % (ref 0–5.6)
HCT VFR BLD AUTO: 33 % (ref 35–47)
HGB BLD-MCNC: 10.2 G/DL (ref 11.7–15.7)
MCH RBC QN AUTO: 24.6 PG (ref 26.5–33)
MCHC RBC AUTO-ENTMCNC: 30.9 G/DL (ref 31.5–36.5)
MCV RBC AUTO: 80 FL (ref 78–100)
PLATELET # BLD AUTO: 218 10E9/L (ref 150–450)
RBC # BLD AUTO: 4.15 10E12/L (ref 3.8–5.2)
VIT B12 SERPL-MCNC: 162 PG/ML (ref 193–986)
WBC # BLD AUTO: 4 10E9/L (ref 4–11)

## 2020-07-03 PROCEDURE — 36415 COLL VENOUS BLD VENIPUNCTURE: CPT | Performed by: INTERNAL MEDICINE

## 2020-07-03 PROCEDURE — 84439 ASSAY OF FREE THYROXINE: CPT | Performed by: INTERNAL MEDICINE

## 2020-07-03 PROCEDURE — 85027 COMPLETE CBC AUTOMATED: CPT | Performed by: INTERNAL MEDICINE

## 2020-07-03 PROCEDURE — 83550 IRON BINDING TEST: CPT | Performed by: INTERNAL MEDICINE

## 2020-07-03 PROCEDURE — 84443 ASSAY THYROID STIM HORMONE: CPT | Performed by: INTERNAL MEDICINE

## 2020-07-03 PROCEDURE — 83540 ASSAY OF IRON: CPT | Performed by: INTERNAL MEDICINE

## 2020-07-03 PROCEDURE — 80053 COMPREHEN METABOLIC PANEL: CPT | Performed by: INTERNAL MEDICINE

## 2020-07-03 PROCEDURE — 83036 HEMOGLOBIN GLYCOSYLATED A1C: CPT | Performed by: INTERNAL MEDICINE

## 2020-07-03 PROCEDURE — 83735 ASSAY OF MAGNESIUM: CPT | Performed by: INTERNAL MEDICINE

## 2020-07-03 PROCEDURE — 82728 ASSAY OF FERRITIN: CPT | Performed by: INTERNAL MEDICINE

## 2020-07-03 PROCEDURE — 82607 VITAMIN B-12: CPT | Performed by: INTERNAL MEDICINE

## 2020-07-03 NOTE — PROGRESS NOTES
Patient here for bp check. Bp was 129/74, patient stated that that was very high for her. Writer noticed that patient had edema in both legs and toes were purple and well and fingers. Patient stated that she was freezing with a temp of 97.4 she also feels light headed and brain foggy and no appetite. Writer conferred with OLGA Sauceda we both agreed to confer with .     Dr Marquez was consulted and agreed to check patient out.      REZA OROPEZA CMA

## 2020-07-04 LAB
ALBUMIN SERPL-MCNC: 3.6 G/DL (ref 3.4–5)
ALP SERPL-CCNC: 76 U/L (ref 40–150)
ALT SERPL W P-5'-P-CCNC: 19 U/L (ref 0–50)
ANION GAP SERPL CALCULATED.3IONS-SCNC: 4 MMOL/L (ref 3–14)
AST SERPL W P-5'-P-CCNC: 19 U/L (ref 0–45)
BILIRUB SERPL-MCNC: 0.3 MG/DL (ref 0.2–1.3)
BUN SERPL-MCNC: 11 MG/DL (ref 7–30)
CALCIUM SERPL-MCNC: 9 MG/DL (ref 8.5–10.1)
CHLORIDE SERPL-SCNC: 110 MMOL/L (ref 94–109)
CO2 SERPL-SCNC: 26 MMOL/L (ref 20–32)
CREAT SERPL-MCNC: 0.7 MG/DL (ref 0.52–1.04)
FERRITIN SERPL-MCNC: 5 NG/ML (ref 8–252)
GFR SERPL CREATININE-BSD FRML MDRD: >90 ML/MIN/{1.73_M2}
GLUCOSE SERPL-MCNC: 86 MG/DL (ref 70–99)
IRON SATN MFR SERPL: 8 % (ref 15–46)
IRON SERPL-MCNC: 24 UG/DL (ref 35–180)
MAGNESIUM SERPL-MCNC: 2.1 MG/DL (ref 1.6–2.3)
POTASSIUM SERPL-SCNC: 3.9 MMOL/L (ref 3.4–5.3)
PROT SERPL-MCNC: 6.9 G/DL (ref 6.8–8.8)
SODIUM SERPL-SCNC: 140 MMOL/L (ref 133–144)
T4 FREE SERPL-MCNC: 1.79 NG/DL (ref 0.76–1.46)
TIBC SERPL-MCNC: 311 UG/DL (ref 240–430)
TSH SERPL DL<=0.005 MIU/L-ACNC: 0.04 MU/L (ref 0.4–4)

## 2020-07-06 RX ORDER — LEVOTHYROXINE SODIUM 175 UG/1
TABLET ORAL
Qty: 90 TABLET | Refills: 0 | OUTPATIENT
Start: 2020-07-06

## 2020-07-06 RX ORDER — LEVOTHYROXINE SODIUM 150 UG/1
150 TABLET ORAL DAILY
Qty: 90 TABLET | Refills: 0 | Status: SHIPPED | OUTPATIENT
Start: 2020-07-06 | End: 2020-09-29

## 2020-07-06 NOTE — TELEPHONE ENCOUNTER
Please let patient know her thyroid reveals too much thyroid.  Recommend levothyroxine 150mcg and repeating level in 6 weeks.    The iron is low.  Recommend ferrous sulfate 325mg every other day for 3 months.    B12 is low. Recommend monthly B12 injections.

## 2020-07-08 ENCOUNTER — TELEPHONE (OUTPATIENT)
Dept: INTERNAL MEDICINE | Facility: CLINIC | Age: 50
End: 2020-07-08

## 2020-07-08 DIAGNOSIS — D50.9 IRON DEFICIENCY ANEMIA, UNSPECIFIED IRON DEFICIENCY ANEMIA TYPE: Primary | ICD-10-CM

## 2020-07-08 NOTE — TELEPHONE ENCOUNTER
Patient called requesting a phone call to discuss lab results.  Patient states she is till very swollen.

## 2020-07-08 NOTE — TELEPHONE ENCOUNTER
Please advise pt labs from 7/3 show iron deficiency, low TSH  And mildly low B12.  neds ferrous sulfate 325 mg daily, continue monthly B12 injections, and 7/6 [facility] sent lower dose synthroid eRx.  Plan to recheck labs in 4-6 weeks.  All other labs WNL.  Ashanti Metzger CNP

## 2020-07-08 NOTE — TELEPHONE ENCOUNTER
Call to pt and advised. She states she cannot take IRON pills.   She states she has received iron infusions in the past.   There is nothing noted on her therapy plan.     Please advise if should wait for PCP?   Or if covering provider would order now?

## 2020-07-13 ENCOUNTER — TELEPHONE (OUTPATIENT)
Dept: ONCOLOGY | Facility: CLINIC | Age: 50
End: 2020-07-13

## 2020-07-13 NOTE — TELEPHONE ENCOUNTER
I spoke to the patient and she doesn't believe she needs this appointment because she has been receiving her infusions at [facility]. She said she will check with her provider and if she needs to schedule she will give us a callback. I gave her our number.

## 2020-07-14 NOTE — TELEPHONE ENCOUNTER
Call to patient. See 7/13 telephone encounter regarding oncology referral. Patient did not understand why the referral was made to [facility] if she has seen [facility] in the past. Advised writer believes this is just the way the referral comes up in the computer now. Discussed requesting referral to [facility] from Dr. Broussard. Patient states if she is going to need a new referral she may just go with [facility]. Advised since she has not been seen by [facility] within the past year she will likely need a new referral but she could try calling [facility] to ask. Patient states she will just go through [facility]. Patient will call to schedule.

## 2020-07-15 RX ORDER — FERROUS SULFATE 325(65) MG
TABLET, DELAYED RELEASE (ENTERIC COATED) ORAL
Qty: 45 TABLET | Refills: 2 | Status: SHIPPED | OUTPATIENT
Start: 2020-07-15 | End: 2021-03-30

## 2020-07-15 RX ORDER — CYANOCOBALAMIN 1000 UG/ML
1 INJECTION, SOLUTION INTRAMUSCULAR; SUBCUTANEOUS
Qty: 3 ML | Refills: 3 | Status: SHIPPED | OUTPATIENT
Start: 2020-07-15 | End: 2022-06-22

## 2020-07-15 NOTE — TELEPHONE ENCOUNTER
Called patient and advised her of the message from the doctor.  Assisted in scheduling patient for a nurse only appointment to learn to do the B 12 injection.        Primary care provider please order the B12 to be sent to [facility] in [place] as patient has appointment on 7/20/20 to learn how to do injection.      Writer pended the Ferrous Sulfate and Vitamin B 12.  Please sign if you agree.  Thanks

## 2020-07-20 ENCOUNTER — ALLIED HEALTH/NURSE VISIT (OUTPATIENT)
Dept: NURSING | Facility: CLINIC | Age: 50
End: 2020-07-20
Payer: COMMERCIAL

## 2020-07-20 DIAGNOSIS — R79.89 LOW VITAMIN B12 LEVEL: Primary | ICD-10-CM

## 2020-07-20 NOTE — PROGRESS NOTES
Nurse teaching given on self-administration of B12 IM injections and the patient expresses understanding and acceptance of instructions. Patient successfully able to give patient supplied B12 injection to self during visit.      Sent patient prescription for needles and syringes to pharmacy as well.

## 2020-09-28 DIAGNOSIS — E05.00 GRAVES DISEASE: ICD-10-CM

## 2020-09-29 RX ORDER — LEVOTHYROXINE SODIUM 150 UG/1
TABLET ORAL
Qty: 90 TABLET | Refills: 0 | Status: SHIPPED | OUTPATIENT
Start: 2020-09-29 | End: 2021-03-15

## 2020-11-16 ENCOUNTER — HEALTH MAINTENANCE LETTER (OUTPATIENT)
Age: 50
End: 2020-11-16

## 2021-02-07 ENCOUNTER — HEALTH MAINTENANCE LETTER (OUTPATIENT)
Age: 51
End: 2021-02-07

## 2021-03-14 DIAGNOSIS — E05.00 GRAVES DISEASE: ICD-10-CM

## 2021-03-15 RX ORDER — LEVOTHYROXINE SODIUM 150 UG/1
TABLET ORAL
Qty: 90 TABLET | Refills: 0 | Status: SHIPPED | OUTPATIENT
Start: 2021-03-15 | End: 2021-06-11

## 2021-03-15 NOTE — TELEPHONE ENCOUNTER
Pending Prescriptions:                       Disp   Refills    levothyroxine (SYNTHROID/LEVOTHROID) 150 M*90 tab*0        Sig: TAKE 1 TABLET BY MOUTH EVERY DAY    Routing refill request to provider for review/approval because:  TSH   Date Value Ref Range Status   07/03/2020 0.04 (L) 0.40 - 4.00 mU/L Final

## 2021-03-16 NOTE — TELEPHONE ENCOUNTER
Appointment scheduled. Patient only has a 3 pills left and hoping for a refill now.  Ayleen Ortiz, MORGAN

## 2021-03-20 ENCOUNTER — MYC MEDICAL ADVICE (OUTPATIENT)
Dept: INTERNAL MEDICINE | Facility: CLINIC | Age: 51
End: 2021-03-20

## 2021-03-30 ENCOUNTER — OFFICE VISIT (OUTPATIENT)
Dept: INTERNAL MEDICINE | Facility: CLINIC | Age: 51
End: 2021-03-30
Payer: COMMERCIAL

## 2021-03-30 ENCOUNTER — IMMUNIZATION (OUTPATIENT)
Dept: NURSING | Facility: CLINIC | Age: 51
End: 2021-03-30
Payer: COMMERCIAL

## 2021-03-30 VITALS
DIASTOLIC BLOOD PRESSURE: 74 MMHG | RESPIRATION RATE: 12 BRPM | OXYGEN SATURATION: 100 % | BODY MASS INDEX: 23.39 KG/M2 | HEIGHT: 67 IN | WEIGHT: 149 LBS | SYSTOLIC BLOOD PRESSURE: 120 MMHG | HEART RATE: 72 BPM

## 2021-03-30 DIAGNOSIS — Z98.84 STATUS POST GASTRIC BYPASS FOR OBESITY: ICD-10-CM

## 2021-03-30 DIAGNOSIS — Z00.00 ROUTINE GENERAL MEDICAL EXAMINATION AT A HEALTH CARE FACILITY: Primary | ICD-10-CM

## 2021-03-30 DIAGNOSIS — D50.9 IRON DEFICIENCY ANEMIA, UNSPECIFIED IRON DEFICIENCY ANEMIA TYPE: ICD-10-CM

## 2021-03-30 DIAGNOSIS — I73.00 RAYNAUD'S DISEASE WITHOUT GANGRENE: ICD-10-CM

## 2021-03-30 DIAGNOSIS — E05.00 GRAVES DISEASE: ICD-10-CM

## 2021-03-30 LAB
ALBUMIN SERPL-MCNC: 3.8 G/DL (ref 3.4–5)
ALP SERPL-CCNC: 99 U/L (ref 40–150)
ALT SERPL W P-5'-P-CCNC: 24 U/L (ref 0–50)
ANION GAP SERPL CALCULATED.3IONS-SCNC: 8 MMOL/L (ref 3–14)
AST SERPL W P-5'-P-CCNC: 17 U/L (ref 0–45)
BASOPHILS # BLD AUTO: 0 10E9/L (ref 0–0.2)
BASOPHILS NFR BLD AUTO: 0.5 %
BILIRUB SERPL-MCNC: 0.4 MG/DL (ref 0.2–1.3)
BUN SERPL-MCNC: 11 MG/DL (ref 7–30)
CALCIUM SERPL-MCNC: 9 MG/DL (ref 8.5–10.1)
CHLORIDE SERPL-SCNC: 108 MMOL/L (ref 94–109)
CO2 SERPL-SCNC: 24 MMOL/L (ref 20–32)
CREAT SERPL-MCNC: 0.74 MG/DL (ref 0.52–1.04)
DIFFERENTIAL METHOD BLD: ABNORMAL
EOSINOPHIL # BLD AUTO: 0.2 10E9/L (ref 0–0.7)
EOSINOPHIL NFR BLD AUTO: 3 %
ERYTHROCYTE [DISTWIDTH] IN BLOOD BY AUTOMATED COUNT: 15.6 % (ref 10–15)
GFR SERPL CREATININE-BSD FRML MDRD: >90 ML/MIN/{1.73_M2}
GLUCOSE SERPL-MCNC: 72 MG/DL (ref 70–99)
HBA1C MFR BLD: 5 % (ref 0–5.6)
HCT VFR BLD AUTO: 35.2 % (ref 35–47)
HGB BLD-MCNC: 11 G/DL (ref 11.7–15.7)
LYMPHOCYTES # BLD AUTO: 1.2 10E9/L (ref 0.8–5.3)
LYMPHOCYTES NFR BLD AUTO: 19 %
MCH RBC QN AUTO: 24.7 PG (ref 26.5–33)
MCHC RBC AUTO-ENTMCNC: 31.3 G/DL (ref 31.5–36.5)
MCV RBC AUTO: 79 FL (ref 78–100)
MONOCYTES # BLD AUTO: 0.6 10E9/L (ref 0–1.3)
MONOCYTES NFR BLD AUTO: 10 %
NEUTROPHILS # BLD AUTO: 4.2 10E9/L (ref 1.6–8.3)
NEUTROPHILS NFR BLD AUTO: 67.5 %
PLATELET # BLD AUTO: 252 10E9/L (ref 150–450)
POTASSIUM SERPL-SCNC: 4.2 MMOL/L (ref 3.4–5.3)
PROT SERPL-MCNC: 7.4 G/DL (ref 6.8–8.8)
RBC # BLD AUTO: 4.46 10E12/L (ref 3.8–5.2)
SODIUM SERPL-SCNC: 140 MMOL/L (ref 133–144)
T4 FREE SERPL-MCNC: 1.34 NG/DL (ref 0.76–1.46)
TSH SERPL DL<=0.005 MIU/L-ACNC: 0.15 MU/L (ref 0.4–4)
VIT B12 SERPL-MCNC: 341 PG/ML (ref 193–986)
WBC # BLD AUTO: 6.3 10E9/L (ref 4–11)

## 2021-03-30 PROCEDURE — 83036 HEMOGLOBIN GLYCOSYLATED A1C: CPT | Performed by: INTERNAL MEDICINE

## 2021-03-30 PROCEDURE — 99213 OFFICE O/P EST LOW 20 MIN: CPT | Mod: 25 | Performed by: INTERNAL MEDICINE

## 2021-03-30 PROCEDURE — 82728 ASSAY OF FERRITIN: CPT | Performed by: INTERNAL MEDICINE

## 2021-03-30 PROCEDURE — 83540 ASSAY OF IRON: CPT | Performed by: INTERNAL MEDICINE

## 2021-03-30 PROCEDURE — 99396 PREV VISIT EST AGE 40-64: CPT | Performed by: INTERNAL MEDICINE

## 2021-03-30 PROCEDURE — 0001A PR COVID VAC PFIZER DIL RECON 30 MCG/0.3 ML IM: CPT

## 2021-03-30 PROCEDURE — 83550 IRON BINDING TEST: CPT | Performed by: INTERNAL MEDICINE

## 2021-03-30 PROCEDURE — 80050 GENERAL HEALTH PANEL: CPT | Performed by: INTERNAL MEDICINE

## 2021-03-30 PROCEDURE — 96127 BRIEF EMOTIONAL/BEHAV ASSMT: CPT | Performed by: INTERNAL MEDICINE

## 2021-03-30 PROCEDURE — 84439 ASSAY OF FREE THYROXINE: CPT | Performed by: INTERNAL MEDICINE

## 2021-03-30 PROCEDURE — 82607 VITAMIN B-12: CPT | Performed by: INTERNAL MEDICINE

## 2021-03-30 PROCEDURE — 91300 PR COVID VAC PFIZER DIL RECON 30 MCG/0.3 ML IM: CPT

## 2021-03-30 PROCEDURE — 36415 COLL VENOUS BLD VENIPUNCTURE: CPT | Performed by: INTERNAL MEDICINE

## 2021-03-30 RX ORDER — AMLODIPINE BESYLATE 5 MG/1
5 TABLET ORAL DAILY
Qty: 90 TABLET | Refills: 0 | Status: SHIPPED | OUTPATIENT
Start: 2021-03-30 | End: 2022-06-22

## 2021-03-30 ASSESSMENT — PATIENT HEALTH QUESTIONNAIRE - PHQ9
SUM OF ALL RESPONSES TO PHQ QUESTIONS 1-9: 10
10. IF YOU CHECKED OFF ANY PROBLEMS, HOW DIFFICULT HAVE THESE PROBLEMS MADE IT FOR YOU TO DO YOUR WORK, TAKE CARE OF THINGS AT HOME, OR GET ALONG WITH OTHER PEOPLE: SOMEWHAT DIFFICULT
SUM OF ALL RESPONSES TO PHQ QUESTIONS 1-9: 10

## 2021-03-30 ASSESSMENT — ENCOUNTER SYMPTOMS
ABDOMINAL PAIN: 1
FEVER: 0
ARTHRALGIAS: 1
PARESTHESIAS: 1
SORE THROAT: 0
HEARTBURN: 0
HEADACHES: 0
DYSURIA: 0
WEAKNESS: 0
MYALGIAS: 0
SHORTNESS OF BREATH: 0
DIZZINESS: 1
NAUSEA: 0
PALPITATIONS: 0
NERVOUS/ANXIOUS: 0
JOINT SWELLING: 1
HEMATOCHEZIA: 0
CONSTIPATION: 0
FREQUENCY: 1
CHILLS: 1
HEMATURIA: 0
DIARRHEA: 1
EYE PAIN: 0
COUGH: 0

## 2021-03-30 ASSESSMENT — MIFFLIN-ST. JEOR: SCORE: 1305.55

## 2021-03-30 NOTE — PROGRESS NOTES
SUBJECTIVE:   CC: Carlie Brumfield is an 51 year old woman who presents for preventive health visit.       Patient has been advised of split billing requirements and indicates understanding: Yes  Healthy Habits:     Getting at least 3 servings of Calcium per day:  NO    Bi-annual eye exam:  Yes    Dental care twice a year:  NO    Sleep apnea or symptoms of sleep apnea:  None    Diet:  Regular (no restrictions)    Frequency of exercise:  None    Taking medications regularly:  Yes    Medication side effects:  None    PHQ-2 Total Score: 2    Additional concerns today:  No    Iron deficiency. She has had issues with insurance covering this.   She had been going to [facility] in the past.     Depression.   PHQ 5/31/2019 7/1/2020 3/30/2021   PHQ-9 Total Score 8 10 10   Q9: Thoughts of better off dead/self-harm past 2 weeks Not at all Not at all Not at all       Today's PHQ-2 Score:   PHQ-2 ( 1999 [employer]) 3/30/2021   Q1: Little interest or pleasure in doing things 1   Q2: Feeling down, depressed or hopeless 1   PHQ-2 Score 2   Q1: Little interest or pleasure in doing things Several days   Q2: Feeling down, depressed or hopeless Several days   PHQ-2 Score 2       Abuse: Current or Past (Physical, Sexual or Emotional) - No  Do you feel safe in your environment? Yes    Have you ever done Advance Care Planning? (For example, a Health Directive, POLST, or a discussion with a medical provider or your loved ones about your wishes): Yes, patient states has an Advance Care Planning document and will bring a copy to the clinic.    Social History     Tobacco Use     Smoking status: Never Smoker     Smokeless tobacco: Never Used   Substance Use Topics     Alcohol use: Yes     Alcohol/week: 0.0 standard drinks     Comment: rare         Alcohol Use 3/30/2021   Prescreen: >3 drinks/day or >7 drinks/week? No   Prescreen: >3 drinks/day or >7 drinks/week? -     Reviewed orders with patient.  Reviewed health maintenance and updated orders  "accordingly    Breast Cancer Screening:  Any new diagnosis of family breast, ovarian, or bowel cancer? No    FSH-7: No flowsheet data found.  click delete button to remove this line now  Mammogram Screening: Recommended annual mammography  Pertinent mammograms are reviewed under the imaging tab.    History of abnormal Pap smear: Status post benign hysterectomy. Health Maintenance and Surgical History updated.     Reviewed and updated as needed this visit by clinical staff  Tobacco  Allergies  Meds   Med Hx  Surg Hx  Fam Hx  Soc Hx        Reviewed and updated as needed this visit by Provider                    Review of Systems   Constitutional: Positive for chills. Negative for fever.   HENT: Negative for congestion, ear pain, hearing loss and sore throat.    Eyes: Negative for pain and visual disturbance.   Respiratory: Negative for cough and shortness of breath.    Cardiovascular: Positive for peripheral edema. Negative for chest pain and palpitations.   Gastrointestinal: Positive for abdominal pain and diarrhea. Negative for constipation, heartburn, hematochezia and nausea.   Genitourinary: Positive for frequency and urgency. Negative for dysuria, genital sores and hematuria.   Musculoskeletal: Positive for arthralgias and joint swelling. Negative for myalgias.   Skin: Negative for rash.   Neurological: Positive for dizziness and paresthesias. Negative for weakness and headaches.   Psychiatric/Behavioral: Negative for mood changes. The patient is not nervous/anxious.       raynaud's patient reports fingers turn red, white, and then a bluish hue in the cold    OBJECTIVE:   /74   Pulse 72   Resp 12   Ht 1.689 m (5' 6.5\")   Wt 67.6 kg (149 lb)   LMP 06/02/2011   SpO2 100%   Breastfeeding No   BMI 23.69 kg/m    Physical Exam  GENERAL APPEARANCE: healthy, alert and no distress  EYES: Eyes grossly normal to inspection, PERRL and conjunctivae and sclerae normal  HENT: ear canals and TM's normal, nose " and mouth without ulcers or lesions, oropharynx clear and oral mucous membranes moist  NECK: no adenopathy, no asymmetry, masses, or scars and thyroid normal to palpation  RESP: lungs clear to auscultation - no rales, rhonchi or wheezes  BREAST: normal without masses, tenderness or nipple discharge and no palpable axillary masses or adenopathy  CV: regular rate and rhythm, normal S1 S2, no S3 or S4, no murmur, click or rub, no peripheral edema and peripheral pulses strong  ABDOMEN: soft, nontender, no hepatosplenomegaly, no masses and bowel sounds normal  MS: no musculoskeletal defects are noted and gait is age appropriate without ataxia  SKIN: no suspicious lesions or rashes  NEURO: Normal strength and tone, sensory exam grossly normal, mentation intact and speech normal  PSYCH: mentation appears normal and affect normal/bright    Diagnostic Test Results:  Labs reviewed in Epic    ASSESSMENT/PLAN:       (Z00.00) Routine general medical examination at a health care facility  (primary encounter diagnosis)  Comment:   Plan: CBC with platelets and differential,         Comprehensive metabolic panel (BMP + Alb, Alk         Phos, ALT, AST, Total. Bili, TP), TSH with free        T4 reflex, Hemoglobin A1c, *MA Screening         Digital Bilateral, T4 free            (E05.00) Graves disease  Comment:   Plan: assess thyroid    (Z98.84) Status post gastric bypass for obesity  Comment: assess  Plan: Vitamin B12            (D50.9) Iron deficiency anemia, unspecified iron deficiency anemia type  Comment: s/p gastric bypass  Plan: Oncology/Hematology Adult Referral, Iron and         iron binding capacity, Ferritin            (I73.00) Raynaud's disease without gangrene  Comment: trial of norvasc   Plan: amLODIPine (NORVASC) 5 MG tablet              PHQ9 elevated secondary to fatigue. She reports she does not feel depressed.    Patient has been advised of split billing requirements and indicates understanding:  "Yes  COUNSELING:  Reviewed preventive health counseling, as reflected in patient instructions       Regular exercise       Healthy diet/nutrition    Estimated body mass index is 23.69 kg/m  as calculated from the following:    Height as of this encounter: 1.689 m (5' 6.5\").    Weight as of this encounter: 67.6 kg (149 lb).        She reports that she has never smoked. She has never used smokeless tobacco.      Counseling Resources:  ATP IV Guidelines  Pooled Cohorts Equation Calculator  Breast Cancer Risk Calculator  BRCA-Related Cancer Risk Assessment: FHS-7 Tool  FRAX Risk Assessment  ICSI Preventive Guidelines  Dietary Guidelines for Americans, 2010  [employer]eg[employer]'s MyPlate  ASA Prophylaxis  Lung CA Screening    Eulalia Broussard MD  [facility]  Answers for HPI/ROS submitted by the patient on 3/30/2021   Annual Exam:  If you checked off any problems, how difficult have these problems made it for you to do your work, take care of things at home, or get along with other people?: Somewhat difficult  PHQ9 TOTAL SCORE: 10    "

## 2021-03-30 NOTE — NURSING NOTE
"/74   Pulse 72   Resp 12   Ht 1.689 m (5' 6.5\")   Wt 67.6 kg (149 lb)   LMP 06/02/2011   SpO2 100%   Breastfeeding No   BMI 23.69 kg/m      "

## 2021-03-31 LAB
FERRITIN SERPL-MCNC: 8 NG/ML (ref 8–252)
IRON SATN MFR SERPL: 13 % (ref 15–46)
IRON SERPL-MCNC: 47 UG/DL (ref 35–180)
TIBC SERPL-MCNC: 371 UG/DL (ref 240–430)

## 2021-03-31 ASSESSMENT — PATIENT HEALTH QUESTIONNAIRE - PHQ9: SUM OF ALL RESPONSES TO PHQ QUESTIONS 1-9: 10

## 2021-04-29 ENCOUNTER — IMMUNIZATION (OUTPATIENT)
Dept: NURSING | Facility: CLINIC | Age: 51
End: 2021-04-29
Attending: INTERNAL MEDICINE
Payer: COMMERCIAL

## 2021-04-29 PROCEDURE — 0002A PR COVID VAC PFIZER DIL RECON 30 MCG/0.3 ML IM: CPT

## 2021-04-29 PROCEDURE — 91300 PR COVID VAC PFIZER DIL RECON 30 MCG/0.3 ML IM: CPT

## 2021-06-10 DIAGNOSIS — E05.00 GRAVES DISEASE: ICD-10-CM

## 2021-06-11 RX ORDER — LEVOTHYROXINE SODIUM 150 UG/1
TABLET ORAL
Qty: 90 TABLET | Refills: 0 | Status: SHIPPED | OUTPATIENT
Start: 2021-06-11 | End: 2021-09-17

## 2021-06-11 NOTE — TELEPHONE ENCOUNTER
Pending Prescriptions:                       Disp   Refills    levothyroxine (SYNTHROID/LEVOTHROID) 150 M*90 tab*0        Sig: TAKE 1 TABLET BY MOUTH EVERY DAY    Routing refill request to provider for review/approval because:  TSH   Date Value Ref Range Status   03/30/2021 0.15 (L) 0.40 - 4.00 mU/L Final

## 2021-09-17 DIAGNOSIS — E05.00 GRAVES DISEASE: ICD-10-CM

## 2021-09-18 ENCOUNTER — HEALTH MAINTENANCE LETTER (OUTPATIENT)
Age: 51
End: 2021-09-18

## 2021-09-21 RX ORDER — LEVOTHYROXINE SODIUM 150 UG/1
150 TABLET ORAL DAILY
Qty: 90 TABLET | Refills: 0 | Status: SHIPPED | OUTPATIENT
Start: 2021-09-21 | End: 2022-05-31

## 2021-09-21 NOTE — TELEPHONE ENCOUNTER
Routing refill request to provider for review/approval because:  Labs out of range:      Routed to provider covering the letter F

## 2021-09-23 ENCOUNTER — OFFICE VISIT (OUTPATIENT)
Dept: URGENT CARE | Facility: URGENT CARE | Age: 51
End: 2021-09-23
Payer: COMMERCIAL

## 2021-09-23 ENCOUNTER — NURSE TRIAGE (OUTPATIENT)
Dept: INTERNAL MEDICINE | Facility: CLINIC | Age: 51
End: 2021-09-23

## 2021-09-23 ENCOUNTER — HOSPITAL ENCOUNTER (EMERGENCY)
Facility: CLINIC | Age: 51
Discharge: HOME | End: 2021-09-24
Attending: EMERGENCY MEDICINE | Admitting: EMERGENCY MEDICINE
Payer: COMMERCIAL

## 2021-09-23 ENCOUNTER — APPOINTMENT (OUTPATIENT)
Dept: CT IMAGING | Facility: CLINIC | Age: 51
End: 2021-09-23
Attending: EMERGENCY MEDICINE
Payer: COMMERCIAL

## 2021-09-23 VITALS
SYSTOLIC BLOOD PRESSURE: 130 MMHG | HEART RATE: 57 BPM | BODY MASS INDEX: 24.55 KG/M2 | TEMPERATURE: 98.2 F | OXYGEN SATURATION: 100 % | WEIGHT: 154.4 LBS | RESPIRATION RATE: 16 BRPM | DIASTOLIC BLOOD PRESSURE: 68 MMHG

## 2021-09-23 DIAGNOSIS — R07.9 CHEST PAIN, UNSPECIFIED TYPE: ICD-10-CM

## 2021-09-23 DIAGNOSIS — R07.89 OTHER CHEST PAIN: Primary | ICD-10-CM

## 2021-09-23 LAB
ANION GAP SERPL CALCULATED.3IONS-SCNC: 8 MMOL/L (ref 3–14)
BASOPHILS # BLD AUTO: 0 10E3/UL (ref 0–0.2)
BASOPHILS NFR BLD AUTO: 1 %
BUN SERPL-MCNC: 9 MG/DL (ref 7–30)
CALCIUM SERPL-MCNC: 9 MG/DL (ref 8.5–10.1)
CHLORIDE BLD-SCNC: 106 MMOL/L (ref 94–109)
CO2 SERPL-SCNC: 27 MMOL/L (ref 20–32)
CREAT SERPL-MCNC: 0.76 MG/DL (ref 0.52–1.04)
D DIMER PPP FEU-MCNC: 0.54 UG/ML FEU (ref 0–0.5)
EOSINOPHIL # BLD AUTO: 0.2 10E3/UL (ref 0–0.7)
EOSINOPHIL NFR BLD AUTO: 3 %
ERYTHROCYTE [DISTWIDTH] IN BLOOD BY AUTOMATED COUNT: 17.1 % (ref 10–15)
GFR SERPL CREATININE-BSD FRML MDRD: 90 ML/MIN/1.73M2
GLUCOSE BLD-MCNC: 81 MG/DL (ref 70–99)
HCT VFR BLD AUTO: 36.6 % (ref 35–47)
HGB BLD-MCNC: 11.1 G/DL (ref 11.7–15.7)
HOLD SPECIMEN: NORMAL
IMM GRANULOCYTES # BLD: 0 10E3/UL
IMM GRANULOCYTES NFR BLD: 0 %
LYMPHOCYTES # BLD AUTO: 2.1 10E3/UL (ref 0.8–5.3)
LYMPHOCYTES NFR BLD AUTO: 36 %
MCH RBC QN AUTO: 24 PG (ref 26.5–33)
MCHC RBC AUTO-ENTMCNC: 30.3 G/DL (ref 31.5–36.5)
MCV RBC AUTO: 79 FL (ref 78–100)
MONOCYTES # BLD AUTO: 0.4 10E3/UL (ref 0–1.3)
MONOCYTES NFR BLD AUTO: 7 %
NEUTROPHILS # BLD AUTO: 3.2 10E3/UL (ref 1.6–8.3)
NEUTROPHILS NFR BLD AUTO: 53 %
NRBC # BLD AUTO: 0 10E3/UL
NRBC BLD AUTO-RTO: 0 /100
PLATELET # BLD AUTO: 247 10E3/UL (ref 150–450)
POTASSIUM BLD-SCNC: 3.8 MMOL/L (ref 3.4–5.3)
RBC # BLD AUTO: 4.62 10E6/UL (ref 3.8–5.2)
SODIUM SERPL-SCNC: 141 MMOL/L (ref 133–144)
TROPONIN I SERPL-MCNC: <0.015 UG/L (ref 0–0.04)
WBC # BLD AUTO: 5.9 10E3/UL (ref 4–11)

## 2021-09-23 PROCEDURE — 84484 ASSAY OF TROPONIN QUANT: CPT | Performed by: EMERGENCY MEDICINE

## 2021-09-23 PROCEDURE — 93005 ELECTROCARDIOGRAM TRACING: CPT

## 2021-09-23 PROCEDURE — 99285 EMERGENCY DEPT VISIT HI MDM: CPT | Mod: 25

## 2021-09-23 PROCEDURE — 80048 BASIC METABOLIC PNL TOTAL CA: CPT | Performed by: EMERGENCY MEDICINE

## 2021-09-23 PROCEDURE — 99214 OFFICE O/P EST MOD 30 MIN: CPT | Performed by: STUDENT IN AN ORGANIZED HEALTH CARE EDUCATION/TRAINING PROGRAM

## 2021-09-23 PROCEDURE — 71275 CT ANGIOGRAPHY CHEST: CPT

## 2021-09-23 PROCEDURE — 85025 COMPLETE CBC W/AUTO DIFF WBC: CPT | Performed by: EMERGENCY MEDICINE

## 2021-09-23 PROCEDURE — 250N000011 HC RX IP 250 OP 636: Performed by: EMERGENCY MEDICINE

## 2021-09-23 PROCEDURE — 93005 ELECTROCARDIOGRAM TRACING: CPT | Mod: 76

## 2021-09-23 PROCEDURE — 85379 FIBRIN DEGRADATION QUANT: CPT | Performed by: EMERGENCY MEDICINE

## 2021-09-23 PROCEDURE — 93000 ELECTROCARDIOGRAM COMPLETE: CPT | Performed by: STUDENT IN AN ORGANIZED HEALTH CARE EDUCATION/TRAINING PROGRAM

## 2021-09-23 PROCEDURE — 36415 COLL VENOUS BLD VENIPUNCTURE: CPT | Performed by: EMERGENCY MEDICINE

## 2021-09-23 PROCEDURE — 250N000009 HC RX 250: Performed by: EMERGENCY MEDICINE

## 2021-09-23 RX ORDER — IOPAMIDOL 755 MG/ML
500 INJECTION, SOLUTION INTRAVASCULAR ONCE
Status: COMPLETED | OUTPATIENT
Start: 2021-09-23 | End: 2021-09-23

## 2021-09-23 RX ADMIN — IOPAMIDOL 56 ML: 755 INJECTION, SOLUTION INTRAVENOUS at 23:20

## 2021-09-23 RX ADMIN — SODIUM CHLORIDE 77 ML: 9 INJECTION, SOLUTION INTRAVENOUS at 23:20

## 2021-09-23 ASSESSMENT — ENCOUNTER SYMPTOMS
FEVER: 0
VOMITING: 0
SHORTNESS OF BREATH: 0
CHILLS: 0
NAUSEA: 0
ABDOMINAL PAIN: 0
BLOOD IN STOOL: 0

## 2021-09-23 ASSESSMENT — MIFFLIN-ST. JEOR: SCORE: 1328.23

## 2021-09-23 NOTE — PROGRESS NOTES
Patient presents with:  Chest Pain: 52 yo F presents with the following  last wekend sharp pain in chest come and go having some tightness in the chest, discoloration of veins in hands ocurred a few weeks ago as well Tuesday took BP  and was higher thsn normal now back to average cramplng in fingers , toes, side of calfs and bottom of feet not consistant, pt stated she is staying hydrated       Clinical Decision Making:      ICD-10-CM    1. Other chest pain  R07.89 EKG 12-lead, tracing only     Troponin I     Comprehensive metabolic panel     CBC with platelets and differential     Left sided chest pain with new onset bradycardia on EKG and some T wave flattening. Risk factors include age and HTN. Patient was sent to the ER for further evaluation. [facility] ER was notified.    HPI:  Carlie Brumfield is a 51 year old female who presents today complaining of high blood pressure and intermittent chest pain. Her chest pain started this weekend. She had the same symptoms 5 years ago but reports that she instead had a UTI. She notes that pain being a tightness/squeezing. The pain is located under the left breast and does not radiate. The pain is off and on. No known family members who have had heart attacks, no smoking, or treatment for HLD. She is being treated for HTN. She is being treated for hypothyroidism.     She also notes increased frequency of cramping in the hands, feet, and lower legs.     History obtained from the patient.    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)  2012-08: Status post gastric bypass for obesity  2011-12: Anemia  2010-10: HYPERLIPIDEMIA LDL GOAL <130  2009-05: Mixed hyperlipemia  2009-02: Graves disease  2009-02: Hypothyroidism  2008-11: Insomnia  2006-04: Obesity  2004-09: JOINT PAIN-LOWER LEG(aka  KNEE)      Past Medical History:   Diagnosis Date     History of blood transfusion     During hysterectomy     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    Hospitalized     Thyroid disease      Unspecified urinary incontinence     Urgency incontinence -- abstracted 7/15/02       Social History     Tobacco Use     Smoking status: Never Smoker     Smokeless tobacco: Never Used   Substance Use Topics     Alcohol use: Yes     Alcohol/week: 0.0 standard drinks     Comment: rare       Vitals:    09/23/21 1828   BP: 130/68   Pulse: 57   Resp: 16   Temp: 98.2  F (36.8  C)   SpO2: 100%   Weight: 70 kg (154 lb 6.4 oz)       Physical Exam  Constitutional:       General: She is not in acute distress.     Appearance: Normal appearance.   HENT:      Mouth/Throat:      Mouth: Mucous membranes are moist.   Cardiovascular:      Rate and Rhythm: Regular rhythm. Bradycardia present.      Heart sounds: Normal heart sounds.   Pulmonary:      Effort: Pulmonary effort is normal.      Breath sounds: Normal breath sounds.   Neurological:      Mental Status: She is alert.         EKG:    Bradycardia with a regular rhythm and 1 PAC. Some T wave flattening in lead III.     At the end of the encounter, I discussed results, diagnosis, medications. Discussed red flags for immediate return to clinic/ER, as well as indications for follow up if no improvement. Patient understood and agreed to plan. Patient was stable for discharge.

## 2021-09-23 NOTE — TELEPHONE ENCOUNTER
Last weekend started having chest pains.  Has had in past- 5 years ago or so and went in and had UTI.  Pain comes and goes and not as often now.  Took bp at work 150-160/80-90 Tuesday.  Checked again last night and was normal.  Fingers and toes cramp up and cramps on bottom of feet and up side and front of leg.  Cramps have been going on for a few months.          Reason for Disposition    Chest pain(s) lasting a few seconds persists > 3 days    Additional Information    Negative: Severe difficulty breathing (e.g., struggling for each breath, speaks in single words)    Negative: Passed out (i.e., fainted, collapsed and was not responding)    Negative: Difficult to awaken or acting confused (e.g., disoriented, slurred speech)    Negative: Shock suspected (e.g., cold/pale/clammy skin, too weak to stand, low BP, rapid pulse)    Negative: Chest pain lasting longer than 5 minutes and ANY of the following:* Over 45 years old* Over 30 years old and at least one cardiac risk factor (e.g., diabetes, high blood pressure, high cholesterol, smoker, or strong family history of heart disease)* History of heart disease (i.e., angina, heart attack, heart failure, bypass surgery, takes nitroglycerin)* Pain is crushing, pressure-like, or heavy    Negative: Heart beating < 50 beats per minute OR > 140 beats per minute    Negative: Visible sweat on face or sweat dripping down face    Negative: Sounds like a life-threatening emergency to the triager    Negative: Followed an injury to chest    Negative: SEVERE chest pain    Negative: Pain also in shoulder(s) or arm(s) or jaw    Negative: Difficulty breathing    Negative: Cocaine use within last 3 days    Negative: Major surgery in the past month    Negative: Hip or leg fracture (broken bone) in past month (or had cast on leg or ankle in past month)    Negative: Illness requiring prolonged bedrest in past month (e.g., immobilization, long hospital stay)    Negative: Long-distance travel  "in past month (e.g., car, bus, train, plane; with trip lasting 6 or more hours)    Negative: History of prior 'blood clot' in leg or lungs (i.e., deep vein thrombosis, pulmonary embolism)    Negative: History of inherited increased risk of blood clots (e.g., Factor 5 Leiden, Anti-thrombin 3, Protein C or Protein S deficiency, Prothrombin mutation)    Negative: Heart beating irregularly or very rapidly    Negative: Chest pain lasting longer than 5 minutes and occurred in last 3 days (72 hours) (Exception: feels exactly the same as previously diagnosed heartburn and has accompanying sour taste in mouth)    Negative: Chest pain or 'angina' comes and goes and is happening more often (increasing in frequency) or getting worse (increasing in severity) (Exception: chest pains that last only a few seconds)    Negative: Dizziness or lightheadedness    Negative: Coughing up blood    Negative: Patient sounds very sick or weak to the triager    Negative: Cancer treatment in the past two months (or has cancer now)    Negative: Patient says chest pain feels exactly the same as previously diagnosed 'heartburn'  and  describes burning in chest and accompanying sour taste in mouth    Negative: Fever > 100.4 F (38.0 C)    Answer Assessment - Initial Assessment Questions  1. LOCATION: \"Where does it hurt?\"        (L) side under breast  2. RADIATION: \"Does the pain go anywhere else?\" (e.g., into neck, jaw, arms, back)      Neck into head weird per patient  3. ONSET: \"When did the chest pain begin?\" (Minutes, hours or days)       Last weekend  4. PATTERN \"Does the pain come and go, or has it been constant since it started?\"  \"Does it get worse with exertion?\"       Comes and goes, no  5. DURATION: \"How long does it last\" (e.g., seconds, minutes, hours)       A couple of seconds  6. SEVERITY: \"How bad is the pain?\"  (e.g., Scale 1-10; mild, moderate, or severe)     - MILD (1-3): doesn't interfere with normal activities      - MODERATE " "(4-7): interferes with normal activities or awakens from sleep     - SEVERE (8-10): excruciating pain, unable to do any normal activities        mild  7. CARDIAC RISK FACTORS: \"Do you have any history of heart problems or risk factors for heart disease?\" (e.g., angina, prior heart attack; diabetes, high blood pressure, high cholesterol, smoker, or strong family history of heart disease)      no  8. PULMONARY RISK FACTORS: \"Do you have any history of lung disease?\"  (e.g., blood clots in lung, asthma, emphysema, birth control pills)      no  9. CAUSE: \"What do you think is causing the chest pain?\"      unknown  10. OTHER SYMPTOMS: \"Do you have any other symptoms?\" (e.g., dizziness, nausea, vomiting, sweating, fever, difficulty breathing, cough)        Just her normal dizziness  11. PREGNANCY: \"Is there any chance you are pregnant?\" \"When was your last menstrual period?\"        n/a    Protocols used: CHEST PAIN-A-OH      "

## 2021-09-24 ENCOUNTER — NURSE TRIAGE (OUTPATIENT)
Dept: NURSING | Facility: CLINIC | Age: 51
End: 2021-09-24

## 2021-09-24 VITALS
OXYGEN SATURATION: 100 % | SYSTOLIC BLOOD PRESSURE: 118 MMHG | DIASTOLIC BLOOD PRESSURE: 74 MMHG | HEART RATE: 50 BPM | TEMPERATURE: 98.7 F | WEIGHT: 154 LBS | BODY MASS INDEX: 24.17 KG/M2 | RESPIRATION RATE: 18 BRPM | HEIGHT: 67 IN

## 2021-09-24 LAB
ATRIAL RATE - MUSE: 46 BPM
ATRIAL RATE - MUSE: 48 BPM
DIASTOLIC BLOOD PRESSURE - MUSE: NORMAL MMHG
DIASTOLIC BLOOD PRESSURE - MUSE: NORMAL MMHG
INTERPRETATION ECG - MUSE: NORMAL
INTERPRETATION ECG - MUSE: NORMAL
P AXIS - MUSE: -16 DEGREES
P AXIS - MUSE: 53 DEGREES
PR INTERVAL - MUSE: 136 MS
PR INTERVAL - MUSE: 144 MS
QRS DURATION - MUSE: 92 MS
QRS DURATION - MUSE: 94 MS
QT - MUSE: 470 MS
QT - MUSE: 494 MS
QTC - MUSE: 411 MS
QTC - MUSE: 441 MS
R AXIS - MUSE: 39 DEGREES
R AXIS - MUSE: 44 DEGREES
SYSTOLIC BLOOD PRESSURE - MUSE: NORMAL MMHG
SYSTOLIC BLOOD PRESSURE - MUSE: NORMAL MMHG
T AXIS - MUSE: 34 DEGREES
T AXIS - MUSE: 41 DEGREES
VENTRICULAR RATE- MUSE: 46 BPM
VENTRICULAR RATE- MUSE: 48 BPM

## 2021-09-24 NOTE — ED PROVIDER NOTES
History   Chief Complaint:  Chest Pain       HPI   Carlie Brumfield is a 51 year old female with a history of Raynaud's who presents with a non-radiating intermittent sharp pain under her left breast that started about 6 days ago. She describes this as brief and random. No aggravating factors such as exertion, coughing, or deep breaths. Denies fevers, chills, nausea/vomiting, shortness of breath, abdominal pain, or black/bloody stools. No personal history of cancer or blood clots. She is not on hormones or birth control. Occasional alcohol use, not daily, but no drug use. She is Covid vaccinated.     Review of Systems   Constitutional: Negative for chills and fever.   Respiratory: Negative for shortness of breath.    Cardiovascular: Positive for chest pain.   Gastrointestinal: Negative for abdominal pain, blood in stool, nausea and vomiting.   All other systems reviewed and are negative.    Allergies:  The patient has no known allergies.     Medications:  Synthroid  Cyanocobalamin injections    Past Medical History:    Blood transfusion  Viral meningitis  Thyroid disease  Anemia  Graves disease    Past Surgical History:    Appendectomy  Breast biopsy  Colonoscopy  EGD combined x2  Bariatric surgery  Hysterectomy  Orthopedic surgery   sections x2  Left thumb tendon repair    Family History:    Mother: Osteoporosis    Social History:  Patient presents to the ED with .    Physical Exam     Patient Vitals for the past 24 hrs:   BP Temp Temp src Pulse Resp SpO2 Height Weight   21 0030 -- -- -- 50 -- 100 % -- --   21 0025 -- -- -- (!) 46 -- 100 % -- --   21 0020 118/74 -- -- (!) 45 -- 100 % -- --   21 2345 135/89 -- -- (!) 45 -- 100 % -- --   21 2338 -- -- -- (!) 49 -- 100 % -- --   21 2300 -- -- -- (!) 46 -- 100 % -- --   21 2245 120/71 -- -- 53 -- 100 % -- --   21 2230 127/76 -- -- 53 -- 100 % -- --   21 2215 136/81 -- -- 57 18 100 % -- --   21  "2051 (!) 151/86 98.7  F (37.1  C) Oral 59 18 100 % 1.689 m (5' 6.5\") 69.9 kg (154 lb)       Physical Exam  Nursing note and vitals reviewed.  Constitutional: Well nourished.   Eyes: Conjunctiva normal.  Pupils are equal, round, and reactive to light.   ENT: Nose normal. Mucous membranes pink and moist.    Neck: Normal range of motion.  CVS: Normal rate, regular rhythm.  Normal heart sounds.  No murmur.  Pulmonary: Lungs clear to auscultation bilaterally. No wheezes/rales/rhonchi.  GI: Abdomen soft. Nontender, nondistended. No rigidity or guarding.    MSK: No calf tenderness or swelling.  Neuro: Alert. Follows simple commands.  Skin: Skin is warm and dry. No rash noted.   Psychiatric: Normal affect.     Emergency Department Course   ECG  ECG taken at 2108, ECG read at 2110  Sinus bradycardia with sinus arrhythmia. Otherwise normal ECG.   Rate 46 bpm. NC interval 136 ms. QRS duration 92 ms. QT/QTc 470/411 ms. P-R-T axes -16 44 34.     ECG #2  ECG taken at 0006, ECG read at 0007  Sinus bradycardia. Otherwise normal ECG.   Rate 48 bpm. NC interval 144 ms. QRS duration 94 ms. QT/QTc 494/441 ms. P-R-T axes 53 39 41.     Imaging:  CT Chest w/ IV contrast - PE protocol:   No pulmonary embolus or other acute process in the chest. As per radiology.     Laboratory:  CBC: WBC: 5.9, HGB: 11.1 (L), PLT: 247    BMP: Glucose 81 o/w WNL (Creatinine: 0.76)    Troponin (Collected 2127): <0.015    D-dimer: 0.54 (H)    Emergency Department Course:    Reviewed:  I reviewed nursing notes, vitals, past medical history and care everywhere    Assessments:  2223 I obtained history and examined the patient as noted above.     0003 I rechecked the patient and explained findings.     Disposition:  The patient was discharged to home.     Impression & Plan   HEART Score:    Predicts Major Adverse Cardiac Events within 6 weeks:    History:   Highly suspicious:  2   Moderately suspicious: 1   Slightly/Non-suspicious: 0  ECG:   Significant ST " depression 2   Nonspecific repolarization 1   Normal    0  Age:    >=65    2   >45-<65   1   <= 45    0  Risk Factors [DM, Current or recent smoker, HTN, HLP, FMH CAD, Obesity]   >=3 or Hx. CAD  2   1-2    1   None    0  Troponin:   >= 3x normal   2   >1 to <3x normal  1   Normal    0    This Patient's Score:   1    Interpretation:    0-3:    2.5% risk of MACE (may consider D/C)   4-6:    20.3% (Observation)   7-10:    72.7% (Admit, consider early invasive strategy)        Medical Decision Making:  Carlie Brumfield is a 51 year old female presenting with reported chest pain. She is non-toxic, clinically well hydrated on arrival, and no significant distress. EKG without ischemic changes, AV block, Brugada or WPW. Delta troponin negative. She has a low HEART score and clinically I have a lower suspicion for ACS. D-dimer elevated so she did undergo formal CT which is fortunately negative for PE, pneumonia, fluid overload, or widened mediastinum. Labs overall reassuring without evidence to suggest underlying sepsis or profound anemia. No reproducible abdominal pain and I doubt referred source of her pain.  At this point in time I discussed with the patient, I am unsure of the etiology of her pain though I do feel she would benefit from close outpatient follow-up and potential outpatient stress test. She declined wanting analgesia during her time in the ED though is agreeable to close outpatient follow-up. Return precautions given.      Diagnosis:    ICD-10-CM    1. Chest pain, unspecified type  R07.9 Primary Care Referral       Scribe Disclosure:  I, Olu Doe, am serving as a scribe at 10:09 PM on 9/23/2021 to document services personally performed by Rita Aguilar DO based on my observations and the provider's statements to me.              Rita Aguilar DO  09/24/21 [id number]

## 2021-09-24 NOTE — TELEPHONE ENCOUNTER
Triage call:   Needs a new PCP  Was in ER yesterday- needs a follow up- states that her symptoms are   Also needs to have medications refilled: Amlodipine- hasn't taken for about 2 months now  Declines additional triage at this time.   States that since her PCP left she doesn't know who to see- no appointments at [facility] till October. Advised she can be seen at a different clinic as well- agrees to do so.     Leda Hyman RN BSN 9/24/2021 11:29 AM  COVID 19 Nurse Triage Plan/Patient Instructions    Please be aware that novel coronavirus (COVID-19) may be circulating in the community. If you develop symptoms such as fever, cough, or SOB or if you have concerns about the presence of another infection including coronavirus (COVID-19), please contact your health care provider or visit [email or web address].     Disposition/Instructions    In-Person Visit with provider recommended. Reference Visit Selection Guide.    Thank you for taking steps to prevent the spread of this virus.  o Limit your contact with others.  o Wear a simple mask to cover your cough.  o Wash your hands well and often.    Resources    [facility]: About COVID-19: [email or web address]    CDC: What to Do If You're Sick: www.cdc.gov/coronavirus/2019-ncov/about/steps-when-sick.html    CDC: Ending Home Isolation: www.cdc.gov/coronavirus/2019-ncov/hcp/disposition-in-home-patients.html     CDC: Caring for Someone: www.cdc.gov/coronavirus/2019-ncov/if-you-are-sick/care-for-someone.html     [facility]: Interim Guidance for Hospital Discharge to Home: www.health.[facility].mn.us/diseases/coronavirus/hcp/hospdischarge.pdf    [facility] clinical trials (COVID-19 research studies): [email or web address].[facility]/umn-clinical-trials     Below are the COVID-19 hotlines at the [facility] ([facility]). Interpreters are available.   o For health questions: Call 269-302-8497 or 1-806.533.9886 (7 a.m. to 7 p.m.)  o For questions  about schools and childcare: Call 445-625-8534 or 1-643.306.6685 (7 a.m. to 7 p.m.)                  Reason for Disposition    Caller requesting a NON-URGENT new prescription or refill and triager unable to refill per department policy     Needs new PCP    Additional Information    Negative: Drug overdose and triager unable to answer question    Negative: Caller requesting information unrelated to medicine    Negative: Caller requesting a prescription for Strep throat and has a positive culture result    Negative: Rash while taking a medication or within 3 days of stopping it    Negative: Immunization reaction suspected    Negative: Asthma and having symptoms of asthma (cough, wheezing, etc.)    Negative: Breastfeeding questions about mother's medicines and diet    Negative: MORE THAN A DOUBLE DOSE of a prescription or over-the-counter (OTC) drug    Negative: DOUBLE DOSE (an extra dose or lesser amount) of over-the-counter (OTC) drug and any symptoms (e.g., dizziness, nausea, pain, sleepiness)    Negative: DOUBLE DOSE (an extra dose or lesser amount) of prescription drug and any symptoms (e.g., dizziness, nausea, pain, sleepiness)    Negative: Took another person's prescription drug    Negative: DOUBLE DOSE (an extra dose or lesser amount) of prescription drug and NO symptoms (Exception: a double dose of antibiotics)    Negative: Diabetes drug error or overdose (e.g., took wrong type of insulin or took extra dose)    Negative: Caller has medication question about med not prescribed by PCP and triager unable to answer question (e.g., compatibility with other med, storage)    Negative: Request for URGENT new prescription or refill of 'essential' medication (i.e., likelihood of harm to patient if not taken) and triager unable to fill per department policy    Negative: Prescription not at pharmacy and was prescribed today by PCP    Negative: Pharmacy calling with prescription questions and triager unable to answer  question    Negative: Caller has urgent medication question about med that PCP prescribed and triager unable to answer question    Negative: Caller has NON-URGENT medication question about med that PCP prescribed and triager unable to answer question    Protocols used: MEDICATION QUESTION CALL-A-[place]

## 2021-09-24 NOTE — ED TRIAGE NOTES
Pt presents for evaluation of left sided chest pain that has been intermittent since last weekend. Pain persisted throughout the week. Checked BP early in the week and noted higher than normal BPs. Went to  today and sent here for eval. Had an EKG done PTA, which showed bradycardia, PACs and non-specific T wave changes. Also notes cramping in legs and hands for a couple months.

## 2021-09-27 ENCOUNTER — OFFICE VISIT (OUTPATIENT)
Dept: FAMILY MEDICINE | Facility: CLINIC | Age: 51
End: 2021-09-27
Payer: COMMERCIAL

## 2021-09-27 VITALS
SYSTOLIC BLOOD PRESSURE: 102 MMHG | DIASTOLIC BLOOD PRESSURE: 62 MMHG | BODY MASS INDEX: 25.17 KG/M2 | TEMPERATURE: 98.1 F | WEIGHT: 156.6 LBS | RESPIRATION RATE: 16 BRPM | HEIGHT: 66 IN | HEART RATE: 58 BPM

## 2021-09-27 DIAGNOSIS — R07.9 CHEST PAIN, UNSPECIFIED TYPE: ICD-10-CM

## 2021-09-27 DIAGNOSIS — I73.00 RAYNAUD'S DISEASE WITHOUT GANGRENE: ICD-10-CM

## 2021-09-27 DIAGNOSIS — Z00.00 PREVENTATIVE HEALTH CARE: ICD-10-CM

## 2021-09-27 DIAGNOSIS — E05.00 GRAVES DISEASE: ICD-10-CM

## 2021-09-27 DIAGNOSIS — M79.10 MYALGIA: ICD-10-CM

## 2021-09-27 DIAGNOSIS — E03.9 HYPOTHYROIDISM, UNSPECIFIED TYPE: ICD-10-CM

## 2021-09-27 LAB
ALBUMIN SERPL-MCNC: 3.6 G/DL (ref 3.4–5)
ALP SERPL-CCNC: 94 U/L (ref 40–150)
ALT SERPL W P-5'-P-CCNC: 20 U/L (ref 0–50)
AST SERPL W P-5'-P-CCNC: 18 U/L (ref 0–45)
BILIRUB DIRECT SERPL-MCNC: <0.1 MG/DL (ref 0–0.2)
BILIRUB SERPL-MCNC: 0.3 MG/DL (ref 0.2–1.3)
CHOLEST SERPL-MCNC: 221 MG/DL
CRP SERPL-MCNC: <2.9 MG/L (ref 0–8)
D DIMER PPP FEU-MCNC: 0.37 UG/ML FEU (ref 0–0.5)
ERYTHROCYTE [SEDIMENTATION RATE] IN BLOOD BY WESTERGREN METHOD: 9 MM/HR (ref 0–30)
FASTING STATUS PATIENT QL REPORTED: YES
HDLC SERPL-MCNC: 70 MG/DL
LDLC SERPL CALC-MCNC: 135 MG/DL
NONHDLC SERPL-MCNC: 151 MG/DL
PROT SERPL-MCNC: 7.2 G/DL (ref 6.8–8.8)
TRIGL SERPL-MCNC: 81 MG/DL
TSH SERPL DL<=0.005 MIU/L-ACNC: 0.42 MU/L (ref 0.4–4)
TSH SERPL DL<=0.005 MIU/L-ACNC: 0.44 MU/L (ref 0.4–4)

## 2021-09-27 PROCEDURE — 80076 HEPATIC FUNCTION PANEL: CPT | Performed by: FAMILY MEDICINE

## 2021-09-27 PROCEDURE — 86038 ANTINUCLEAR ANTIBODIES: CPT | Performed by: FAMILY MEDICINE

## 2021-09-27 PROCEDURE — 80061 LIPID PANEL: CPT | Performed by: FAMILY MEDICINE

## 2021-09-27 PROCEDURE — 99214 OFFICE O/P EST MOD 30 MIN: CPT | Performed by: FAMILY MEDICINE

## 2021-09-27 PROCEDURE — 85652 RBC SED RATE AUTOMATED: CPT | Performed by: FAMILY MEDICINE

## 2021-09-27 PROCEDURE — 86200 CCP ANTIBODY: CPT | Performed by: FAMILY MEDICINE

## 2021-09-27 PROCEDURE — 86140 C-REACTIVE PROTEIN: CPT | Performed by: FAMILY MEDICINE

## 2021-09-27 PROCEDURE — 36415 COLL VENOUS BLD VENIPUNCTURE: CPT | Performed by: FAMILY MEDICINE

## 2021-09-27 PROCEDURE — 84443 ASSAY THYROID STIM HORMONE: CPT | Performed by: FAMILY MEDICINE

## 2021-09-27 PROCEDURE — 86431 RHEUMATOID FACTOR QUANT: CPT | Performed by: FAMILY MEDICINE

## 2021-09-27 PROCEDURE — 85379 FIBRIN DEGRADATION QUANT: CPT | Performed by: FAMILY MEDICINE

## 2021-09-27 ASSESSMENT — MIFFLIN-ST. JEOR: SCORE: 1332.08

## 2021-09-27 NOTE — PATIENT INSTRUCTIONS
I will review your studies via [employer]. We can consider restarting the amlodipine treatment if your is a little higher consistently.       Patient Education     Uncertain Causes of Chest Pain    Chest pain can happen for a number of reasons. Sometimes the cause can't be determined. If your condition does not seem serious, and your pain does not appear to be coming from your heart, your healthcare provider may recommend watching it closely. Sometimes the signs of a serious problem take more time to appear. Many problems not related to your heart can cause chest pain. These include:    Musculoskeletal. Costochondritis is an inflammation of the tissues around the ribs that can occur from trauma or overuse injuries, or a strain of the muscles of the chest wall    Respiratory. Pneumonia, collapsed lung (pneumothorax), or inflammation of the lining of the chest and lungs (pleurisy)    Gastrointestinal. Esophageal reflux, heartburn, ulcers, or gallbladder disease    Anxiety and panic disorders    Nerve compression and inflammation    Rare miscellaneous problems such as aortic aneurysm (a swelling of the large artery coming out of the heart) or pulmonary embolism (a blood clot in the lungs)  Home care  After your visit, follow these recommendations:    Rest today and avoid strenuous activity.    Take any prescribed medicine as directed.    Be aware of any recurrent chest pain and notice any changes  Follow-up care  Follow up with your healthcare provider if you do not start to feel better within 24 hours, or as advised.  Call 911  Call 911 if any of these occur:    A change in the type of pain: if it feels different, becomes more severe, lasts longer, or begins to spread into your shoulder, arm, neck, jaw or back    Shortness of breath or increased pain with breathing    Weakness, dizziness, or fainting    Rapid heart beat    Crushing sensation in your chest  When to seek medical advice  Call your healthcare provider right  away if any of the following occur:    Cough with dark colored sputum (phlegm) or blood    Fever of 100.4 F (38 C) or higher, or as directed by your healthcare provider    Swelling, pain or redness in one leg  Ajay last reviewed this educational content on 5/1/2018 2000-2021 [employer]. All rights reserved. This information is not intended as a substitute for professional medical care. Always follow your healthcare professional's instructions.

## 2021-09-27 NOTE — PROGRESS NOTES
"    Assessment & Plan   See after visit summary and result note from studies for helpful information and advice given to patient.    Chest pain, unspecified type  No clear etiology on exam today, or from recent ER evaluation.  - D dimer, quantitative  - Hepatic panel (Albumin, ALT, AST, Bili, Alk Phos, TP)  - Anti Nuclear Isabel IgG by IFA with Reflex    Raynaud's disease without gangrene    - Anti Nuclear Isabel IgG by IFA with Reflex    Myalgia    - ESR: Erythrocyte sedimentation rate  - CRP, inflammation  - Rheumatoid factor  - HLA-B27 Typing  - Cyclic Citrullinated Peptide Antibody IgG  - VITAMIN B-12  - Anti Nuclear Isabel IgG by IFA with Reflex    Hypothyroidism, unspecified type    - TSH with free T4 reflex    Preventative health care    - Lipid panel reflex to direct LDL Fasting    Graves disease    - **TSH with free T4 reflex FUTURE anytime               BMI:   Estimated body mass index is 25.28 kg/m  as calculated from the following:    Height as of this encounter: 1.676 m (5' 6\").    Weight as of this encounter: 71 kg (156 lb 9.6 oz).           Return in about 3 months (around 12/27/2021) for Routine preventive.    Quinn Stern DO  [facility] eSxton is a 51 year old who presents for the following health issues     History of Present Illness       Vascular Disease:  She presents for follow up of vascular disease.  She never takes nitroglycerin. She is not taking daily aspirin.    She eats 2-3 servings of fruits and vegetables daily.She consumes 0 sweetened beverage(s) daily.She exercises with enough effort to increase her heart rate 30 to 60 minutes per day.  She exercises with enough effort to increase her heart rate 3 or less days per week. She is missing 2 dose(s) of medications per week.  She is not taking prescribed medications regularly due to remembering to take.         Hospital Follow-up Visit:    Hospital/Nursing Home/ Rehab Facility: [facility] " [facility]Hospital  Date of Admission: 09/23/2021  Date of Discharge: 09/23/2021  Reason(s) for Admission: Chest pain      Was your hospitalization related to COVID-19? No   Problems taking medications regularly:  None  Medication changes since discharge: None  Problems adhering to non-medication therapy:  None    Summary of hospitalization:  [facility] hospital discharge summary reviewed  Diagnostic Tests/Treatments reviewed.  Follow up needed: With primary care clinic.  Other Healthcare Providers Involved in Patient s Care:         None  Update since discharge: fluctuating course.       Post Discharge Medication Reconciliation: Medication list reconciled.  The amlodipine is not going to be taken until her blood pressure is consistently a little higher at least from what it is at time of exam.  We will recheck her vitamin B 12 level.  Plan of care communicated with patient                  Review of Systems   Patient is seen for follow-up management in hospital for chest discomfort.    Her chest pain is on/off, no more then an hour yesterday, but most of the day 2 days ago.    Today at exam, she has a mild stabbing pain under left breast.     No shortness of breath.     No cough.     She has had more frequent \"hira horse\" pain in RLL the past couple week, and her fingers will contract over the same period with an \"ache\".     No change in chest pain with activity, diet, or breathing.     Patient told me her blood pressure at work has been in the 160s/90s last week near periods of chest discomfort.         Objective    /62 (BP Location: Right arm, Patient Position: Sitting, Cuff Size: Adult Regular)   Pulse 58   Temp 98.1  F (36.7  C) (Oral)   Resp 16   Ht 1.676 m (5' 6\")   Wt 71 kg (156 lb 9.6 oz)   LMP 06/02/2011   BMI 25.28 kg/m    Body mass index is 25.28 kg/m .  Physical Exam   Vital signs reviewed.  Patient is in no acute appearing distress.  Breathing appears nonlabored.  Patient is alert and " oriented ×3.  Patient is very pleasant, making good eye contact and responding with clear fluent speech.  No conversational dyspnea.  Coughing during exam.    Heart: Heart rate is regular without murmur.    Lungs: Lungs are clear to auscultation with good airflow bilaterally.    Back: No areas of tenderness.    Abdomen:  Abdomen is soft, nontender.  No palpable abnormal masses or organomegaly.  Bowel sounds are normal.    Skin/extremities: Warm and dry, with no lower leg edema.  No tenderness in bilateral calves.  Patient can abduct at the shoulders normally 180 degrees, and do Apley scratch test without any associated chest discomfort.    Results for orders placed or performed in visit on 09/27/21   ESR: Erythrocyte sedimentation rate     Status: Normal   Result Value Ref Range    Erythrocyte Sedimentation Rate 9 0 - 30 mm/hr   CRP, inflammation     Status: Normal   Result Value Ref Range    CRP Inflammation <2.9 0.0 - 8.0 mg/L   TSH with free T4 reflex     Status: Normal   Result Value Ref Range    TSH 0.42 0.40 - 4.00 mU/L   D dimer, quantitative     Status: Normal   Result Value Ref Range    D-Dimer Quantitative 0.37 0.00 - 0.50 ug/mL FEU    Narrative    This D-dimer assay is intended for use in conjunction with a clinical pretest probability assessment model to exclude pulmonary embolism (PE) and deep venous thrombosis (DVT) in outpatients suspected of PE or DVT. The cut-off value is 0.50 ug/mL FEU.   Lipid panel reflex to direct LDL Fasting     Status: Abnormal   Result Value Ref Range    Cholesterol 221 (H) <200 mg/dL    Triglycerides 81 <150 mg/dL    Direct Measure HDL 70 >=50 mg/dL    LDL Cholesterol Calculated 135 (H) <=100 mg/dL    Non HDL Cholesterol 151 (H) <130 mg/dL    Patient Fasting > 8hrs? Yes     Narrative    Cholesterol  Desirable:  <200 mg/dL    Triglycerides  Normal:  Less than 150 mg/dL  Borderline High:  150-199 mg/dL  High:  200-499 mg/dL  Very High:  Greater than or equal to 500  mg/dL    Direct Measure HDL  Female:  Greater than or equal to 50 mg/dL   Male:  Greater than or equal to 40 mg/dL    LDL Cholesterol  Desirable:  <100mg/dL  Above Desirable:  100-129 mg/dL   Borderline High:  130-159 mg/dL   High:  160-189 mg/dL   Very High:  >= 190 mg/dL    Non HDL Cholesterol  Desirable:  130 mg/dL  Above Desirable:  130-159 mg/dL  Borderline High:  160-189 mg/dL  High:  190-219 mg/dL  Very High:  Greater than or equal to 220 mg/dL   Hepatic panel (Albumin, ALT, AST, Bili, Alk Phos, TP)     Status: Normal   Result Value Ref Range    Bilirubin Total 0.3 0.2 - 1.3 mg/dL    Bilirubin Direct <0.1 0.0 - 0.2 mg/dL    Protein Total 7.2 6.8 - 8.8 g/dL    Albumin 3.6 3.4 - 5.0 g/dL    Alkaline Phosphatase 94 40 - 150 U/L    AST 18 0 - 45 U/L    ALT 20 0 - 50 U/L   **TSH with free T4 reflex FUTURE anytime     Status: Normal   Result Value Ref Range    TSH 0.44 0.40 - 4.00 mU/L   Lab results not available for review at time of exam.  Please see result note.

## 2021-09-28 LAB
CCP AB SER IA-ACNC: 1.8 U/ML
RHEUMATOID FACT SER NEPH-ACNC: 10 IU/ML

## 2021-09-29 LAB — ANA SER QL IF: NEGATIVE

## 2022-01-12 ENCOUNTER — NURSE TRIAGE (OUTPATIENT)
Dept: INTERNAL MEDICINE | Facility: CLINIC | Age: 52
End: 2022-01-12
Payer: COMMERCIAL

## 2022-01-12 NOTE — TELEPHONE ENCOUNTER
Patient tested for COVID-19 on 12/30 and was seen on 12/31 at [facility] for cough and UTI.  Patient was given antibiotic and Z-Pack.  Patient is calling because she has finished all medication and states she is worse than before if anything.  Please address and advise.

## 2022-01-12 NOTE — TELEPHONE ENCOUNTER
S-(situation): Patient called with continuing cough.    B-(background): Patient diagnosed with Covid on 12/30/21. UTI and Pneumonia on 12/31. Given Omnicef and decadron.     A-(assessment): Patient calls with worsening cough. Omnicef completed on 1/9/22. Cough is dry and hacking. Worsens with talking or movement. Patient is fatigued. Patient is able to sleep and able to eat. Denies fevers or headache. SOB when coughing but fine at rest.     R-(recommendations): Patient advised of cough home care advice. Recommended to stay hydrated, use cough medicine and rest. Appointment made.   Appointments in Next Year    Jan 13, 2022 11:00 AM  (Arrive by 10:40 AM)  Provider Visit with MICHALELE Flores CNP  [facility] ([facility] ) [phone]            Reason for Disposition    [1] Typical COVID-19 symptoms AND [2] lasting less than 3 weeks    [1] Continuous (nonstop) coughing interferes with work or school AND [2] no improvement using cough treatment per protocol    Additional Information    Negative: SEVERE difficulty breathing (e.g., struggling for each breath, speaks in single words)    Negative: [1] SEVERE weakness (e.g., can't stand or can barely walk) AND [2] new-onset or WORSE    Negative: Difficult to awaken or acting confused (e.g., disoriented, slurred speech)    Negative: Bluish (or gray) lips or face now    Negative: Sounds like a life-threatening emergency to the triager    Negative: [1] Fever AND [2] new-onset or worsening    Negative: [1] Chest pain, pressure, or tightness AND [2] new-onset or worsening    Negative: SEVERE difficulty breathing (e.g., struggling for each breath, speaks in single words)    Negative: Difficult to awaken or acting confused (e.g., disoriented, slurred speech)    Negative: Bluish (or gray) lips or face now    Negative: Shock suspected (e.g., cold/pale/clammy skin, too weak to stand, low BP, rapid pulse)    Negative: Sounds  like a life-threatening emergency to the triager    Negative: [1] COVID-19 exposure AND [2] no symptoms    Negative: COVID-19 vaccine reaction suspected (e.g., fever, headache, muscle aches) occurring 1 to 3 days after getting vaccine    Negative: COVID-19 vaccine, questions about    Negative: [1] Lives with someone known to have influenza (flu test positive) AND [2] flu-like symptoms (e.g., cough, runny nose, sore throat, SOB; with or without fever)    Negative: [1] Adult with possible COVID-19 symptoms AND [2] triager concerned about severity of symptoms or other causes    Negative: COVID-19 and breastfeeding, questions about    Negative: SEVERE or constant chest pain or pressure (Exception: mild central chest pain, present only when coughing)    Negative: MODERATE difficulty breathing (e.g., speaks in phrases, SOB even at rest, pulse 100-120)    Negative: [1] Headache AND [2] stiff neck (can't touch chin to chest)    Negative: MILD difficulty breathing (e.g., minimal/no SOB at rest, SOB with walking, pulse <100)    Negative: Chest pain or pressure    Negative: Patient sounds very sick or weak to the triager    Negative: Fever > 103 F (39.4 C)    Negative: [1] Fever > 101 F (38.3 C) AND [2] age > 60 years    Negative: [1] Fever > 100.0 F (37.8 C) AND [2] bedridden (e.g., nursing home patient, CVA, chronic illness, recovering from surgery)    Negative: HIGH RISK for severe COVID complications (e.g., age > 64 years, obesity with BMI > 25, pregnant, chronic lung disease or other chronic medical condition)  (Exception: Already seen by PCP and no new or worsening symptoms.)    Negative: [1] HIGH RISK patient AND [2] influenza is widespread in the community AND [3] ONE OR MORE respiratory symptoms: cough, sore throat, runny or stuffy nose    Negative: [1] HIGH RISK patient AND [2] influenza exposure within the last 7 days AND [3] ONE OR MORE respiratory symptoms: cough, sore throat, runny or stuffy nose    Negative:  Cough present > 3 weeks    Negative: [1] COVID-19 infection suspected by caller or triager AND [2] mild symptoms (cough, fever, or others) AND [3] negative COVID-19 rapid test    Negative: Fever present > 3 days (72 hours)    Negative: [1] Fever returns after gone for over 24 hours AND [2] symptoms worse or not improved    Protocols used: CORONAVIRUS (COVID-19) PERSISTING SYMPTOMS FOLLOW-UP CALL-[facility] 8.25.2021, CORONAVIRUS (COVID-19) DIAGNOSED OR [id number] 8.25.2021

## 2022-03-05 ENCOUNTER — HEALTH MAINTENANCE LETTER (OUTPATIENT)
Age: 52
End: 2022-03-05

## 2022-04-30 ENCOUNTER — HEALTH MAINTENANCE LETTER (OUTPATIENT)
Age: 52
End: 2022-04-30

## 2022-05-31 DIAGNOSIS — E05.00 GRAVES DISEASE: ICD-10-CM

## 2022-05-31 RX ORDER — LEVOTHYROXINE SODIUM 150 UG/1
150 TABLET ORAL DAILY
Qty: 90 TABLET | Refills: 0 | Status: SHIPPED | OUTPATIENT
Start: 2022-05-31 | End: 2022-09-26

## 2022-05-31 NOTE — TELEPHONE ENCOUNTER
Medication is being filled for 1 time refill only due to:  Future appointment scheduled   Appointments in Next Year    Jun 22, 2022  9:00 AM  (Arrive by 8:40 AM)  Provider Visit with Yael Rodgers MD  [facility] ([facility] ) [phone]         Ayleen Bryant RN  [facility]

## 2022-05-31 NOTE — TELEPHONE ENCOUNTER
Reason for Call:  Medication or medication refill:levothyroxine (SYNTHROID/LEVOTHROID) 150 MCG tablet    Do you use a [facility]?  Name of the pharmacy and phone number for the current request:  [facility] #[id number] - [place], [place] - [address]    Name of the medication requested: levothyroxine (SYNTHROID/LEVOTHROID) 150 MCG tablet    Other request: please call patient to advise    Can we leave a detailed message on this number? YES    Phone number patient can be reached at: Home number on file [phone] (home)    Best Time: anytime    Call taken on 5/31/2022 at 9:47 AM by Danyell Jacobsen

## 2022-06-22 ENCOUNTER — OFFICE VISIT (OUTPATIENT)
Dept: INTERNAL MEDICINE | Facility: CLINIC | Age: 52
End: 2022-06-22
Payer: COMMERCIAL

## 2022-06-22 VITALS
OXYGEN SATURATION: 100 % | DIASTOLIC BLOOD PRESSURE: 75 MMHG | WEIGHT: 164.6 LBS | HEART RATE: 56 BPM | BODY MASS INDEX: 26.45 KG/M2 | TEMPERATURE: 97.6 F | SYSTOLIC BLOOD PRESSURE: 109 MMHG | HEIGHT: 66 IN | RESPIRATION RATE: 16 BRPM

## 2022-06-22 DIAGNOSIS — E03.9 HYPOTHYROIDISM, UNSPECIFIED TYPE: ICD-10-CM

## 2022-06-22 DIAGNOSIS — R42 DIZZINESS: ICD-10-CM

## 2022-06-22 DIAGNOSIS — R27.9 INCOORDINATION: ICD-10-CM

## 2022-06-22 DIAGNOSIS — D50.8 OTHER IRON DEFICIENCY ANEMIA: ICD-10-CM

## 2022-06-22 DIAGNOSIS — M54.41 MIDLINE LOW BACK PAIN WITH RIGHT-SIDED SCIATICA, UNSPECIFIED CHRONICITY: Primary | ICD-10-CM

## 2022-06-22 DIAGNOSIS — M62.838 MUSCLE SPASM OF RIGHT LOWER EXTREMITY: ICD-10-CM

## 2022-06-22 DIAGNOSIS — E05.00 GRAVES DISEASE: ICD-10-CM

## 2022-06-22 LAB
BASOPHILS # BLD AUTO: 0.1 10E3/UL (ref 0–0.2)
BASOPHILS NFR BLD AUTO: 1 %
EOSINOPHIL # BLD AUTO: 0.2 10E3/UL (ref 0–0.7)
EOSINOPHIL NFR BLD AUTO: 4 %
ERYTHROCYTE [DISTWIDTH] IN BLOOD BY AUTOMATED COUNT: 18.4 % (ref 10–15)
HCT VFR BLD AUTO: 34.9 % (ref 35–47)
HGB BLD-MCNC: 10.8 G/DL (ref 11.7–15.7)
IMM GRANULOCYTES # BLD: 0 10E3/UL
IMM GRANULOCYTES NFR BLD: 0 %
LYMPHOCYTES # BLD AUTO: 1.6 10E3/UL (ref 0.8–5.3)
LYMPHOCYTES NFR BLD AUTO: 31 %
MCH RBC QN AUTO: 23.2 PG (ref 26.5–33)
MCHC RBC AUTO-ENTMCNC: 30.9 G/DL (ref 31.5–36.5)
MCV RBC AUTO: 75 FL (ref 78–100)
MONOCYTES # BLD AUTO: 0.4 10E3/UL (ref 0–1.3)
MONOCYTES NFR BLD AUTO: 8 %
NEUTROPHILS # BLD AUTO: 2.9 10E3/UL (ref 1.6–8.3)
NEUTROPHILS NFR BLD AUTO: 56 %
PLATELET # BLD AUTO: 229 10E3/UL (ref 150–450)
RBC # BLD AUTO: 4.65 10E6/UL (ref 3.8–5.2)
WBC # BLD AUTO: 5.2 10E3/UL (ref 4–11)

## 2022-06-22 PROCEDURE — 84443 ASSAY THYROID STIM HORMONE: CPT | Performed by: INTERNAL MEDICINE

## 2022-06-22 PROCEDURE — 36415 COLL VENOUS BLD VENIPUNCTURE: CPT | Performed by: INTERNAL MEDICINE

## 2022-06-22 PROCEDURE — 99214 OFFICE O/P EST MOD 30 MIN: CPT | Performed by: INTERNAL MEDICINE

## 2022-06-22 PROCEDURE — 85025 COMPLETE CBC W/AUTO DIFF WBC: CPT | Performed by: INTERNAL MEDICINE

## 2022-06-22 ASSESSMENT — PATIENT HEALTH QUESTIONNAIRE - PHQ9
10. IF YOU CHECKED OFF ANY PROBLEMS, HOW DIFFICULT HAVE THESE PROBLEMS MADE IT FOR YOU TO DO YOUR WORK, TAKE CARE OF THINGS AT HOME, OR GET ALONG WITH OTHER PEOPLE: NOT DIFFICULT AT ALL
SUM OF ALL RESPONSES TO PHQ QUESTIONS 1-9: 2
SUM OF ALL RESPONSES TO PHQ QUESTIONS 1-9: 2

## 2022-06-22 ASSESSMENT — ANXIETY QUESTIONNAIRES
6. BECOMING EASILY ANNOYED OR IRRITABLE: NOT AT ALL
2. NOT BEING ABLE TO STOP OR CONTROL WORRYING: NOT AT ALL
GAD7 TOTAL SCORE: 1
1. FEELING NERVOUS, ANXIOUS, OR ON EDGE: NOT AT ALL
3. WORRYING TOO MUCH ABOUT DIFFERENT THINGS: SEVERAL DAYS
7. FEELING AFRAID AS IF SOMETHING AWFUL MIGHT HAPPEN: NOT AT ALL
5. BEING SO RESTLESS THAT IT IS HARD TO SIT STILL: NOT AT ALL
8. IF YOU CHECKED OFF ANY PROBLEMS, HOW DIFFICULT HAVE THESE MADE IT FOR YOU TO DO YOUR WORK, TAKE CARE OF THINGS AT HOME, OR GET ALONG WITH OTHER PEOPLE?: NOT DIFFICULT AT ALL
7. FEELING AFRAID AS IF SOMETHING AWFUL MIGHT HAPPEN: NOT AT ALL
GAD7 TOTAL SCORE: 1
GAD7 TOTAL SCORE: 1
4. TROUBLE RELAXING: NOT AT ALL

## 2022-06-22 NOTE — PROGRESS NOTES
"  Assessment & Plan     Midline low back pain with right-sided sciatica, unspecified chronicity     - MR Lumbar Spine w/o Contrast; Future    Graves disease       Hypothyroidism, unspecified type     - TSH with free T4 reflex; Future  - TSH with free T4 reflex    Other iron deficiency anemia     - CBC with platelets and differential; Future  - CBC with platelets and differential  - Ferritin; Future  - Iron and iron binding capacity; Future    Incoordination     - Adult Neurology  Referral; Future  - MR Brain w/o & w Contrast; Future    Dizziness     - Adult Neurology  Referral; Future  - MR Brain w/o & w Contrast; Future    Muscle spasm of right lower extremity     - MR Lumbar Spine w/o Contrast; Future             BMI:   Estimated body mass index is 26.57 kg/m  as calculated from the following:    Height as of this encounter: 1.676 m (5' 6\").    Weight as of this encounter: 74.7 kg (164 lb 9.6 oz).         No follow-ups on file.    Yael Rodgers MD  [facility] is a 52 year old, presenting for the following health issues:    Establish Care, RECHECK, Thyroid Disease, Anemia, and Edema    Not taking thyroid medication faithfully.    Thyroid Disease    Anemia    History of Present Illness       Hypothyroidism:     Since last visit, patient describes the following symptoms::  Fatigue and Weight gain    Weight gain::  11-15 lbs.    Reason for visit:  New doctoe needed.  Thyroid med refill, dizziness and severe lef cramps    She eats 2-3 servings of fruits and vegetables daily.She consumes 0 sweetened beverage(s) daily.She exercises with enough effort to increase her heart rate 20 to 29 minutes per day.  She exercises with enough effort to increase her heart rate 3 or less days per week. She is missing 3 dose(s) of medications per week.  She is not taking prescribed medications regularly due to remembering to take.    Today's PHQ-9         PHQ-9 Total " Score: 2    PHQ-9 Q9 Thoughts of better off dead/self-harm past 2 weeks :   Not at all    How difficult have these problems made it for you to do your work, take care of things at home, or get along with other people: Not difficult at all  Today's TAJ-7 Score: 1       Review of Systems   Constitutional, HEENT, cardiovascular, pulmonary, GI, , musculoskeletal, neuro, skin, endocrine and psych systems are negative, except as otherwise noted.      Objective    LMP 06/02/2011   There is no height or weight on file to calculate BMI.  Physical Exam   GENERAL: healthy, alert and no distress  NECK: no adenopathy, no asymmetry, masses, or scars and thyroid normal to palpation  RESP: lungs clear to auscultation - no rales, rhonchi or wheezes  CV: regular rate and rhythm, normal S1 S2, no S3 or S4, no murmur, click or rub, no peripheral edema and peripheral pulses strong  ABDOMEN: soft, nontender, no hepatosplenomegaly, no masses and bowel sounds normal  MS: no gross musculoskeletal defects noted, no edema              .  ..

## 2022-06-23 LAB — TSH SERPL DL<=0.005 MIU/L-ACNC: 3.71 MU/L (ref 0.4–4)

## 2022-07-01 ENCOUNTER — LAB (OUTPATIENT)
Dept: LAB | Facility: CLINIC | Age: 52
End: 2022-07-01
Payer: COMMERCIAL

## 2022-07-01 ENCOUNTER — HOSPITAL ENCOUNTER (OUTPATIENT)
Dept: MAMMOGRAPHY | Facility: CLINIC | Age: 52
Discharge: HOME | End: 2022-07-01
Attending: INTERNAL MEDICINE | Admitting: INTERNAL MEDICINE
Payer: COMMERCIAL

## 2022-07-01 DIAGNOSIS — Z12.31 VISIT FOR SCREENING MAMMOGRAM: ICD-10-CM

## 2022-07-01 DIAGNOSIS — D50.8 OTHER IRON DEFICIENCY ANEMIA: ICD-10-CM

## 2022-07-01 PROCEDURE — 82728 ASSAY OF FERRITIN: CPT

## 2022-07-01 PROCEDURE — 77067 SCR MAMMO BI INCL CAD: CPT

## 2022-07-01 PROCEDURE — 36415 COLL VENOUS BLD VENIPUNCTURE: CPT

## 2022-07-01 PROCEDURE — 83550 IRON BINDING TEST: CPT

## 2022-07-02 ENCOUNTER — HOSPITAL ENCOUNTER (OUTPATIENT)
Dept: MRI IMAGING | Facility: CLINIC | Age: 52
Discharge: HOME | End: 2022-07-02
Attending: INTERNAL MEDICINE
Payer: COMMERCIAL

## 2022-07-02 DIAGNOSIS — M54.41 MIDLINE LOW BACK PAIN WITH RIGHT-SIDED SCIATICA, UNSPECIFIED CHRONICITY: ICD-10-CM

## 2022-07-02 DIAGNOSIS — R27.9 INCOORDINATION: ICD-10-CM

## 2022-07-02 DIAGNOSIS — R42 DIZZINESS: ICD-10-CM

## 2022-07-02 DIAGNOSIS — M62.838 MUSCLE SPASM OF RIGHT LOWER EXTREMITY: ICD-10-CM

## 2022-07-02 LAB
FERRITIN SERPL-MCNC: 6 NG/ML (ref 8–252)
IRON SATN MFR SERPL: 6 % (ref 15–46)
IRON SERPL-MCNC: 22 UG/DL (ref 35–180)
TIBC SERPL-MCNC: 384 UG/DL (ref 240–430)

## 2022-07-02 PROCEDURE — A9585 GADOBUTROL INJECTION: HCPCS | Performed by: INTERNAL MEDICINE

## 2022-07-02 PROCEDURE — 72148 MRI LUMBAR SPINE W/O DYE: CPT

## 2022-07-02 PROCEDURE — 70553 MRI BRAIN STEM W/O & W/DYE: CPT

## 2022-07-02 PROCEDURE — 255N000002 HC RX 255 OP 636: Performed by: INTERNAL MEDICINE

## 2022-07-02 RX ORDER — GADOBUTROL 604.72 MG/ML
7.5 INJECTION INTRAVENOUS ONCE
Status: COMPLETED | OUTPATIENT
Start: 2022-07-02 | End: 2022-07-02

## 2022-07-02 RX ADMIN — GADOBUTROL 7.5 ML: 604.72 INJECTION INTRAVENOUS at 11:30

## 2022-07-05 ENCOUNTER — MYC MEDICAL ADVICE (OUTPATIENT)
Dept: INTERNAL MEDICINE | Facility: CLINIC | Age: 52
End: 2022-07-05

## 2022-07-05 DIAGNOSIS — D50.9 IRON DEFICIENCY ANEMIA, UNSPECIFIED IRON DEFICIENCY ANEMIA TYPE: Primary | ICD-10-CM

## 2022-07-05 RX ORDER — FERROUS SULFATE 325(65) MG
325 TABLET, DELAYED RELEASE (ENTERIC COATED) ORAL 2 TIMES DAILY WITH MEALS
Qty: 180 TABLET | Refills: 1 | Status: SHIPPED | OUTPATIENT
Start: 2022-07-05 | End: 2023-10-06

## 2022-07-07 DIAGNOSIS — M48.061 SPINAL STENOSIS OF LUMBAR REGION, UNSPECIFIED WHETHER NEUROGENIC CLAUDICATION PRESENT: Primary | ICD-10-CM

## 2022-07-07 DIAGNOSIS — M54.41 MIDLINE LOW BACK PAIN WITH RIGHT-SIDED SCIATICA, UNSPECIFIED CHRONICITY: ICD-10-CM

## 2022-07-07 NOTE — TELEPHONE ENCOUNTER
Prescription was sent for iron    But if she wants a referral to hematology to talks about iv iron I am willing to place this

## 2022-07-12 NOTE — TELEPHONE ENCOUNTER
Attempted to contact patient. Left voice message to call back or respond with yes or no to information sent through [employer].     Ayleen Bryant RN  [facility]

## 2022-07-13 ENCOUNTER — OFFICE VISIT (OUTPATIENT)
Dept: NEUROSURGERY | Facility: CLINIC | Age: 52
End: 2022-07-13
Attending: NURSE PRACTITIONER
Payer: COMMERCIAL

## 2022-07-13 VITALS — DIASTOLIC BLOOD PRESSURE: 72 MMHG | OXYGEN SATURATION: 99 % | SYSTOLIC BLOOD PRESSURE: 107 MMHG | HEART RATE: 51 BPM

## 2022-07-13 DIAGNOSIS — M54.41 MIDLINE LOW BACK PAIN WITH RIGHT-SIDED SCIATICA, UNSPECIFIED CHRONICITY: ICD-10-CM

## 2022-07-13 DIAGNOSIS — G95.9 CERVICAL MYELOPATHY (H): Primary | ICD-10-CM

## 2022-07-13 DIAGNOSIS — M48.061 SPINAL STENOSIS OF LUMBAR REGION, UNSPECIFIED WHETHER NEUROGENIC CLAUDICATION PRESENT: ICD-10-CM

## 2022-07-13 PROCEDURE — G0463 HOSPITAL OUTPT CLINIC VISIT: HCPCS

## 2022-07-13 PROCEDURE — 99204 OFFICE O/P NEW MOD 45 MIN: CPT | Performed by: STUDENT IN AN ORGANIZED HEALTH CARE EDUCATION/TRAINING PROGRAM

## 2022-07-13 NOTE — PROGRESS NOTES
"HPI:  52-year-old female with progressive worsening of cramping in her bilateral lower extremities.  She does note bilateral low back pain with increased activity.  She treats this with over-the-counter medications, heat and ice which usually subsides after a day or 2.  The cramping in the legs also does come on with increased activity but is not radiate from the low back.  It is confined mainly to the lower extremities.  She has had work-ups for metabolic reasons for the cramping but has not found any.  She reports her balance is not very good and her family notices her unsteadiness with her gait.  She also reports difficulty with urination which has been going on for the last 6 months at least.  This is also the timeline for her worsening balance problems.  She has no leg pain when the spasms in her calfs are not present.  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  Current Outpatient Medications   Medication     ferrous sulfate (FE TABS) 325 (65 Fe) MG EC tablet     levothyroxine (SYNTHROID/LEVOTHROID) 150 MCG tablet     Current Facility-Administered Medications   Medication     cyanocobalamin injection 1,000 mcg      Physical Exam:  Vital signs:                         Estimated body mass index is 26.57 kg/m  as calculated from the following:    Height as of 6/22/22: 1.676 m (5' 6\").    Weight as of 6/22/22: 74.7 kg (164 lb 9.6 oz).   She has full strength in her bilateral upper and lower extremities.  Sensation is intact light touch throughout.  Biceps reflexes are 2+ bilaterally with 3+ reflexes in the patellas and potential cross abductor sign..  There is a questionable left Anuradha sign present.  Results Reviewed:  I personally viewed the MRI of the lumbar spine showing an L4-5 disc bulge with some possible stenosis affecting the traversing L5 root on the left.  However " there are no other areas of significant central canal or foraminal stenosis in the lumbar spine.  There is multilevel spondylosis expected for her age.  Assessment:  52-year-old female with leg cramping and low back pain.  I do not see any evidence that her leg cramping is associated with the lumbar spine given the lack of pathology on both sides and the symptoms she is experiencing.  However with her hyperreflexia in the lower extremities along with her urinary problems and gait imbalance this points more towards an upper motor neuron problem.   Plan:   I would like to have her evaluated with an MRI of the cervical and thoracic spine to evaluate for anatomic causes of her current symptoms.  We will obtain these MRIs and have her follow-up in clinic with me when these are done.  Should they show a pathologic area of compression we will discuss options.  If these do not show any other issues I would likely refer her to neurology for further evaluation.  For her low back pain we will start her off with physical therapy as well as pain management as I do not see any surgical structural causes to improve her low back pain.    Bill Bettencourt MD

## 2022-07-13 NOTE — LETTER
"    7/13/2022         RE: Carlie Brumfield  [address] [facility] [id number]        Dear Colleague,    Thank you for referring your patient, Carlie Brumfield, to the [facility] [place]. Please see a copy of my visit note below.    HPI:  52-year-old female with progressive worsening of cramping in her bilateral lower extremities.  She does note bilateral low back pain with increased activity.  She treats this with over-the-counter medications, heat and ice which usually subsides after a day or 2.  The cramping in the legs also does come on with increased activity but is not radiate from the low back.  It is confined mainly to the lower extremities.  She has had work-ups for metabolic reasons for the cramping but has not found any.  She reports her balance is not very good and her family notices her unsteadiness with her gait.  She also reports difficulty with urination which has been going on for the last 6 months at least.  This is also the timeline for her worsening balance problems.  She has no leg pain when the spasms in her calfs are not present.  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  Current Outpatient Medications   Medication     ferrous sulfate (FE TABS) 325 (65 Fe) MG EC tablet     levothyroxine (SYNTHROID/LEVOTHROID) 150 MCG tablet     Current Facility-Administered Medications   Medication     cyanocobalamin injection 1,000 mcg      Physical Exam:  Vital signs:                         Estimated body mass index is 26.57 kg/m  as calculated from the following:    Height as of 6/22/22: 1.676 m (5' 6\").    Weight as of 6/22/22: 74.7 kg (164 lb 9.6 oz).   She has full strength in her bilateral upper and lower extremities.  Sensation is intact light touch throughout.  Biceps reflexes are 2+ bilaterally with 3+ reflexes in " the patellas and potential cross abductor sign..  There is a questionable left Anuradha sign present.  Results Reviewed:  I personally viewed the MRI of the lumbar spine showing an L4-5 disc bulge with some possible stenosis affecting the traversing L5 root on the left.  However there are no other areas of significant central canal or foraminal stenosis in the lumbar spine.  There is multilevel spondylosis expected for her age.  Assessment:  52-year-old female with leg cramping and low back pain.  I do not see any evidence that her leg cramping is associated with the lumbar spine given the lack of pathology on both sides and the symptoms she is experiencing.  However with her hyperreflexia in the lower extremities along with her urinary problems and gait imbalance this points more towards an upper motor neuron problem.   Plan:   I would like to have her evaluated with an MRI of the cervical and thoracic spine to evaluate for anatomic causes of her current symptoms.  We will obtain these MRIs and have her follow-up in clinic with me when these are done.  Should they show a pathologic area of compression we will discuss options.  If these do not show any other issues I would likely refer her to neurology for further evaluation.  For her low back pain we will start her off with physical therapy as well as pain management as I do not see any surgical structural causes to improve her low back pain.    Bill Bettencourt MD      Again, thank you for allowing me to participate in the care of your patient.        Sincerely,        Bill Bettencourt MD

## 2022-07-13 NOTE — PATIENT INSTRUCTIONS
Patient Next Steps:    Order placed for physical therapy. You can call the phone number highlighted in the order to schedule your appointment. Please call our clinic if symptoms persist after your course of physical therapy.    Order placed for Cervical and Thoracic spine MRI. If you do not hear from them within 1-2 business days you can call the numbers below. These can be scheduled to be completed the same day you are seeing Dr. Bettencourt  [phone]     Dr. Bettencourt would like to see you back in the clinic for follow up once you have completed the MRIs (these can be scheduled on the same day). Please call the number below to schedule.     A referral has been placed for you to see Dr. Leger with medical spine for low back pain.     Please call us if you have any further questions or concerns.    [facility]   Phone: [phone]  Fax: [phone]

## 2022-08-02 ENCOUNTER — HOSPITAL ENCOUNTER (OUTPATIENT)
Dept: MRI IMAGING | Facility: CLINIC | Age: 52
Discharge: HOME | End: 2022-08-02
Attending: STUDENT IN AN ORGANIZED HEALTH CARE EDUCATION/TRAINING PROGRAM
Payer: COMMERCIAL

## 2022-08-02 DIAGNOSIS — G95.9 CERVICAL MYELOPATHY (H): ICD-10-CM

## 2022-08-02 PROCEDURE — 72146 MRI CHEST SPINE W/O DYE: CPT

## 2022-08-02 PROCEDURE — 72141 MRI NECK SPINE W/O DYE: CPT

## 2022-08-30 ENCOUNTER — OFFICE VISIT (OUTPATIENT)
Dept: ORTHOPEDICS | Facility: CLINIC | Age: 52
End: 2022-08-30
Payer: COMMERCIAL

## 2022-08-30 VITALS
WEIGHT: 164 LBS | DIASTOLIC BLOOD PRESSURE: 79 MMHG | BODY MASS INDEX: 26.36 KG/M2 | SYSTOLIC BLOOD PRESSURE: 127 MMHG | HEART RATE: 47 BPM | HEIGHT: 66 IN | OXYGEN SATURATION: 100 %

## 2022-08-30 DIAGNOSIS — M54.41 MIDLINE LOW BACK PAIN WITH RIGHT-SIDED SCIATICA, UNSPECIFIED CHRONICITY: ICD-10-CM

## 2022-08-30 DIAGNOSIS — M47.817 FACET ARTHROPATHY, LUMBOSACRAL: Primary | ICD-10-CM

## 2022-08-30 DIAGNOSIS — M48.061 SPINAL STENOSIS OF LUMBAR REGION, UNSPECIFIED WHETHER NEUROGENIC CLAUDICATION PRESENT: ICD-10-CM

## 2022-08-30 DIAGNOSIS — M25.552 GREATER TROCHANTERIC PAIN SYNDROME OF LEFT LOWER EXTREMITY: ICD-10-CM

## 2022-08-30 DIAGNOSIS — M25.551 GREATER TROCHANTERIC PAIN SYNDROME OF RIGHT LOWER EXTREMITY: ICD-10-CM

## 2022-08-30 PROCEDURE — 99205 OFFICE O/P NEW HI 60 MIN: CPT | Mod: GC | Performed by: PHYSICAL MEDICINE & REHABILITATION

## 2022-08-30 ASSESSMENT — PAIN SCALES - GENERAL: PAINLEVEL: MODERATE PAIN (4)

## 2022-08-30 NOTE — PATIENT INSTRUCTIONS
Please schedule fluoroscopic-guided medial branch blocks with me.  The [facility] Procedure Scheduling Team will call you to schedule your procedure in the next 0-3 days.  You can also call them directly at [phone].

## 2022-08-30 NOTE — PROGRESS NOTES
Patient seen at the request of Bill Bettencourt for an opinion and evaluation of low back pain.      HISTORY OF PRESENT ILLNESS:  Carlie Brumfield is a 52 year old female who presents for evaluation of axial low back pain.  She is driving with her  Sridhar today.     Patient reports pain began many years ago, she is unsure of etiology but there was no trauma associated with onset.  Since that time pain has been slowly progressing in intensity and more recently having a greater impact on her daily activities.  She localizes pain to the axial low back in the midline at approximately the L4/L5 region.  This pain does not radiate from the low back but she additionally reports pain at the lateral hips bilateral (right > left).  This pain worsens with any activity such as standing and prolonged periods of walking.  It improves with leaning forward and rest.  She has significant morning stiffness.    She denies any pain radiating down her bilateral lower extremities.  Occasionally she will have a cramping sensation in the back of her left calf.  She denies any weakness in her lower extremities but reports general fatigued with prolonged ambulation and standing.  She denies any numbness/paresthesias.    She denies any red flag symptoms including progressive weakness, saddle anesthesia, bowel/bladder incontinence or unintended weight loss.  She does have some intermittent stress incontinence.    Of note she was recently seen by Dr. Bettencourt with neurosurgery who obtained an MRI of her cervical and thoracic spine due to concerns for myelopathy/upper motor neuron compromise.  These were reviewed with her today.    Functional limitations: Unable to walk prolonged periods without significant discomfort      PRIOR INJURIES/TREATMENT:   Ice/Heat: Minimal relief  Brace: None  Physical Therapy:   Perform some stretches at home, no previous formal physical therapy     - Current Pain Medications -   Tylenol and ibuprofen as needed,  mild relief    - Prior/Trialed Pain Medications -   None    Prior Procedures:  Date      Procedure     Improvement (%)  None        Prior Related Surgery: None  Other (acupuncture, OMT, CMM, TENS, DME, etc.): None    Specialists Seen - (with most recent, available notes and clinic visits reviewed)   1.  Neurosurgery, Dr. Bettencourt    IMAGING - reviewed   MRI LUMBAR SPINE 07/02/2022  FINDINGS:   Nomenclature is based on 5 lumbar type vertebral bodies. Normal vertebral body heights, alignment and marrow signal. Normal distal spinal cord and cauda equina with conus medullaris at L2. Left renal cyst, as seen on the 12/31/2015 CT abdomen pelvis   study. Unremarkable visualized bony pelvis.     T12-L1: Normal disc height and signal. No herniation. Normal facets. No spinal canal or neural foraminal stenosis.      L1-L2: Normal disc height and signal. No herniation. Normal facets. No spinal canal or neural foraminal stenosis.     L2-L3: Moderate loss of disc height and T2-weighted signal in the disc. Mild right lateral recess stenosis. No facet arthropathy. Mild spinal canal stenosis. No neural foraminal stenosis.      L3-L4: Normal disc height. Moderate loss of T2-weighted signal in the disc. Moderate disc bulge. Mild left lateral recess stenosis. Mild bilateral facet arthropathy. No spinal canal stenosis. No neural foraminal stenosis.     L4-L5: Mild loss of disc height. Moderate loss of T2-weighted signal in the disc. Central disc protrusion. Mild right and mild to moderate left lateral recess stenosis. Mild bilateral facet arthropathy. Mild spinal canal stenosis. Mild right and no left   neural foraminal stenosis.     L5-S1: Normal disc height and signal. No herniation. Normal facets. No spinal canal or neural foraminal stenosis.                                                                      IMPRESSION:  1.  At L4-L5, there is a central disc protrusion which results in mild right and mild to moderate left lateral  recess stenosis. There is mild spinal canal stenosis and mild right neural foraminal stenosis.     2.  At L3-L4, there is mild left lateral recess stenosis.     3.  At L2-L3, there is mild right lateral recess stenosis.    MRI THORACIC SPINE 08/02/2022  FINDINGS:   Mild reversal of the lower thoracic kyphosis without subluxation. Mild  chronic appearing endplate height loss T7-T9. No compression  deformity. Normal thoracic spinal cord. Generally mild degenerative  loss of height and signal affecting the thoracic intervertebral discs  most pronounced at T7-T8. No herniation. Facet arthropathy is most  pronounced on the left at T9-T10 where there is moderate foraminal  narrowing. Other foramina appear adequate. No central thoracic spinal  canal stenosis. T9 vertebral body hemangioma.                                                                      IMPRESSION:  1.  No evidence for recent thoracic spine injury. Normal thoracic  spinal cord. No central canal stenosis.  2.  Left-sided facet arthropathy at T9-T10 causes moderate foraminal  narrowing.       FINDINGS: Mild convex left cervical curvature. No subluxation. Normal  vertebral body heights. Normal spinal cord. Benign marrow, no edema.     C2-C3: Preserved interspace. No herniation. Minor facet arthropathy.  No stenosis.     C3-C4: Preserved interspace. No herniation. Mild right uncovertebral  and facet joint degeneration. No central stenosis or left foraminal  narrowing. Mild right foraminal narrowing.     C4-C5: Preserved interspace. No herniation. Mild-to-moderate right  facet arthropathy. No stenosis.     C5-C6: Preserved interspace. No herniation. Mild right facet  arthropathy. No stenosis.     C6-C7: Preserved interspace. Small central extrusion. Unremarkable  facets. Mild spinal canal narrowing. Mild-to-moderate left foraminal  narrowing. No right foraminal narrowing.     C7-T1: Preserved interspace. No herniation. Unremarkable facets. No  stenosis.       "                                                                IMPRESSION:  1.  Generally mild degenerative changes without high-grade stenosis.  2.  At C6-C7 small central herniation causes mild spinal canal and  mild-to-moderate left foraminal narrowing.  3.  At C3-C4 there is mild right foraminal narrowing.    Review Of Systems:  I am responding to those symptoms which are directly relevant to the specific indication for my consultation. I recommend that the patient follow up with their primary or referring provider to pursue any other symptoms which may be of concern.       Medical History:  She  has a past medical history of History of blood transfusion, Meningitis due to viruses not elsewhere classified (2001), Thyroid disease, and Unspecified urinary incontinence.     She  has a past surgical history that includes NONSPECIFIC PROCEDURE; NONSPECIFIC PROCEDURE; NONSPECIFIC PROCEDURE; hysterectomy, pap no longer indicated; GI surgery; Esophagoscopy, gastroscopy, duodenoscopy (EGD), combined; appendectomy; orthopedic surgery; Esophagoscopy, gastroscopy, duodenoscopy (EGD), combined (N/A, 12/18/2015); Colonoscopy (N/A, 7/10/2018); and biopsy.    Family History  Her family history includes Osteoporosis in her mother; Unknown/Adopted in her father.     Social History:  Work: [employer]   Current living situation: Lives with  and adult children  She  reports that she has never smoked. She has never used smokeless tobacco. She reports current alcohol use. She reports that she does not use drugs.     Current Medications:   She has a current medication list which includes the following prescription(s): ferrous sulfate and levothyroxine, and the following Facility-Administered Medications: cyanocobalamin.     Allergies:   Allergies   Allergen Reactions     No Known Allergies        VITALS  /79   Pulse (!) 47   Ht 1.676 m (5' 6\")   Wt 74.4 kg (164 lb)   LMP 06/02/2011   SpO2 100%   BMI 26.47 kg/m  "     PHYSICAL EXAMINATION:  General: Pleasant, straightforward, WDWN individual.  Mental Status: Pleasant, direct, appropriate mood and affect  Resp: breathing is unlabored without audible wheeze  Vascular: Palpable pedal pulses, no cyanosis, no venous stasis changes  Heme: no visible ecchymosis or erythema on extremities  Skin: No notable rash    NEURO  Strength   Right  Left  Hip Flexion   5/5  5/5  Hip Extension   5/5  5/5  Hip ABduction   5/5  5/5  Hip ADduction   5/5  5/5  Knee Extension  5/5  5/5  Ankle Dorsiflexion  5/5  5/5  Extensor Hallicus Longus 5/5  5/5  Ankle Plantar Flexion  5/5  5/5    Sensation  Sensation intact to light touch bilateral. No difference in pin-prick sensation.    Reflexes   Right  Left  Patellar   2+  2+  Achilles   2+  2+  Clonus    NEG  NEG  Babinski   NEG  NEG    MSK  Inspection  No obvious deformities. No significant pelvic tilt. Normal lordosis of lumbar spine.    Palpation  Tenderness to palpation of the midline spine in bilateral lumbar facets.  Additionally tender at the greater trochanteric bursa bilateral right> left    Range of motion  Back: Appropriate age match forward flexion/extension, pain with left facet loading  Hips: Full ROM with bilateral hip flexion, internal and external rotation.    Provocation   Right  Left  Radiculopathy Provocation   Straight leg raise  NEG  NEG    SI Joint Provocation    Jeane Finger   NEG  NEG  Distraction   NEG  NEG  Sacral thrust   NEG  NEG  Yoman    NEG  NEG   Gaenslen   NEG  NEG  MERCY   NEG  NEG      ASSESSMENT:  Carlie Brumfield is a pleasant 52 year old female who presents with the following:    #Chronic axial low back pain   #Mild spondylosis without high-grade stenosis  #Bilateral greater trochanteric bursitis right> left    PLAN:  1. Diagnostic Work Up:   a. No further work-up at this time  2. Therapies / Home Exercise Program:  a. Referral placed for physical therapy for education of home exercise program.  3. Medications:  a. No  medications prescribed at this time.  4. Interventions:  a. Scheduled for bilateral MBB of the L3/L4 and L4/L5/S1  5. Referrals:  a. None at this time  6. Follow Up:  a. Return to clinic for procedure      Ready to learn, no apparent learning barriers.  Education provided on treatment plan according to patient's preferred learning style.  Patient verbalizes understanding.   __________________________________  Arturo Garay MD  Physical Medicine and Rehabilitation      60 minutes spent on the date of the encounter doing chart review, history and exam, documentation and further activities per the note

## 2022-08-30 NOTE — LETTER
8/30/2022         RE: Carlie Brumfield  [address] [facility] [address]        Dear Colleague,    Thank you for referring your patient, Carlie Brumfield, to the [facility]. Please see a copy of my visit note below.    Patient seen at the request of Bill Bettencourt for an opinion and evaluation of low back pain.      HISTORY OF PRESENT ILLNESS:  Carlie Brumfield is a 52 year old female who presents for evaluation of axial low back pain.  She is driving with her  Sridhar today.     Patient reports pain began many years ago, she is unsure of etiology but there was no trauma associated with onset.  Since that time pain has been slowly progressing in intensity and more recently having a greater impact on her daily activities.  She localizes pain to the axial low back in the midline at approximately the L4/L5 region.  This pain does not radiate from the low back but she additionally reports pain at the lateral hips bilateral (right > left).  This pain worsens with any activity such as standing and prolonged periods of walking.  It improves with leaning forward and rest.  She has significant morning stiffness.    She denies any pain radiating down her bilateral lower extremities.  Occasionally she will have a cramping sensation in the back of her left calf.  She denies any weakness in her lower extremities but reports general fatigued with prolonged ambulation and standing.  She denies any numbness/paresthesias.    She denies any red flag symptoms including progressive weakness, saddle anesthesia, bowel/bladder incontinence or unintended weight loss.  She does have some intermittent stress incontinence.    Of note she was recently seen by Dr. Bettencourt with neurosurgery who obtained an MRI of her cervical and thoracic spine due to concerns for myelopathy/upper motor neuron compromise.  These were reviewed with her today.    Functional limitations: Unable to walk prolonged periods  without significant discomfort      PRIOR INJURIES/TREATMENT:   Ice/Heat: Minimal relief  Brace: None  Physical Therapy:   Perform some stretches at home, no previous formal physical therapy     - Current Pain Medications -   Tylenol and ibuprofen as needed, mild relief    - Prior/Trialed Pain Medications -   None    Prior Procedures:  Date      Procedure     Improvement (%)  None        Prior Related Surgery: None  Other (acupuncture, OMT, CMM, TENS, DME, etc.): None    Specialists Seen - (with most recent, available notes and clinic visits reviewed)   1.  Neurosurgery, Dr. Bettencourt    IMAGING - reviewed   MRI LUMBAR SPINE 07/02/2022  FINDINGS:   Nomenclature is based on 5 lumbar type vertebral bodies. Normal vertebral body heights, alignment and marrow signal. Normal distal spinal cord and cauda equina with conus medullaris at L2. Left renal cyst, as seen on the 12/31/2015 CT abdomen pelvis   study. Unremarkable visualized bony pelvis.     T12-L1: Normal disc height and signal. No herniation. Normal facets. No spinal canal or neural foraminal stenosis.      L1-L2: Normal disc height and signal. No herniation. Normal facets. No spinal canal or neural foraminal stenosis.     L2-L3: Moderate loss of disc height and T2-weighted signal in the disc. Mild right lateral recess stenosis. No facet arthropathy. Mild spinal canal stenosis. No neural foraminal stenosis.      L3-L4: Normal disc height. Moderate loss of T2-weighted signal in the disc. Moderate disc bulge. Mild left lateral recess stenosis. Mild bilateral facet arthropathy. No spinal canal stenosis. No neural foraminal stenosis.     L4-L5: Mild loss of disc height. Moderate loss of T2-weighted signal in the disc. Central disc protrusion. Mild right and mild to moderate left lateral recess stenosis. Mild bilateral facet arthropathy. Mild spinal canal stenosis. Mild right and no left   neural foraminal stenosis.     L5-S1: Normal disc height and signal. No  herniation. Normal facets. No spinal canal or neural foraminal stenosis.                                                                      IMPRESSION:  1.  At L4-L5, there is a central disc protrusion which results in mild right and mild to moderate left lateral recess stenosis. There is mild spinal canal stenosis and mild right neural foraminal stenosis.     2.  At L3-L4, there is mild left lateral recess stenosis.     3.  At L2-L3, there is mild right lateral recess stenosis.    MRI THORACIC SPINE 08/02/2022  FINDINGS:   Mild reversal of the lower thoracic kyphosis without subluxation. Mild  chronic appearing endplate height loss T7-T9. No compression  deformity. Normal thoracic spinal cord. Generally mild degenerative  loss of height and signal affecting the thoracic intervertebral discs  most pronounced at T7-T8. No herniation. Facet arthropathy is most  pronounced on the left at T9-T10 where there is moderate foraminal  narrowing. Other foramina appear adequate. No central thoracic spinal  canal stenosis. T9 vertebral body hemangioma.                                                                      IMPRESSION:  1.  No evidence for recent thoracic spine injury. Normal thoracic  spinal cord. No central canal stenosis.  2.  Left-sided facet arthropathy at T9-T10 causes moderate foraminal  narrowing.       FINDINGS: Mild convex left cervical curvature. No subluxation. Normal  vertebral body heights. Normal spinal cord. Benign marrow, no edema.     C2-C3: Preserved interspace. No herniation. Minor facet arthropathy.  No stenosis.     C3-C4: Preserved interspace. No herniation. Mild right uncovertebral  and facet joint degeneration. No central stenosis or left foraminal  narrowing. Mild right foraminal narrowing.     C4-C5: Preserved interspace. No herniation. Mild-to-moderate right  facet arthropathy. No stenosis.     C5-C6: Preserved interspace. No herniation. Mild right facet  arthropathy. No  stenosis.     C6-C7: Preserved interspace. Small central extrusion. Unremarkable  facets. Mild spinal canal narrowing. Mild-to-moderate left foraminal  narrowing. No right foraminal narrowing.     C7-T1: Preserved interspace. No herniation. Unremarkable facets. No  stenosis.                                                                      IMPRESSION:  1.  Generally mild degenerative changes without high-grade stenosis.  2.  At C6-C7 small central herniation causes mild spinal canal and  mild-to-moderate left foraminal narrowing.  3.  At C3-C4 there is mild right foraminal narrowing.    Review Of Systems:  I am responding to those symptoms which are directly relevant to the specific indication for my consultation. I recommend that the patient follow up with their primary or referring provider to pursue any other symptoms which may be of concern.       Medical History:  She  has a past medical history of History of blood transfusion, Meningitis due to viruses not elsewhere classified (2001), Thyroid disease, and Unspecified urinary incontinence.     She  has a past surgical history that includes NONSPECIFIC PROCEDURE; NONSPECIFIC PROCEDURE; NONSPECIFIC PROCEDURE; hysterectomy, pap no longer indicated; GI surgery; Esophagoscopy, gastroscopy, duodenoscopy (EGD), combined; appendectomy; orthopedic surgery; Esophagoscopy, gastroscopy, duodenoscopy (EGD), combined (N/A, 12/18/2015); Colonoscopy (N/A, 7/10/2018); and biopsy.    Family History  Her family history includes Osteoporosis in her mother; Unknown/Adopted in her father.     Social History:  Work: [employer]   Current living situation: Lives with  and adult children  She  reports that she has never smoked. She has never used smokeless tobacco. She reports current alcohol use. She reports that she does not use drugs.     Current Medications:   She has a current medication list which includes the following prescription(s): ferrous sulfate and levothyroxine, and  "the following Facility-Administered Medications: cyanocobalamin.     Allergies:   Allergies   Allergen Reactions     No Known Allergies        VITALS  /79   Pulse (!) 47   Ht 1.676 m (5' 6\")   Wt 74.4 kg (164 lb)   LMP 06/02/2011   SpO2 100%   BMI 26.47 kg/m      PHYSICAL EXAMINATION:  General: Pleasant, straightforward, WDWN individual.  Mental Status: Pleasant, direct, appropriate mood and affect  Resp: breathing is unlabored without audible wheeze  Vascular: Palpable pedal pulses, no cyanosis, no venous stasis changes  Heme: no visible ecchymosis or erythema on extremities  Skin: No notable rash    NEURO  Strength   Right  Left  Hip Flexion   5/5  5/5  Hip Extension   5/5  5/5  Hip ABduction   5/5  5/5  Hip ADduction   5/5  5/5  Knee Extension  5/5  5/5  Ankle Dorsiflexion  5/5  5/5  Extensor Hallicus Longus 5/5  5/5  Ankle Plantar Flexion  5/5  5/5    Sensation  Sensation intact to light touch bilateral. No difference in pin-prick sensation.    Reflexes   Right  Left  Patellar   2+  2+  Achilles   2+  2+  Clonus    NEG  NEG  Babinski   NEG  NEG    MSK  Inspection  No obvious deformities. No significant pelvic tilt. Normal lordosis of lumbar spine.    Palpation  Tenderness to palpation of the midline spine in bilateral lumbar facets.  Additionally tender at the greater trochanteric bursa bilateral right> left    Range of motion  Back: Appropriate age match forward flexion/extension, pain with left facet loading  Hips: Full ROM with bilateral hip flexion, internal and external rotation.    Provocation   Right  Left  Radiculopathy Provocation   Straight leg raise  NEG  NEG    SI Joint Provocation    Jeane Finger   NEG  NEG  Distraction   NEG  NEG  Sacral thrust   NEG  NEG  Yoman    NEG  NEG   Gaenslen   NEG  NEG  MERCY   NEG  NEG      ASSESSMENT:  Carlie Brumfield is a pleasant 52 year old female who presents with the following:    #Chronic axial low back pain   #Mild spondylosis without high-grade " stenosis  #Bilateral greater trochanteric bursitis right> left    PLAN:  1. Diagnostic Work Up:   a. No further work-up at this time  2. Therapies / Home Exercise Program:  a. Referral placed for physical therapy for education of home exercise program.  3. Medications:  a. No medications prescribed at this time.  4. Interventions:  a. Scheduled for bilateral MBB of the L3/L4 and L4/L5/S1  5. Referrals:  a. None at this time  6. Follow Up:  a. Return to clinic for procedure      Ready to learn, no apparent learning barriers.  Education provided on treatment plan according to patient's preferred learning style.  Patient verbalizes understanding.   __________________________________  Arturo Garay MD  Physical Medicine and Rehabilitation      60 minutes spent on the date of the encounter doing chart review, history and exam, documentation and further activities per the note         Attestation signed by Alpesh Leger MD at 8/30/2022 12:04 PM:  Physician Attestation   I, Alpesh Leger MD, saw this patient and agree with the findings and plan of care as documented in the note.      Items personally reviewed/procedural attestation: vitals and lumbar spine imaging and agree with the interpretation documented in the note.        HISTORY OF PRESENT ILLNESS:  Ms. Carlie Brumfield is a 52-year-old female.  She is accompanied to today's appointment by her , Mr. David Brumfield.  They have a son who is currently in school at the [employer].  They have a daughter who is in school at the [employer].  Ms. Brumfield works for [employer].    Ms. Carlie Brumfield has had had bilateral low back pain for the past 1 year.  This pain has been gradually worsening.  There is no radiation of her low back pain.  There is some bilateral lateral hip pain that is worse on the right than on the left.  Bilateral low back pain is intermittent.  Pain is worsened with standing and walking.  Pain is currently rated as  3/10.  Pain varies from 1/10 to 8/10.    Ms. Carlie Brumfield states she had a knee surgery and had a foot surgery.  She denies any other injuries or surgeries of her low back, pelvis, hips, thighs, knees, legs, ankles, feet, toes.    Ms. Carlie Brumfield denies any weakness.  She denies any give way episodes of her lower extremities.  She denies any tripping or stumbling.  She denies any falls.  Ms. Brumfield notes some occasional numbness and tingling in her hands.  She denies any other numbness, tingling, pins-and-needles.  Ms. Brumfield denies any saddle anesthesia or bowel incontinence.  She does have some problems with urinary incontinence.    Ms. Carlie Brumfield denies that her bilateral lower extremities become weak and tired with walking.  She finds that her walking is limited by worsening pain in her low back.    Ms. Carlie Brumfield denies any personal or family history of autoimmune diseases, rheumatologic disease, gout, pseudogout.  She denies any personal or family history of neurologic diseases.  Ms. Brumfield denies any personal or family history of inflammatory bowel diseases.      REVIEW OF SYSTEMS:  Positive for weight gain, headache, palpitations, feet/leg swelling, color changes in hands/feet, loss of bladder control, joint pain, muscle pain, muscle fatigue, imbalance, falls, dizziness, easy bruising, insomnia, excessive tiredness.  Negative for fever, weight loss, sexual dysfunction, hoarseness, ringing in ears, difficulty swallowing, changes in vision, eye pain, blindness, chest pain, shortness of breath, cough, wheezing, enlarged lymph nodes, abdominal pain, diarrhea, constipation, nausea/vomiting, reflux, loss of bowel control, blood in the stool, pain with urinating, difficulty urinating, sciatica, itching, open wounds, nonhealing wound, fainting, seizures, sickle cell anemia, hemophilia, easy bleeding, anxiety, depression, suicidal ideas.      PHYSICAL EXAMINATION:  Vitals:    08/30/22 0929  "  BP: 127/79   Pulse: (!) 47   SpO2: 100%   Weight: 74.4 kg (164 lb)   Height: 1.676 m (5' 6\")     GENERAL:  No acute distress.  Pleasant and cooperative.   PSYCH:  Normal mood and affect.  HEAD:  Normocephalic.  SPEECH:  No dysarthria.  EYES:  No scleral icterus.  Wearing glasses.  EARS:  Hearing is intact to spoken voice.  NOSE/MOUTH:  Wearing a face mask.  LUNGS:  No respiratory distress.  No increased work of breathing.    BALANCE AND GAIT: The patient has reciprocal gait pattern without antalgia.  She is able heel walk, toe walk, and tandem walk without difficulty.  Double leg squat is performed with quadriceps dominant pattern.    INSPECTION:  There is no erythema, ecchymosis, deformity, asymmetry, or abnormality of the low back.    LUMBOPELVIC PALPATION:  Lumbar Spinous Processes: Mild tenderness L4, L5.  Lumbar Paraspinals:  Right mild to moderate tenderness L3, L4, L5.  Left mild to moderate tenderness L3, L4, L5.  Posterior Superior Iliac Spine:  Right mild to moderate tenderness.  Left mild to moderate tenderness.  Iliac Crest:  Right not tender.  Left not tender.  Sacroiliac Joint:  Right mild to moderate tenderness.  Left mild to moderate tenderness.  Hip External Rotators:  Right not tender.  Left not tender.  Ischial Tuberosity:  Right not tender.  Left not tender.  Greater Trochanter:  Right mild-moderate tenderness.  Left mild to moderate tenderness.  Coccyx: Slight tenderness.    THORACOLUMBAR RANGE OF MOTION:  Forward flexion (85 ): Normal range of motion, does not cause pain, does not cause radiating pain.  Extension (30 ): Mildly to moderately reduced range of motion, increases low back pain, does not cause radiating pain.  Lateral bending right (30 ): Mildly reduced range of motion, does not cause pain, does not cause radiating pain.  Lateral bending left (30 ):  Mildly reduced range of motion, does not cause pain, does not cause radiating pain.  Twisting right with extension:  Mildly to " moderately reduced range of motion, increases low back pain, does not cause radiating pain.  Twisting left with extension:  Mildly to moderately reduced range of motion, increases low back pain, does not cause radiating pain.  Sacroiliac joint dysfunction:  Right -.  Left -.    HIP RANGE OF MOTION:  Flexion (110 ):  Right 110 .  Left 110 .  Extension (30 ):  Right 30 .  Left 30 .  External rotation (45 ):  Right 45 .  Left 45 .  Internal rotation (35 ):  Right 35 .  Left 35 .    KNEE RANGE OF MOTION:  Extension (0 ):  Right 0 .  Left 0 .  Flexion (135 ):  Right 145 .  Left 145 .    STRENGTH:  Hip flexion:  Right 5/5.  Left 5/5.  Hip abduction:  Right 5/5.  Left 5/5.  Hip external rotation:  Right 5/5.  Left 5/5.  Hip extension:  Right 5/5.  Left 5/5.    LUMBOPELVIC SPECIAL TESTS:  Log roll:  Right -.  Left -.  Straight leg raise:  Right -.  Left -.  Crossover straight leg raise:  Right -.  Left -.  Resisted straight leg raise:  Right -.  Left -.  MERCY:  Right -.  Left -.  FADIR:  Right -.  Left -.  Hip scour:  Right -.  Left -.  Lateral sacral compression:  Right -.  Left -.  Clamshell:  Right -.  Left -.  Ely s:  Right -.  Left -.  Yeoman s:  Right -.  Left -.  Distraction:  Right -.  Left -.  Sacral thrust:  Right -.  Left -.  Noble compression:  Right -.  Left -.      ASSESSMENT/PLAN:  Ms. Carlie Brumfield  is a 52-year-old female.  She has symptomatic bilateral lumbosacral facet arthropathy.  She does also have bilateral greater trochanteric pain syndrome, right greater than left.  Discussed lumbosacral facet arthropathy with Ms. and Mr. Brumfield.  Discussed the options doing nothing/living with it, physical therapy, chiropractic care, oral medications such as gabapentin and pregabalin, lumbosacral facet joint medial branch blocks with following radiofrequency ablations.  Ms. Brumfield will be set up for medial branch blocks of the bilateral L3-L4 and L4-L5 facet joints.      Alpesh Leger MD

## 2022-09-01 ENCOUNTER — HOSPITAL ENCOUNTER (OUTPATIENT)
Dept: GENERAL RADIOLOGY | Facility: CLINIC | Age: 52
Discharge: HOME | End: 2022-09-01
Attending: PHYSICAL MEDICINE & REHABILITATION | Admitting: PHYSICAL MEDICINE & REHABILITATION
Payer: COMMERCIAL

## 2022-09-01 VITALS — OXYGEN SATURATION: 99 % | HEART RATE: 83 BPM | DIASTOLIC BLOOD PRESSURE: 44 MMHG | SYSTOLIC BLOOD PRESSURE: 100 MMHG

## 2022-09-01 DIAGNOSIS — M47.817 FACET ARTHROPATHY, LUMBOSACRAL: ICD-10-CM

## 2022-09-01 PROCEDURE — 64494 INJ PARAVERT F JNT L/S 2 LEV: CPT | Mod: 50 | Performed by: PHYSICAL MEDICINE & REHABILITATION

## 2022-09-01 PROCEDURE — 64493 INJ PARAVERT F JNT L/S 1 LEV: CPT | Mod: 50

## 2022-09-01 PROCEDURE — 250N000009 HC RX 250

## 2022-09-01 PROCEDURE — 64493 INJ PARAVERT F JNT L/S 1 LEV: CPT | Mod: 50 | Performed by: PHYSICAL MEDICINE & REHABILITATION

## 2022-09-01 PROCEDURE — 255N000002 HC RX 255 OP 636: Performed by: PHYSICAL MEDICINE & REHABILITATION

## 2022-09-01 RX ORDER — BUPIVACAINE HYDROCHLORIDE 5 MG/ML
INJECTION, SOLUTION EPIDURAL; INTRACAUDAL
Status: COMPLETED
Start: 2022-09-01 | End: 2022-09-01

## 2022-09-01 RX ORDER — BUPIVACAINE HYDROCHLORIDE 5 MG/ML
5 INJECTION, SOLUTION EPIDURAL; INTRACAUDAL ONCE
Status: COMPLETED | OUTPATIENT
Start: 2022-09-01 | End: 2022-09-01

## 2022-09-01 RX ORDER — LIDOCAINE HYDROCHLORIDE 10 MG/ML
5 INJECTION, SOLUTION EPIDURAL; INFILTRATION; INTRACAUDAL; PERINEURAL ONCE
Status: COMPLETED | OUTPATIENT
Start: 2022-09-01 | End: 2022-09-01

## 2022-09-01 RX ORDER — IOPAMIDOL 408 MG/ML
10 INJECTION, SOLUTION INTRATHECAL ONCE
Status: COMPLETED | OUTPATIENT
Start: 2022-09-01 | End: 2022-09-01

## 2022-09-01 RX ORDER — LIDOCAINE HYDROCHLORIDE 10 MG/ML
INJECTION, SOLUTION EPIDURAL; INFILTRATION; INTRACAUDAL; PERINEURAL
Status: COMPLETED
Start: 2022-09-01 | End: 2022-09-01

## 2022-09-01 RX ADMIN — IOPAMIDOL 1.2 ML: 408 INJECTION, SOLUTION INTRATHECAL at 10:41

## 2022-09-01 RX ADMIN — LIDOCAINE HYDROCHLORIDE 1 ML: 10 INJECTION, SOLUTION EPIDURAL; INFILTRATION; INTRACAUDAL; PERINEURAL at 10:26

## 2022-09-01 RX ADMIN — BUPIVACAINE HYDROCHLORIDE 25 MG: 5 INJECTION, SOLUTION EPIDURAL; INTRACAUDAL; PERINEURAL at 10:41

## 2022-09-01 RX ADMIN — BUPIVACAINE HYDROCHLORIDE 25 MG: 5 INJECTION, SOLUTION EPIDURAL; INTRACAUDAL at 10:41

## 2022-09-01 NOTE — PROGRESS NOTES
Assisted MD Leger in image guided bilateral medial branch blocks of L3-L4 and L4-L5. Medications given per MAR. Pain prior to procedure 5/10 bilaterally. Pain post procedure 0/10 Bilaterally. Pt tolerated procedure well. Injection site covered with dressing. Dressing clean, dry and intact at time of discharge. Discharge instructions given and all questions answered. Pt left ambulatory in stable condition.

## 2022-09-05 ENCOUNTER — MYC MEDICAL ADVICE (OUTPATIENT)
Dept: ORTHOPEDICS | Facility: CLINIC | Age: 52
End: 2022-09-05

## 2022-09-06 DIAGNOSIS — M47.817 FACET ARTHROPATHY, LUMBOSACRAL: Primary | ICD-10-CM

## 2022-09-06 NOTE — TELEPHONE ENCOUNTER
On 09/01/2022, Ms. Carlie Brumfield had diagnostic medial branch blocks of the bilateral L3-L4 and L4-L5 facet joints.  Ms. Brumfield reported 80% to 100% improvement in her low back pain until about 4 hours after the injections.  Ms. Carlie Brumfield will be scheduled for confirmatory medial branch blocks of the bilateral L3-L4 and L4-L5 facet joints    Alpesh Talavera MD        To: Carlie Brumfield      From: Alpesh Talavera MD      Created: 9/6/2022 11:46 AM        Ms. Carlie Brumfield,     That is great news.  We will get you scheduled for confirmatory medial branch blocks of the bilateral L3-L4 and L4-L5 facet joints.     Alpesh Talavera MD            To: [facility]      From: Carlie SPENCE Octaviano      Created: 9/5/2022 7:02 PM        *-*-*This message was handled on 9/6/2022 11:46 AM by ALPESH TALAVERA*-*-*    Good morning. Attached is my pain diary for the procedure completed 9/1/2022.   Thank you,   Carlie Octaviano

## 2022-09-06 NOTE — PROGRESS NOTES
On 09/01/2022, Ms. Carlie Brumfield had diagnostic medial branch blocks of the bilateral L3-L4 and L4-L5 facet joints.  Ms. Brumfield reported 80% to 100% improvement in her low back pain until about 4 hours after the injections.  Ms. Carlie Brumfield will be scheduled for confirmatory medial branch blocks of the bilateral L3-L4 and L4-L5 facet joints    Alpesh Leger MD

## 2022-09-19 ENCOUNTER — TELEPHONE (OUTPATIENT)
Dept: INTERVENTIONAL RADIOLOGY/VASCULAR | Facility: CLINIC | Age: 52
End: 2022-09-19

## 2022-09-19 NOTE — TELEPHONE ENCOUNTER
Called and spoke with Pt regarding upcoming appointment with DR. Leger. PT denies questions regarding upcoming appointment. Given standard instructions.

## 2022-09-21 RX ORDER — IOPAMIDOL 408 MG/ML
10 INJECTION, SOLUTION INTRATHECAL ONCE
Status: COMPLETED | OUTPATIENT
Start: 2022-09-22 | End: 2022-09-22

## 2022-09-21 RX ORDER — LIDOCAINE HYDROCHLORIDE 10 MG/ML
5 INJECTION, SOLUTION EPIDURAL; INFILTRATION; INTRACAUDAL; PERINEURAL ONCE
Status: COMPLETED | OUTPATIENT
Start: 2022-09-22 | End: 2022-09-22

## 2022-09-21 RX ORDER — LIDOCAINE HYDROCHLORIDE 20 MG/ML
3 INJECTION, SOLUTION EPIDURAL; INFILTRATION; INTRACAUDAL; PERINEURAL ONCE
Status: COMPLETED | OUTPATIENT
Start: 2022-09-22 | End: 2022-09-22

## 2022-09-22 ENCOUNTER — HOSPITAL ENCOUNTER (OUTPATIENT)
Dept: GENERAL RADIOLOGY | Facility: CLINIC | Age: 52
Discharge: HOME | End: 2022-09-22
Attending: PHYSICAL MEDICINE & REHABILITATION | Admitting: PHYSICAL MEDICINE & REHABILITATION
Payer: COMMERCIAL

## 2022-09-22 VITALS
DIASTOLIC BLOOD PRESSURE: 41 MMHG | RESPIRATION RATE: 16 BRPM | HEART RATE: 58 BPM | SYSTOLIC BLOOD PRESSURE: 115 MMHG | OXYGEN SATURATION: 98 %

## 2022-09-22 DIAGNOSIS — M47.817 FACET ARTHROPATHY, LUMBOSACRAL: Primary | ICD-10-CM

## 2022-09-22 PROCEDURE — 64494 INJ PARAVERT F JNT L/S 2 LEV: CPT | Mod: 50 | Performed by: PHYSICAL MEDICINE & REHABILITATION

## 2022-09-22 PROCEDURE — 64493 INJ PARAVERT F JNT L/S 1 LEV: CPT | Mod: 50

## 2022-09-22 PROCEDURE — 255N000002 HC RX 255 OP 636: Performed by: PHYSICAL MEDICINE & REHABILITATION

## 2022-09-22 PROCEDURE — 64493 INJ PARAVERT F JNT L/S 1 LEV: CPT | Mod: 50 | Performed by: PHYSICAL MEDICINE & REHABILITATION

## 2022-09-22 PROCEDURE — 250N000009 HC RX 250

## 2022-09-22 RX ORDER — LIDOCAINE HYDROCHLORIDE 10 MG/ML
INJECTION, SOLUTION EPIDURAL; INFILTRATION; INTRACAUDAL; PERINEURAL
Status: COMPLETED
Start: 2022-09-22 | End: 2022-09-22

## 2022-09-22 RX ORDER — LIDOCAINE HYDROCHLORIDE 20 MG/ML
INJECTION, SOLUTION EPIDURAL; INFILTRATION; INTRACAUDAL; PERINEURAL
Status: COMPLETED
Start: 2022-09-22 | End: 2022-09-22

## 2022-09-22 RX ADMIN — LIDOCAINE HYDROCHLORIDE 3 ML: 20 INJECTION, SOLUTION EPIDURAL; INFILTRATION; INTRACAUDAL; PERINEURAL at 09:10

## 2022-09-22 RX ADMIN — LIDOCAINE HYDROCHLORIDE 2 ML: 10 INJECTION, SOLUTION EPIDURAL; INFILTRATION; INTRACAUDAL; PERINEURAL at 09:06

## 2022-09-22 RX ADMIN — IOPAMIDOL 1.2 ML: 408 INJECTION, SOLUTION INTRATHECAL at 09:07

## 2022-09-22 NOTE — PROGRESS NOTES
Assisted MD Leger in Bilateral medial branch blocks of L3-L4 and L4-L5 facet joints. Medications given per MAR. Pain prior to procedure 5/10 at lower back. Pain post procedure 2/10. Pt tolerated procedure well. Injection site covered with dressing. Dressing clean, dry and intact at time of discharge. Discharge instructions given and all questions answered. Pt left ambulatory in stable condition.

## 2022-09-25 DIAGNOSIS — E05.00 GRAVES DISEASE: ICD-10-CM

## 2022-09-26 ENCOUNTER — MYC MEDICAL ADVICE (OUTPATIENT)
Dept: ORTHOPEDICS | Facility: CLINIC | Age: 52
End: 2022-09-26

## 2022-09-26 ENCOUNTER — TELEPHONE (OUTPATIENT)
Dept: ORTHOPEDICS | Facility: CLINIC | Age: 52
End: 2022-09-26

## 2022-09-26 DIAGNOSIS — M47.817 FACET ARTHROPATHY, LUMBOSACRAL: Primary | ICD-10-CM

## 2022-09-26 RX ORDER — LEVOTHYROXINE SODIUM 150 UG/1
TABLET ORAL
Qty: 90 TABLET | Refills: 2 | Status: SHIPPED | OUTPATIENT
Start: 2022-09-26 | End: 2023-09-11

## 2022-09-26 NOTE — TELEPHONE ENCOUNTER
On 09/01/2022, Ms. Carlie Brumfield had diagnostic medial branch blocks of the bilateral L3-L4 and L4-L5 facet joints.  Ms. Brumfield reported 80% to 100% improvement in her low back pain until about 4 hours after the injections.    On 09/22/2022, Ms. Carlie Brumfield had confirmatory medial branch blocks of the bilateral L3-L4 and L4-L5 facet joints.  Ms. Brumfield reported 80% to 100% improvement in her low back pain through 6 hours after the injections.    Ms. Carlie Brumfield will be scheduled for radiofrequency ablations / neurotomies of the right L3-L4 and L4-L5 facet joints and will be scheduled for radiofrequency ablations / neurotomies of the left L3-L4 and L4-L5 facet joints.    Alpesh Talavera MD        To: Carlie SPENCE Octaviano      From: Alpesh Talavera MD      Created: 9/26/2022 12:17 PM        Ms. Carlie Brumfield,     That is great news.  We will get you scheduled for radiofrequency ablations / neurotomies of the right L3-L4 and L4-L5 facet joints and for radiofrequency ablations / neurotomies of the left L3-L4 and L4-L5 facet joints.     Alpesh Talavera MD            To: [facility]      From: Carlie Lloydannery      Created: 9/26/2022 10:46 AM        *-*-*This message was handled on 9/26/2022 12:17 PM by ALPESH TALAVERA*-*-*    Pain diary attached for Carlie LloydOctaviano.

## 2022-09-26 NOTE — PROGRESS NOTES
On 09/01/2022, Ms. Carlie Brumfield had diagnostic medial branch blocks of the bilateral L3-L4 and L4-L5 facet joints.  Ms. Brumfield reported 80% to 100% improvement in her low back pain until about 4 hours after the injections.    On 09/22/2022, Ms. Carlie Brumfield had confirmatory medial branch blocks of the bilateral L3-L4 and L4-L5 facet joints.  Ms. Brumfield reported 80% to 100% improvement in her low back pain through 6 hours after the injections.    Ms. Carlie Brumfield will be scheduled for radiofrequency ablations / neurotomies of the right L3-L4 and L4-L5 facet joints and will be scheduled for radiofrequency ablations / neurotomies of the left L3-L4 and L4-L5 facet joints.    Alpesh Leger MD

## 2022-09-26 NOTE — TELEPHONE ENCOUNTER
Prescription approved per [facility] Refill Protocol.  George BRAY RN, BSN

## 2022-09-26 NOTE — TELEPHONE ENCOUNTER
Patient is scheduled for procedure with Dr. Leger    Spoke with: patient    Date of Procedure: 10-05-22 // 10-12-22    Location: [facility]    Informed patient they will need an adult  yes    Pre-procedure COVID-19 Test: @ home    Additional comments: Arrival @ 11:40 am and 1 pm    Patient is aware pre-op RN will call 2-3 days prior to procedure with arrival time and instructions. yes      Natasha Streeter on 9/26/2022 at 2:05 PM

## 2022-10-03 ENCOUNTER — MYC MEDICAL ADVICE (OUTPATIENT)
Dept: SURGERY | Facility: AMBULATORY SURGERY CENTER | Age: 52
End: 2022-10-03

## 2022-10-03 NOTE — TELEPHONE ENCOUNTER
To: [facility]      From: Carlie Brumfield      Created: 10/3/2022 4:15 PM        *-*-*This message has not been handled.*-*-*    Perfect. See you Wednesday :}  Thanks,  Carlie Brumfield             To: Carlie Brumfield      From: Alpesh Talavera MD      Created: 10/3/2022 4:13 PM        Ms. Carlie Brumfield,     It looks like it has been improved.     Alpesh Talavera MD            To: [facility]      From: Carlie Brumfield      Created: 10/3/2022 3:17 PM        *-*-*This message was handled on 10/3/2022 4:13 PM by ALPESH TALAVERA K*-*-*    I just want to verify this procedure has been approved. I received a call last week stating that this was being sent in to be approved but sometimes the insurance companies need to see PT attempted before they approve the procedure.  Thanks,  Carlie Brumfield

## 2022-10-05 ENCOUNTER — HOSPITAL ENCOUNTER (OUTPATIENT)
Facility: AMBULATORY SURGERY CENTER | Age: 52
Discharge: HOME | End: 2022-10-05
Attending: PHYSICAL MEDICINE & REHABILITATION | Admitting: PHYSICAL MEDICINE & REHABILITATION
Payer: COMMERCIAL

## 2022-10-05 VITALS
SYSTOLIC BLOOD PRESSURE: 134 MMHG | HEART RATE: 48 BPM | TEMPERATURE: 97.6 F | DIASTOLIC BLOOD PRESSURE: 87 MMHG | RESPIRATION RATE: 16 BRPM | OXYGEN SATURATION: 100 % | WEIGHT: 164 LBS | BODY MASS INDEX: 26.36 KG/M2 | HEIGHT: 66 IN

## 2022-10-05 DIAGNOSIS — M47.817 FACET ARTHROPATHY, LUMBOSACRAL: Primary | ICD-10-CM

## 2022-10-05 PROCEDURE — 64636 DESTROY L/S FACET JNT ADDL: CPT | Mod: RT

## 2022-10-05 PROCEDURE — 64635 DESTROY LUMB/SAC FACET JNT: CPT | Mod: RT

## 2022-10-05 RX ORDER — LIDOCAINE HYDROCHLORIDE 40 MG/ML
INJECTION, SOLUTION RETROBULBAR PRN
Status: DISCONTINUED | OUTPATIENT
Start: 2022-10-05 | End: 2022-10-05 | Stop reason: HOSPADM

## 2022-10-05 RX ORDER — DIAZEPAM 5 MG
5 TABLET ORAL ONCE
Status: COMPLETED | OUTPATIENT
Start: 2022-10-05 | End: 2022-10-05

## 2022-10-05 RX ORDER — LIDOCAINE HYDROCHLORIDE 10 MG/ML
INJECTION, SOLUTION EPIDURAL; INFILTRATION; INTRACAUDAL; PERINEURAL PRN
Status: DISCONTINUED | OUTPATIENT
Start: 2022-10-05 | End: 2022-10-05 | Stop reason: HOSPADM

## 2022-10-05 RX ORDER — DIAZEPAM 5 MG
5 TABLET ORAL ONCE
Status: DISCONTINUED | OUTPATIENT
Start: 2022-10-05 | End: 2022-10-06 | Stop reason: HOSPADM

## 2022-10-05 RX ADMIN — DIAZEPAM 5 MG: 5 TABLET ORAL at 12:56

## 2022-10-05 NOTE — DISCHARGE INSTRUCTIONS
Home Care Instructions after a Radio Frequency Ablation      Activity  -Rest today  -Do not work today  -Resume normal activity in 2-3 days  -No heavy lifting, turning or twisting for 24 hours  -DO NOT shower or soak in tub for 24 hours  -DO NOT remove bandaid for 24 hours    Pain  -You may experience soreness at the injection site for one or two days  -You may use an ice pack for 20 minutes every 2 hours for the first 24 hours, longer if needed for comfort, do not use heat  -You may use Tylenol (acetaminophen) every 4 hours or other pain medicines as directed by your physician  -If other pain medications are prescribed by your physician, please follow dosing instructions carefully.    What to expect  You may experience numbness radiating into your legs or arms (depending on the procedure location). This numbness may last several hours. Until sensation returns to normal, please use caution in walking, climbing stairs, and stepping out of your vehicle, etc. Relief of your initial symptoms can take up to 4 weeks to feel better, this is normal and due to the healing process. The procedure site may feel like a deep burn. Using ice will greatly minimize this discomfort. Do not use numbing creams or patches over your injection sites immediately following your procedure. Please keep injection sites covered, clean and dry for 24 hours.      Please contact us if you have:  -Severe pain  -Fever more than 101.5 degrees Fahrenheit  -Signs of infection at the injection site (redness, swelling, or drainage)    If you have questions, please contact our office at [phone] Option #1 between the hours of 7:00 am and 3:00 pm Monday through Friday. After office hours you can contact the on call provider by dialing [phone]. If you need immediate attention, we recommend that you go to a hospital emergency room or dial [phone].    If you have procedure scheduling questions please call [phone] Option #2    If you are a PM&R  patient, please call [phone] for questions

## 2022-10-05 NOTE — PROCEDURES
PHYSICAL MEDICINE & REHABILITATION / MEDICAL SPINE      PROCEDURE DATE:  10/05/2022      PATIENT NAME:  Carlie Brumfield  MRN:  [id number]  YOB: 1970      PRE-PROCEDURE DIAGNOSIS:  1. Facet arthropathy, lumbosacral        POST-PROCEDURE DIAGNOSIS:  1. Facet arthropathy, lumbosacral        PROCEDURE:  Fluoroscopic-guided medial branch radiofrequency ablations / neurotomies of the right L3-L4 and L4-L5 facet joints.  (CPT code/s:  62985, 91303)      INDICATIONS FOR PROCEDURE:   Ms. Carlie Brumfield is a 52 year old female with a clinical picture consistent with right lumbosacral facet arthropathy as determined after two rounds of medial branch blocks with local anesthetic, extensive conversation with the patient, and evaluation of the patient's pain diaries after each medial branch block.      PROCEDURE IN DETAIL:  Prior to the procedure, educational material was provided for the patient to review and take home.  After a discussion of the risks, benefits, and alternatives to the procedure, the patient expressed understanding and wished to proceed.  Consent form was signed.  The patient was brought to the fluoroscopy suite and placed in the prone position.  Procedural pause was conducted to verify:  patient identity, procedure to be performed, laterality, site, and patient position.    The skin was sterilely prepped using chlorhexidine and allowed to dry.  The skin was draped in the usual sterile fashion.  After identifying the junctions of the transverse processes and superior articular processes for the medial branches of the right L3-L4 and L4-L5 facet joints with fluoroscopy, the skin was infiltrated with 2.5 ml 1% preservative-free lidocaine at each level.  Then, 18-gauge, 150 mm SMK needles with 10 mm active tips were advanced to the junctions of the transverse processes and superior articular processes using fluoroscopic guidance.  Proper needle positioning was confirmed using multiple fluoroscopic  views.      Next, sensory stimulation was performed at 50 Hz and up to 1 V with reproduction of sensation at 0.4 V.  Motor stimulation was then performed at 2 Hz and up to 2 V without any lower extremity stimulation observed (which was confirmed by the patient's self report).  Once the SMK needle position was confirmed and after negative aspiration, 0.5 ml 4% preservative-free lidocaine was injected at each level.    Radiofrequency ablation / neurotomy lesioning was carried out utilizing the following parameters:  80 C for 90 seconds.  After the first lesioning was completed, the SMK needles were rotated / withdrawn and noted to be still in the correct positions at the corresponding levels.  The second lesioning was then carried out at these positions utilizing the same parameters:  80 C for 90 seconds.  Following lesioning, the needles were removed.  The needle insertion sites were dressed appropriately.     The patient tolerated the procedure well, and there were no apparent complications.  The patient was escorted back to the postprocedure room.  The patient was monitored for side effects.  No reactions were noted.  After appropriate observation, the patient was dismissed from the postprocedure room in good condition.    Preprocedure pain level: 4/10.  Postprocedure pain level: 3/10.      Alpesh Leger MD

## 2022-10-12 ENCOUNTER — ANCILLARY PROCEDURE (OUTPATIENT)
Dept: RADIOLOGY | Facility: AMBULATORY SURGERY CENTER | Age: 52
End: 2022-10-12
Attending: PHYSICAL MEDICINE & REHABILITATION
Payer: COMMERCIAL

## 2022-10-12 ENCOUNTER — HOSPITAL ENCOUNTER (OUTPATIENT)
Facility: AMBULATORY SURGERY CENTER | Age: 52
Discharge: HOME | End: 2022-10-12
Attending: PHYSICAL MEDICINE & REHABILITATION | Admitting: PHYSICAL MEDICINE & REHABILITATION
Payer: COMMERCIAL

## 2022-10-12 VITALS
BODY MASS INDEX: 26.47 KG/M2 | HEART RATE: 47 BPM | SYSTOLIC BLOOD PRESSURE: 112 MMHG | OXYGEN SATURATION: 100 % | RESPIRATION RATE: 16 BRPM | WEIGHT: 164 LBS | DIASTOLIC BLOOD PRESSURE: 69 MMHG | TEMPERATURE: 97.8 F

## 2022-10-12 DIAGNOSIS — R52 PAIN: ICD-10-CM

## 2022-10-12 DIAGNOSIS — M47.817 FACET ARTHROPATHY, LUMBOSACRAL: Primary | ICD-10-CM

## 2022-10-12 PROCEDURE — 64635 DESTROY LUMB/SAC FACET JNT: CPT | Mod: LT

## 2022-10-12 PROCEDURE — 64636 DESTROY L/S FACET JNT ADDL: CPT | Mod: LT

## 2022-10-12 RX ORDER — LIDOCAINE HYDROCHLORIDE 10 MG/ML
INJECTION, SOLUTION EPIDURAL; INFILTRATION; INTRACAUDAL; PERINEURAL DAILY PRN
Status: DISCONTINUED | OUTPATIENT
Start: 2022-10-12 | End: 2022-10-12 | Stop reason: HOSPADM

## 2022-10-12 RX ORDER — LIDOCAINE HYDROCHLORIDE 40 MG/ML
INJECTION, SOLUTION RETROBULBAR DAILY PRN
Status: DISCONTINUED | OUTPATIENT
Start: 2022-10-12 | End: 2022-10-12 | Stop reason: HOSPADM

## 2022-10-12 RX ORDER — DIAZEPAM 5 MG
5 TABLET ORAL ONCE
Status: COMPLETED | OUTPATIENT
Start: 2022-10-12 | End: 2022-10-12

## 2022-10-12 RX ADMIN — DIAZEPAM 5 MG: 5 TABLET ORAL at 13:30

## 2022-10-12 NOTE — PROCEDURES
PHYSICAL MEDICINE & REHABILITATION / MEDICAL SPINE      PROCEDURE DATE:  10/12/2022      PATIENT NAME:  Carlie Brumfield  MRN:  [id number]  YOB: 1970      PRE-PROCEDURE DIAGNOSIS:  1. Facet arthropathy, lumbosacral        POST-PROCEDURE DIAGNOSIS:  1. Facet arthropathy, lumbosacral        PROCEDURE:  Fluoroscopic-guided medial branch radiofrequency ablations / neurotomies of the left L3-L4 and L4-L5 facet joints.  (CPT code/s:  80844, 27169)      INDICATIONS FOR PROCEDURE:   Ms. Carlie Brumfield is a 52 year old female with a clinical picture consistent with left lumbosacral facet arthropathy as determined after two rounds of medial branch blocks with local anesthetic, extensive conversation with the patient, and evaluation of the patient's pain diaries after each medial branch block.      PROCEDURE IN DETAIL:  Prior to the procedure, educational material was provided for the patient to review and take home.  After a discussion of the risks, benefits, and alternatives to the procedure, the patient expressed understanding and wished to proceed.  Consent form was signed.  The patient was brought to the fluoroscopy suite and placed in the prone position.  Procedural pause was conducted to verify:  patient identity, procedure to be performed, laterality, site, and patient position.    The skin was sterilely prepped using chlorhexidine and allowed to dry.  The skin was draped in the usual sterile fashion.  After identifying the junctions of the transverse processes and superior articular processes for the medial branches rami of the left L3-L4 and L4-L5 facet joints with fluoroscopy, the skin was infiltrated with 2 ml 1% preservative-free lidocaine at each level.  Then, 18-gauge, 150 mm SMK needles with 10 mm active tips were advanced to the junctions of the transverse processes and superior articular processes using fluoroscopic guidance.  Proper needle positioning was confirmed using multiple fluoroscopic  views.      Next, sensory stimulation was performed at 50 Hz and up to 1 V with reproduction of sensation at 0.3 V.  Motor stimulation was then performed at 2 Hz and up to 2 V without any lower extremity stimulation observed (which was confirmed by the patient's self report).  Once the SMK needle position was confirmed and after negative aspiration, 0.5 ml 4% preservative-free lidocaine was injected at each level.    Radiofrequency ablation / neurotomy lesioning was carried out utilizing the following parameters:  80 C for 90 seconds.  After the first lesioning was completed, the SMK needles were rotated and noted to be still in the correct positions at the corresponding levels.  The second lesioning was then carried out at these positions utilizing the same parameters:  80 C for 90 seconds.  Following lesioning, the needles were removed.  The needle insertion sites were dressed appropriately.     The patient tolerated the procedure well, and there were no apparent complications.  The patient was escorted back to the postprocedure room.  The patient was monitored for side effects.  No reactions were noted.  After appropriate observation, the patient was dismissed from the postprocedure room in good condition.    Preprocedure pain level: 4/10.  Postprocedure pain level: 3/10.      Alpesh Leger MD

## 2022-10-12 NOTE — DISCHARGE INSTRUCTIONS
Home Care Instructions after a Radio Frequency Ablation      Activity  -Rest today  -Do not work today  -Resume normal activity in 2-3 days  -No heavy lifting, turning or twisting for 24 hours  -DO NOT shower or soak in tub for 24 hours  -DO NOT remove bandaid for 24 hours    Pain  -You may experience soreness at the injection site for one or two days  -You may use an ice pack for 20 minutes every 2 hours for the first 24 hours, longer if needed for comfort, do not use heat  -You may use Tylenol (acetaminophen) every 4 hours or other pain medicines as directed by your physician  -If other pain medications are prescribed by your physician, please follow dosing instructions carefully.    What to expect  You may experience numbness radiating into your legs or arms (depending on the procedure location). This numbness may last several hours. Until sensation returns to normal, please use caution in walking, climbing stairs, and stepping out of your vehicle, etc. Relief of your initial symptoms can take up to 4 weeks to feel better, this is normal and due to the healing process. The procedure site may feel like a deep burn. Using ice will greatly minimize this discomfort. Do not use numbing creams or patches over your injection sites immediately following your procedure. Please keep injection sites covered, clean and dry for 24 hours.        Please contact us if you have:  -Severe pain  -Fever more than 101.5 degrees Fahrenheit  -Signs of infection at the injection site (redness, swelling, or drainage)    If you have questions, please contact our office at [phone] Option #1 between the hours of 7:00 am and 3:00 pm Monday through Friday. After office hours you can contact the on call provider by dialing [phone]. If you need immediate attention, we recommend that you go to a hospital emergency room or dial [phone].    If you have procedure scheduling questions please call [phone] Option #2    If you are a PM&R  patient, please call [phone] for questions

## 2022-10-13 ENCOUNTER — OFFICE VISIT (OUTPATIENT)
Dept: NEUROLOGY | Facility: CLINIC | Age: 52
End: 2022-10-13
Attending: INTERNAL MEDICINE
Payer: COMMERCIAL

## 2022-10-13 ENCOUNTER — TELEPHONE (OUTPATIENT)
Dept: ORTHOPEDICS | Facility: CLINIC | Age: 52
End: 2022-10-13

## 2022-10-13 VITALS
WEIGHT: 160 LBS | HEART RATE: 82 BPM | OXYGEN SATURATION: 99 % | BODY MASS INDEX: 25.71 KG/M2 | HEIGHT: 66 IN | SYSTOLIC BLOOD PRESSURE: 95 MMHG | DIASTOLIC BLOOD PRESSURE: 68 MMHG

## 2022-10-13 DIAGNOSIS — R27.9 INCOORDINATION: ICD-10-CM

## 2022-10-13 DIAGNOSIS — R42 DIZZINESS: Primary | ICD-10-CM

## 2022-10-13 PROCEDURE — 99205 OFFICE O/P NEW HI 60 MIN: CPT | Performed by: PSYCHIATRY & NEUROLOGY

## 2022-10-13 NOTE — LETTER
10/13/2022         RE: Carlie Brumfield  [address] [address] [facility] [address]        Dear Colleague,    Thank you for referring your patient, Carlie Brumfield, to the [facility]. Please see a copy of my visit note below.    INITIAL NEUROLOGY CONSULTATION    DATE OF VISIT: 10/13/2022  CLINIC LOCATION: [facility]  MRN: [id number]  PATIENT NAME: Carlie Brumfield  YOB: 1970    REASON FOR VISIT:   Chief Complaint   Patient presents with     Neurologic Problem     Incoordination and dizziness      HISTORY OF PRESENT ILLNESS:                                                    Ms. Carlie Brumfield is 52 year old right handed female patient with past medical history of hypothyroidism, hyperlipidemia, meningitis (2001), anemia, and obesity, status post gastric bypass, who was seen today for dizziness/incoordination.    Per patient's report, over the last several years the patient developed intermittent positional dizziness/lightheadedness that she describes as wooziness with intermittent spinning.  At the present time, it occurs 4-5 times per week lasting for seconds.  Usually associated with change in position from lying or sitting to standing or quick walking.  It never happened while sitting or lying in bed.  She estimates that she drinks about 2 cups of water and 60 to 80 ounces of coffee per day.  She denies prior history of significant head injuries, seizures, CNS infections, and strokes.  She has iron deficiency anemia, but states that only takes iron every other day due to stomach irritation.    Laboratory valuation from June-July 2022 includes low ferritin (6)/iron (22), normal TSH (3.71), low hemoglobin (10.8) with low MCV of 75 and elevated RDW of 18.4.    Brain MRI with and without contrast from 7/2/2022 was unremarkable, except scattered non-specific T2 hyperintensities felt consistent with mild chronic microvascular ischemic changes.   "Lumbar spine MRI demonstrated multilevel degenerative disc disease without significant degree of central canal or neuroforaminal narrowing.  Thoracic spine MRI from 8/2/2022 demonstrated moderate foraminal narrowing at T9 and T10 on the left.  Cervical spine MRI without contrast from the same date demonstrated mild degenerative changes without significant degree of spinal canal or neuroforaminal narrowing.  All images were personally reviewed and independently interpreted.    No additional useful information is available in Care Everywhere, which was reviewed.  PAST MEDICAL/SURGICAL HISTORY:                                                    I personally reviewed patient's past medical and surgical history with the patient at today's visit.  MEDICATIONS:                                                    I personally reviewed patient's medications and allergies with the patient at today's visit.  ALLERGIES:                                                      Allergies   Allergen Reactions     No Known Allergies      EXAM:                                                    VITAL SIGNS:   Ht 1.676 m (5' 6\")   Wt 72.6 kg (160 lb)   LMP 06/02/2011   SpO2 99%   BMI 25.82 kg/m     Orthostatic vital signs:  Vitals:    10/13/22 0945 10/13/22 0947 10/13/22 0948   BP: 126/77 118/68 95/68   BP Location: Left arm Left arm Left arm   Patient Position: Supine Sitting Standing   Cuff Size: Adult Large Adult Large Adult Large   Pulse: 80 69 82   SpO2: 97% 99% 99%   Weight: 72.6 kg (160 lb)     Height: 1.676 m (5' 6\")     .  Mini-Cog Assessment:  Mini Cog Assessment  Clock Draw Score: 2 Normal  3 Item Recall: 2 objects recalled  Mini Cog Total Score: 4  Administered by: : Lucia Franks (MA)    General: pt is in NAD, cooperative.  Skin: normal turgor, moist mucous membranes, no lesions/rashes noticed.  HEENT: ATNC, EOMI, PERRL, white sclera, normal conjunctiva, no nystagmus or ptosis. No carotid bruits " bilaterally.  Respiratory: lung sounds clear to auscultation bilaterally, no crackles, wheezes, rhonchi. Symmetric lung excursion, no accessory respiratory muscle use.  Cardiovascular: normal S1/S2, no murmurs/rubs/gallops.   Abdomen: Not distended.  : deferred.    Neurological:  Mental: alert, follows commands, Mini Cog Total Score: 4/5 with 2/3 on memory recall, no aphasia or dysarthria. Fund of knowledge is appropriate for age.  Cranial Nerves:  CN II: visual acuity - able to accurately count fingers with each eye. Visual fields intact, fundi: discs sharp, no papilledema and normal vessels bilaterally.  CN III, IV, VI: EOM intact, pupils equal and reactive  CN V: facial sensation nl  CN VII: face symmetric, no facial droop  CN VIII: hearing normal.  Head impulse tests and Lynn-Hallpike maneuvers are negative bilaterally.  CN IX: palate elevation symmetric, uvula at midline  CN XI SCM normal, shoulder shrug nl  CN XII: tongue midline  Motor: Strength: 5/5 in all major groups of all extremities. Normal tone. No abnormal movements. No pronator drift b/l.  Reflexes: Triceps, biceps, brachioradialis, patellar, and achilles reflexes normal and symmetric. No clonus noted. Toes are down-going b/l.   Sensory: light touch, pinprick, and vibration intact. Romberg: negative.  Coordination: FNF and heel-shin tests intact b/l.   Gait:  Normal, able to tandem walk without difficulty.  DATA:   LABS/EEG/IMAGING/OTHER STUDIES: I reviewed pertinent medical records, as detailed in the history of present illness.  ASSESSMENT AND PLAN:      ASSESSMENT: Carlie Brumfield is a 52 year old female patient with listed above past medical history, who presents with intermittent positional dizziness/lightheadedness for the last several years in context of reduced fluid intake and iron deficiency anemia.    We had a detailed discussion with the patient regarding her presenting complaints.  The neurological exam today is non-focal, including  vestibular testing.  Orthostatic testing today demonstrated 31 mmHg drop between lying and standing, which is suggestive of orthostatic hypotension.  I discussed this finding with the patient suggesting that she needs to increase her fluid intake while reducing coffee.  She also has iron deficiency anemia that might be contributory to her lightheadedness.  At the same time, I do not see any neurological causes of dizziness and would not recommend any additional work-up.    DIAGNOSES:    ICD-10-CM    1. Dizziness  R42 Adult Neurology  Referral      2. Incoordination  R27.9 Adult Neurology  Referral        PLAN: At today's visit we thoroughly discussed various diagnostic possibilities for patient's symptoms, results of the performed work-up, and the plan.    I advised the patient to gradually reduce her daily coffee intake while increasing fluid (water) intake to equal 64 fluid ounces per day.  We also need to treat her iron deficiency anemia adequately to see if the combination of these 2 interventions help.  If she continues to feel lightheaded after that, cardiac evaluation might be considered.    Next follow-up appointment is on as needed basis.    Total Time: 63 minutes spent on the date of the encounter doing chart review, history and exam, documentation and further activities per the note.    Chito Younger MD  [facility] Neurology  (Chart documentation was completed in part with Dragon voice-recognition software. Even though reviewed, some grammatical, spelling, and word errors may remain.)            Again, thank you for allowing me to participate in the care of your patient.        Sincerely,        Chito Younger MD

## 2022-10-13 NOTE — PATIENT INSTRUCTIONS
AFTER VISIT SUMMARY (AVS):    At today's visit we thoroughly discussed various diagnostic possibilities for your symptoms, results of the performed work-up, and the plan.    I would advise you to gradually reduce your daily coffee intake while increasing fluid (water) intake to equal 64 fluid ounces per day.  You also need to adequately treat your iron deficiency anemia to see if the combination of these 2 interventions help.  If you still continue to feel lightheaded, cardiac evaluation might be considered.    Next follow-up appointment is on as needed basis.    Please do not hesitate to call me with any questions or concerns.    Thanks.

## 2022-10-13 NOTE — PROGRESS NOTES
INITIAL NEUROLOGY CONSULTATION    DATE OF VISIT: 10/13/2022  CLINIC LOCATION: [facility]  MRN: [id number]  PATIENT NAME: Carlie Brumfield  YOB: 1970    REASON FOR VISIT: No chief complaint on file.    HISTORY OF PRESENT ILLNESS:                                                    Ms. Carlie Brumfield is 52 year old right handed female patient with past medical history of hypothyroidism, hyperlipidemia, meningitis (2001), anemia, and obesity, status post gastric bypass, who was seen today for dizziness/incoordination.    Per patient's report, ***.    Laboratory valuation from June-July 2022 includes low ferritin (6)/iron (22), normal TSH (3.71), low hemoglobin 10.8) with low MCV of 75 and elevated RDW of 18.4.    Brain MRI with and without contrast from 7/2/2022 was unremarkable, except scattered nonspecific T2 hyperintensities felt consistent with mild chronic microvascular ischemic changes.  Lumbar spine MRI demonstrated multilevel degenerative disc disease without significant degree of central canal or neuroforaminal narrowing.  Thoracic spine MRI from 8/2/2022 demonstrated moderate foraminal narrowing at T9 and T10 on the left.  Cervical spine MRI without contrast from the same date demonstrated mild degenerative changes without significant degree of spinal canal or neuroforaminal narrowing.  All images were personally reviewed and independently interpreted.    No additional useful information is available in Care Everywhere, which was reviewed.  PAST MEDICAL/SURGICAL HISTORY:                                                    I personally reviewed patient's past medical and surgical history with the patient at today's visit.  MEDICATIONS:                                                    I personally reviewed patient's medications and allergies with the patient at today's visit.  ALLERGIES:                                                      Allergies   Allergen Reactions     No  Patient called wanting to start cortizone shots with Dr. Howard.  Please call patient to discuss.  Thank you   Known Allergies      EXAM:                                                    VITAL SIGNS:   LMP 06/02/2011   Mini-Cog Assessment:       General: pt is in NAD, cooperative.  Skin: normal turgor, moist mucous membranes, no lesions/rashes noticed.  HEENT: ATNC, EOMI, PERRL, white sclera, normal conjunctiva, no nystagmus or ptosis. No carotid bruits bilaterally.  Respiratory: lung sounds clear to auscultation bilaterally, no crackles, wheezes, rhonchi. Symmetric lung excursion, no accessory respiratory muscle use.  Cardiovascular: normal S1/S2, no murmurs/rubs/gallops.   Abdomen: Not distended.  : deferred.    Neurological:  Mental: alert, follows commands,  /5 with ***/3 on memory recall, no aphasia or dysarthria. Fund of knowledge is {MYAPPROPRIATE:225451}  Cranial Nerves:  CN II: visual acuity - able to accurately count fingers with each eye. Visual fields intact, fundi: discs sharp, no papilledema and normal vessels bilaterally.  CN III, IV, VI: EOM intact, pupils equal and reactive  CN V: facial sensation nl  CN VII: face symmetric, no facial droop  CN VIII: hearing normal  CN IX: palate elevation symmetric, uvula at midline  CN XI SCM normal, shoulder shrug nl  CN XII: tongue midline  Motor: Strength: 5/5 in all major groups of all extremities. Normal tone. No abnormal movements. No pronator drift b/l.  Reflexes: Triceps, biceps, brachioradialis, patellar, and achilles reflexes normal and symmetric. No clonus noted. Toes are down-going b/l.   Sensory: light touch, pinprick, and vibration intact. Romberg: negative.  Coordination: FNF and heel-shin tests intact b/l.   Gait:  Normal, able to tandem walk *** without difficulty.  DATA:   LABS/EEG/IMAGING/OTHER STUDIES: I reviewed pertinent medical records, as detailed in the history of present illness.  ASSESSMENT AND PLAN:      ASSESSMENT: Carlie Brumfield is a 52 year old female patient with listed above past medical history, who presents with ***.    We had a  detailed discussion with the patient regarding her presenting complaints.  The neurological exam today is ***.    DIAGNOSES:  No diagnosis found.  PLAN: There are no Patient Instructions on file for this visit.    Total Time: *** minutes spent on the date of the encounter doing chart review, history and exam, documentation and further activities per the note.    Chito Younger MD  [facility] Neurology  (Chart documentation was completed in part with Dragon voice-recognition software. Even though reviewed, some grammatical, spelling, and word errors may remain.)

## 2022-10-13 NOTE — PROGRESS NOTES
INITIAL NEUROLOGY CONSULTATION    DATE OF VISIT: 10/13/2022  CLINIC LOCATION: [facility]  MRN: [id number]  PATIENT NAME: Carlie Brumfield  YOB: 1970    REASON FOR VISIT:   Chief Complaint   Patient presents with     Neurologic Problem     Incoordination and dizziness      HISTORY OF PRESENT ILLNESS:                                                    Ms. Carlie Brumfield is 52 year old right handed female patient with past medical history of hypothyroidism, hyperlipidemia, meningitis (2001), anemia, and obesity, status post gastric bypass, who was seen today for dizziness/incoordination.    Per patient's report, over the last several years the patient developed intermittent positional dizziness/lightheadedness that she describes as wooziness with intermittent spinning.  At the present time, it occurs 4-5 times per week lasting for seconds.  Usually associated with change in position from lying or sitting to standing or quick walking.  It never happened while sitting or lying in bed.  She estimates that she drinks about 2 cups of water and 60 to 80 ounces of coffee per day.  She denies prior history of significant head injuries, seizures, CNS infections, and strokes.  She has iron deficiency anemia, but states that only takes iron every other day due to stomach irritation.    Laboratory valuation from June-July 2022 includes low ferritin (6)/iron (22), normal TSH (3.71), low hemoglobin (10.8) with low MCV of 75 and elevated RDW of 18.4.    Brain MRI with and without contrast from 7/2/2022 was unremarkable, except scattered non-specific T2 hyperintensities felt consistent with mild chronic microvascular ischemic changes.  Lumbar spine MRI demonstrated multilevel degenerative disc disease without significant degree of central canal or neuroforaminal narrowing.  Thoracic spine MRI from 8/2/2022 demonstrated moderate foraminal narrowing at T9 and T10 on the left.  Cervical spine MRI without  "contrast from the same date demonstrated mild degenerative changes without significant degree of spinal canal or neuroforaminal narrowing.  All images were personally reviewed and independently interpreted.    No additional useful information is available in Care Everywhere, which was reviewed.  PAST MEDICAL/SURGICAL HISTORY:                                                    I personally reviewed patient's past medical and surgical history with the patient at today's visit.  MEDICATIONS:                                                    I personally reviewed patient's medications and allergies with the patient at today's visit.  ALLERGIES:                                                      Allergies   Allergen Reactions     No Known Allergies      EXAM:                                                    VITAL SIGNS:   Ht 1.676 m (5' 6\")   Wt 72.6 kg (160 lb)   LMP 06/02/2011   SpO2 99%   BMI 25.82 kg/m     Orthostatic vital signs:  Vitals:    10/13/22 0945 10/13/22 0947 10/13/22 0948   BP: 126/77 118/68 95/68   BP Location: Left arm Left arm Left arm   Patient Position: Supine Sitting Standing   Cuff Size: Adult Large Adult Large Adult Large   Pulse: 80 69 82   SpO2: 97% 99% 99%   Weight: 72.6 kg (160 lb)     Height: 1.676 m (5' 6\")     .  Mini-Cog Assessment:  Mini Cog Assessment  Clock Draw Score: 2 Normal  3 Item Recall: 2 objects recalled  Mini Cog Total Score: 4  Administered by: : Lucia Franks (MA)    General: pt is in NAD, cooperative.  Skin: normal turgor, moist mucous membranes, no lesions/rashes noticed.  HEENT: ATNC, EOMI, PERRL, white sclera, normal conjunctiva, no nystagmus or ptosis. No carotid bruits bilaterally.  Respiratory: lung sounds clear to auscultation bilaterally, no crackles, wheezes, rhonchi. Symmetric lung excursion, no accessory respiratory muscle use.  Cardiovascular: normal S1/S2, no murmurs/rubs/gallops.   Abdomen: Not distended.  : deferred.    Neurological:  Mental: " alert, follows commands, Mini Cog Total Score: 4/5 with 2/3 on memory recall, no aphasia or dysarthria. Fund of knowledge is appropriate for age.  Cranial Nerves:  CN II: visual acuity - able to accurately count fingers with each eye. Visual fields intact, fundi: discs sharp, no papilledema and normal vessels bilaterally.  CN III, IV, VI: EOM intact, pupils equal and reactive  CN V: facial sensation nl  CN VII: face symmetric, no facial droop  CN VIII: hearing normal.  Head impulse tests and Lynn-Hallpike maneuvers are negative bilaterally.  CN IX: palate elevation symmetric, uvula at midline  CN XI SCM normal, shoulder shrug nl  CN XII: tongue midline  Motor: Strength: 5/5 in all major groups of all extremities. Normal tone. No abnormal movements. No pronator drift b/l.  Reflexes: Triceps, biceps, brachioradialis, patellar, and achilles reflexes normal and symmetric. No clonus noted. Toes are down-going b/l.   Sensory: light touch, pinprick, and vibration intact. Romberg: negative.  Coordination: FNF and heel-shin tests intact b/l.   Gait:  Normal, able to tandem walk without difficulty.  DATA:   LABS/EEG/IMAGING/OTHER STUDIES: I reviewed pertinent medical records, as detailed in the history of present illness.  ASSESSMENT AND PLAN:      ASSESSMENT: Carlie Brumfield is a 52 year old female patient with listed above past medical history, who presents with intermittent positional dizziness/lightheadedness for the last several years in context of reduced fluid intake and iron deficiency anemia.    We had a detailed discussion with the patient regarding her presenting complaints.  The neurological exam today is non-focal, including vestibular testing.  Orthostatic testing today demonstrated 31 mmHg drop between lying and standing, which is suggestive of orthostatic hypotension.  I discussed this finding with the patient suggesting that she needs to increase her fluid intake while reducing coffee.  She also has iron  deficiency anemia that might be contributory to her lightheadedness.  At the same time, I do not see any neurological causes of dizziness and would not recommend any additional work-up.    DIAGNOSES:    ICD-10-CM    1. Dizziness  R42 Adult Neurology  Referral      2. Incoordination  R27.9 Adult Neurology  Referral        PLAN: At today's visit we thoroughly discussed various diagnostic possibilities for patient's symptoms, results of the performed work-up, and the plan.    I advised the patient to gradually reduce her daily coffee intake while increasing fluid (water) intake to equal 64 fluid ounces per day.  We also need to treat her iron deficiency anemia adequately to see if the combination of these 2 interventions help.  If she continues to feel lightheaded after that, cardiac evaluation might be considered.    Next follow-up appointment is on as needed basis.    Total Time: 63 minutes spent on the date of the encounter doing chart review, history and exam, documentation and further activities per the note.    Chito Younger MD  [facility]  (Chart documentation was completed in part with Dragon voice-recognition software. Even though reviewed, some grammatical, spelling, and word errors may remain.)

## 2022-10-14 ENCOUNTER — LAB (OUTPATIENT)
Dept: LAB | Facility: CLINIC | Age: 52
End: 2022-10-14
Payer: COMMERCIAL

## 2022-10-14 DIAGNOSIS — D50.9 IRON DEFICIENCY ANEMIA, UNSPECIFIED IRON DEFICIENCY ANEMIA TYPE: ICD-10-CM

## 2022-10-14 LAB
BASOPHILS # BLD AUTO: 0 10E3/UL (ref 0–0.2)
BASOPHILS NFR BLD AUTO: 1 %
EOSINOPHIL # BLD AUTO: 0.2 10E3/UL (ref 0–0.7)
EOSINOPHIL NFR BLD AUTO: 4 %
ERYTHROCYTE [DISTWIDTH] IN BLOOD BY AUTOMATED COUNT: 16 % (ref 10–15)
HCT VFR BLD AUTO: 38.6 % (ref 35–47)
HGB BLD-MCNC: 11.9 G/DL (ref 11.7–15.7)
IMM GRANULOCYTES # BLD: 0 10E3/UL
IMM GRANULOCYTES NFR BLD: 0 %
LYMPHOCYTES # BLD AUTO: 1.7 10E3/UL (ref 0.8–5.3)
LYMPHOCYTES NFR BLD AUTO: 38 %
MCH RBC QN AUTO: 24.7 PG (ref 26.5–33)
MCHC RBC AUTO-ENTMCNC: 30.8 G/DL (ref 31.5–36.5)
MCV RBC AUTO: 80 FL (ref 78–100)
MONOCYTES # BLD AUTO: 0.3 10E3/UL (ref 0–1.3)
MONOCYTES NFR BLD AUTO: 7 %
NEUTROPHILS # BLD AUTO: 2.2 10E3/UL (ref 1.6–8.3)
NEUTROPHILS NFR BLD AUTO: 50 %
PLATELET # BLD AUTO: 233 10E3/UL (ref 150–450)
RBC # BLD AUTO: 4.82 10E6/UL (ref 3.8–5.2)
WBC # BLD AUTO: 4.4 10E3/UL (ref 4–11)

## 2022-10-14 PROCEDURE — 83540 ASSAY OF IRON: CPT

## 2022-10-14 PROCEDURE — 36415 COLL VENOUS BLD VENIPUNCTURE: CPT

## 2022-10-14 PROCEDURE — 85025 COMPLETE CBC W/AUTO DIFF WBC: CPT

## 2022-10-14 PROCEDURE — 83550 IRON BINDING TEST: CPT

## 2022-10-14 PROCEDURE — 82728 ASSAY OF FERRITIN: CPT

## 2022-10-15 LAB
FERRITIN SERPL-MCNC: 7 NG/ML (ref 8–252)
IRON SATN MFR SERPL: 8 % (ref 15–46)
IRON SERPL-MCNC: 28 UG/DL (ref 35–180)
TIBC SERPL-MCNC: 364 UG/DL (ref 240–430)

## 2022-10-18 ENCOUNTER — TELEPHONE (OUTPATIENT)
Dept: INTERNAL MEDICINE | Facility: CLINIC | Age: 52
End: 2022-10-18

## 2022-10-18 DIAGNOSIS — D50.9 IRON DEFICIENCY ANEMIA, UNSPECIFIED IRON DEFICIENCY ANEMIA TYPE: Primary | ICD-10-CM

## 2022-10-21 ENCOUNTER — TELEPHONE (OUTPATIENT)
Dept: ORTHOPEDICS | Facility: CLINIC | Age: 52
End: 2022-10-21

## 2022-10-21 DIAGNOSIS — M47.817 FACET ARTHROPATHY, LUMBOSACRAL: Primary | ICD-10-CM

## 2022-10-21 NOTE — TELEPHONE ENCOUNTER
[facility] Call Center    Phone Message    May a detailed message be left on voicemail: yes     Reason for Call: Other: Pt called today to make a follow up appt (next avail) on 11/25 but wanted to report that her rt buttocks has constant pain she reports heat and or ice is not relieving the pain and is asking for options      Action Taken: Other: ortho [place]    Travel Screening: Not Applicable

## 2022-10-24 RX ORDER — GABAPENTIN 300 MG/1
600 CAPSULE ORAL 3 TIMES DAILY
Qty: 360 CAPSULE | Refills: 0 | Status: SHIPPED | OUTPATIENT
Start: 2022-10-24 | End: 2023-10-06

## 2022-10-24 NOTE — TELEPHONE ENCOUNTER
I called and spoke with Ms. Carlie Brumfield.  She is noting burning pain in her right buttock.  Discussed post ablation neuritis with Ms. Brumfield.  She will be started on gabapentin 300 mg p.o. nightly with instructions on how to ramp up the dose of this medication to 600 mg p.o. 3 times daily.  Ms. Brumfield is to contact this clinic few weeks to let us know how she is feeling.    Alpesh Leger MD

## 2022-11-19 ENCOUNTER — HEALTH MAINTENANCE LETTER (OUTPATIENT)
Age: 52
End: 2022-11-19

## 2022-11-22 NOTE — PROGRESS NOTES
"PHYSICAL MEDICINE & REHABILITATION / MEDICAL SPINE        Date:  Nov 25, 2022    Name:  Carlie Brumfield  YOB: 1970  MRN:  [id number]          CHIEF COMPLAINT:  Right buttock and lateral hip pain.        HISTORY OF PRESENT ILLNESS:  Ms. Carlie Brumfield is a 52-year-old female.  She is .    She and her  have a son who is currently in school at the [employer].  They have a daughter who is in school at the [employer].  Ms. Brumfield works for [employer].     Ms. Carlie Brumfield stated she had a knee surgery and had a foot surgery.  She denied any other injuries or surgeries of her low back, pelvis, hips, thighs, knees, legs, ankles, feet, toes.    Ms. Carlie Brumfield denied any personal or family history of autoimmune diseases, rheumatologic disease, gout, pseudogout.  She denied any personal or family history of neurologic diseases.  Ms. Brumfield denied any personal or family history of inflammatory bowel diseases.    Ms. Carlie Brumfield was last seen in clinic on 08/30/2022.  On 10/05/2022, Ms. Brumfield had fluoroscopic-guided medial branch radiofrequency ablations / neurotomies of the right L3-L4 and L4-L5 facet joints.  On 10/12/2022, Ms. Brumfield had fluoroscopic-guided medial branch radiofrequency ablations / neurotomies of the left L3-L4 and L4-L5 facet joints.  Ms. Carlie Brumfield returns to clinic today, 11/25/2022.    Ms. Carlie Brumfield is no longer having any low back pain.  She reports 100% improvement in her low back pain.    Ms. Carlie Brumfield notes pain over her right lateral hip and buttock.  She describes this pain as \"dead.\"  This pain is rated as 3/10.  Pain is constant and worsened with lying on her right side.  This pain has not changed since it began.  In terms of pain medications, Ms. Carlie Brumfield is taking gabapentin 300 mg a total of 3 tablets/day.    Ms. Carlie Brumfield denies any weakness.  She denies any give way episodes of her lower extremities.  " She states her tripping and stumbling is at its baseline.  Ms. Brumfield denies any falls.  Ms. Carlie Brumfield denies any numbness, tingling, pins-and-needles.  She denies any saddle anesthesia, bowel incontinence.  She does have some bladder incontinence.    Ms. Carlie Brumfield works for [employer].  She is very busy right now.  She does not feel that she has time to do physical therapy at this time.  Ms. Brumfield would like to follow-up in mid January 2023.        ALLERGIES:  Allergies   Allergen Reactions     No Known Allergies          MEDICATIONS:  Current Outpatient Medications   Medication Sig Dispense Refill     ferrous sulfate (FE TABS) 325 (65 Fe) MG EC tablet Take 1 tablet (325 mg) by mouth 2 times daily (with meals) 180 tablet 1     gabapentin (NEURONTIN) 300 MG capsule Take 2 capsules (600 mg) by mouth 3 times daily for 60 days 360 capsule 0     levothyroxine (SYNTHROID/LEVOTHROID) 150 MCG tablet TAKE 1 TABLET BY MOUTH EVERY DAY 90 tablet 2         PAST MEDICAL HISTORY:  Past Medical History:   Diagnosis Date     History of blood transfusion     During hysterectomy     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    Hospitalized     Thyroid disease      Unspecified urinary incontinence     Urgency incontinence -- abstracted 7/15/02         PAST SURGICAL HISTORY:  Past Surgical History:   Procedure Laterality Date     APPENDECTOMY       BIOPSY      Breast     COLONOSCOPY N/A 7/10/2018    Procedure: COLONOSCOPY;  Colonoscopy ([facility])  ;  Surgeon: Lino Lopez MD;  Location: [facility]     DESTRUCTION OF PARAVERTEBRAL FACET LUMBAR / SACRAL SINGLE Right 10/5/2022    Procedure: radiofrequency ablations / neurotomies of the right L3-L4 and L4-L5 facet joints;  Surgeon: Alpesh Leger MD;  Location: [facility] OR     DESTRUCTION OF PARAVERTEBRAL FACET LUMBAR / SACRAL SINGLE Left 10/12/2022    Procedure: radiofrequency ablations / neurotomies of the left L3-L4 and L4-L5 facet joints;  Surgeon: Alpesh Leger MD;   "Location: [facility] OR     ESOPHAGOSCOPY, GASTROSCOPY, DUODENOSCOPY (EGD), COMBINED       ESOPHAGOSCOPY, GASTROSCOPY, DUODENOSCOPY (EGD), COMBINED N/A 12/18/2015    Procedure: COMBINED ESOPHAGOSCOPY, GASTROSCOPY, DUODENOSCOPY (EGD), BIOPSY SINGLE OR MULTIPLE;  Surgeon: Lino Lopez MD;  Location:  GI     GI SURGERY      bariatric surgery     HYSTERECTOMY, PAP NO LONGER INDICATED       ORTHOPEDIC SURGERY       [facility] NONSPECIFIC PROCEDURE      2 c-sections     [facility] NONSPECIFIC PROCEDURE      Appendectomy -- abstracted 7/15/02     [facility] NONSPECIFIC PROCEDURE      Left thumb tendon repair after injury         FAMILY HISTORY:  Family History   Problem Relation Age of Onset     Osteoporosis Mother      Unknown/Adopted Father          SOCIAL HISTORY:  Social History     Socioeconomic History     Marital status:      Spouse name: Not on file     Number of children: Not on file     Years of education: Not on file     Highest education level: Not on file   Occupational History     Not on file   Tobacco Use     Smoking status: Never     Smokeless tobacco: Never   Vaping Use     Vaping Use: Never used   Substance and Sexual Activity     Alcohol use: Yes     Alcohol/week: 0.0 standard drinks     Comment: rare     Drug use: No     Sexual activity: Yes     Partners: Male     Birth control/protection: Female Surgical   Other Topics Concern     Parent/sibling w/ CABG, MI or angioplasty before 65F 55M? No   Social History Narrative     Not on file     Social Determinants of Health     Financial Resource Strain: Not on file   Food Insecurity: Not on file   Transportation Needs: Not on file   Physical Activity: Not on file   Stress: Not on file   Social Connections: Not on file   Intimate Partner Violence: Not on file   Housing Stability: Not on file         PHYSICAL EXAMINATION:  Vitals:    11/25/22 1445   BP: 130/83   Pulse: 65   SpO2: 100%   Weight: 72.6 kg (160 lb)   Height: 1.676 m (5' 6\")       GENERAL:  No acute distress.  " Pleasant and cooperative.   PSYCH:  Normal mood and affect.  HEAD:  Normocephalic.  SPEECH:  No dysarthria.  EYES:  No scleral icterus.  Wearing glasses.  EARS:  Hearing is intact to spoken voice.  NOSE/MOUTH:  Wearing a face mask.  LUNGS:  No respiratory distress.  No increased work of breathing.  VASCULAR/PULSES:  Posterior tibial:  Right 2+.  Left 2+.  Dorsalis pedis:  Right 2+.  Left 2+.  LOWER EXTREMITIES: No clubbing, cyanosis, or edema bilaterally.    BALANCE AND GAIT: The patient has reciprocal gait pattern without antalgia.  She is able heel walk, toe walk, tandem walk without difficulty.  Double leg squat is performed with quadriceps dominant pattern.  Right single-leg squat is mild dynamic knee valgus.  Left single-leg squat is performed normally.    INSPECTION:  There is no erythema, ecchymosis, deformity, asymmetry, or abnormality of the low back.    LEG LENGTH DISCREPANCY: With standing, right iliac crest appears to be 1 cm higher than the left iliac crest.  With lying supine, right medial malleolus extends half to 1 cm further than the left medial malleolus.    LUMBOPELVIC PALPATION:  Lumbar Spinous Processes:  not tender.  Lumbar Paraspinals:  Right not tender.  Left not tender.  Posterior Superior Iliac Spine:  Right mild to moderate tenderness.  Left mild tenderness.  Iliac Crest:  Right not tender.  Left not tender.  Sacroiliac Joint:  Right moderate tenderness.  Left not tender.  Hip External Rotators:  Right moderate tenderness.  Left not tender.  Ischial Tuberosity:  Right not tender.  Left not tender.  Greater Trochanter:  Right moderate tenderness.  Left not tender.  Coccyx:  not tender.    THORACOLUMBAR RANGE OF MOTION:  Forward flexion (85 ):  Normal range of motion, does not cause pain, does not cause radiating pain.  Extension (30 ):  Normal range of motion, does not cause pain, does not cause radiating pain.  Lateral bending right (30 ):  Normal range of motion, does not cause pain, does  not cause radiating pain.  Lateral bending left (30 ):  Normal range of motion, does not cause pain, does not cause radiating pain.  Twisting right with extension:  Normal range of motion, does not cause pain, does not cause radiating pain.  Twisting left with extension:  Normal range of motion, does not cause pain, does not cause radiating pain.  Sacroiliac joint dysfunction:  Right -.  Left -.    HIP RANGE OF MOTION:  Flexion (110 ):  Right 115 .  Left 115 .  Extension (30 ):  Right 35 .  Left 35 .  External rotation (45 ):  Right 50 .  Left 50 .  Internal rotation (35 ):  Right 40 .  Left 40 .    KNEE RANGE OF MOTION:  Extension (0 ):  Right 0 .  Left 0 .  Flexion (135 ):  Right 140 .  Left 140 .    STRENGTH:  Hip flexion:  Right 5/5.  Left 5/5.  Hip abduction:  Right 4/5.  Left 5/5.  Hip external rotation:  Right 4/5.  Left 5/5.  Hip extension:  Right 5/5.  Left 5/5.  Knee extension:  Right 5/5.  Left 5/5.  Knee flexion:  Right 5/5.  Left 5/5.  Ankle dorsiflexion:  Right 5/5.  Left 5/5.  Great toe dorsiflexion:  Right 5/5.  Left 5/5.  Ankle plantar flexion:  Right 5/5.  Left 5/5.    SENSATION:  Intact to light touch along right L2, L3, L4, L5, S1, S2.  Intact to light touch along left L2, L3, L4, L5, S1, S2.    REFLEXES:  Patellar (L2-L4):  Right 2+.  Left 2+.  Medial hamstrings (L5-S1):  Right 2+.  Left 2+.  Achilles (S1-S2):  Right 2+.  Left 2+.  Babinski:  Right downgoing.  Left downgoing.  Forced ankle dorsiflexion (clonus):  Right 1 beat.  Left 1 beat.    LUMBOPELVIC SPECIAL TESTS:  Log roll:  Right -.  Left -.  Straight leg raise:  Right -.  Left -.  Crossover straight leg raise:  Right -.  Left -.  Resisted straight leg raise:  Right -.  Left -.  MERCY:  Right -.  Left -.  FADIR:  Right -.  Left -.  Hip scour:  Right -.  Left -.  Lateral sacral compression:  Right -.  Left -.  Clamshell:  Right -.  Left -.  Ely s:  Right -.  Left -.  Yeoman s:  Right -.  Left -.  Distraction:  Right -.  Left -.  Sacral  thrust:  Right -.  Left -.  Noble compression:  Right -.  Left -.        IMAGING:  There is no new imaging.        ASSESSMENT/PLAN:  Ms. Carlie Brumfield is a 52-year-old female.  She has had significant improvement in her symptoms of bilateral lumbosacral facet arthropathy.  Ms. Brumfield has right greater than left greater trochanteric pain syndrome.  Discussed lumbosacral facet arthropathy with Ms. Brumfield.  Discussed greater trochanteric pain syndrome, pathophysiology, and treatment options with Ms. Brumfield.  Discussed the options of doing nothing/living with it, physical therapy, greater trochanteric bursa corticosteroid injection, gluteus medius and minimus tendon versus fenestration with platelet rich plasma injection.  Ms. Carlie Brumfield would like to be set up for an ultrasound-guided right greater trochanteric bursa corticosteroid injection.  She is to follow-up in this clinic in mid January 2023.  At that appointment, she will consider physical therapy.        Total Time on encounter:  38 minutes were spent on one more or more of the following:  discussion with patient, history, exam, coordinating care, treatment goals, record review, documenting clinical information, and/or data review.      Alpesh Leger MD

## 2022-11-25 ENCOUNTER — ANCILLARY PROCEDURE (OUTPATIENT)
Dept: GENERAL RADIOLOGY | Facility: CLINIC | Age: 52
End: 2022-11-25
Attending: PHYSICAL MEDICINE & REHABILITATION
Payer: COMMERCIAL

## 2022-11-25 ENCOUNTER — OFFICE VISIT (OUTPATIENT)
Dept: ORTHOPEDICS | Facility: CLINIC | Age: 52
End: 2022-11-25
Payer: COMMERCIAL

## 2022-11-25 VITALS
SYSTOLIC BLOOD PRESSURE: 130 MMHG | DIASTOLIC BLOOD PRESSURE: 83 MMHG | HEIGHT: 66 IN | BODY MASS INDEX: 25.71 KG/M2 | HEART RATE: 65 BPM | OXYGEN SATURATION: 100 % | WEIGHT: 160 LBS

## 2022-11-25 DIAGNOSIS — M47.817 FACET ARTHROPATHY, LUMBOSACRAL: ICD-10-CM

## 2022-11-25 DIAGNOSIS — M25.552 GREATER TROCHANTERIC PAIN SYNDROME OF LEFT LOWER EXTREMITY: ICD-10-CM

## 2022-11-25 DIAGNOSIS — M21.70 LEG LENGTH DISCREPANCY: ICD-10-CM

## 2022-11-25 DIAGNOSIS — M25.551 GREATER TROCHANTERIC PAIN SYNDROME OF RIGHT LOWER EXTREMITY: Primary | ICD-10-CM

## 2022-11-25 PROCEDURE — 99214 OFFICE O/P EST MOD 30 MIN: CPT | Performed by: PHYSICAL MEDICINE & REHABILITATION

## 2022-11-25 PROCEDURE — 77073 BONE LENGTH STUDIES: CPT | Mod: TC | Performed by: RADIOLOGY

## 2022-11-25 ASSESSMENT — PAIN SCALES - GENERAL: PAINLEVEL: MILD PAIN (3)

## 2022-11-25 NOTE — PATIENT INSTRUCTIONS
Please schedule ultrasound-guided right greater trochanteric bursa injection with me.  The [facility] Procedure Scheduling Team will call you to schedule your procedure in the next 0-3 days.  You can also call them directly at [phone].    Please schedule a follow-up appointment 6 weeks.  You can schedule this at the , or you can call [phone].

## 2022-11-25 NOTE — LETTER
"    11/25/2022         RE: Carlie Brumfield  [address] [facility] [address]        Dear Colleague,    Thank you for referring your patient, Carlie Brumfield, to the [facility]. Please see a copy of my visit note below.    PHYSICAL MEDICINE & REHABILITATION / MEDICAL SPINE        Date:  Nov 25, 2022    Name:  Carlie Brumfield  YOB: 1970  MRN:  [id number]          CHIEF COMPLAINT:  Right buttock and lateral hip pain.        HISTORY OF PRESENT ILLNESS:  Ms. Carlie Brumfield is a 52-year-old female.  She is .    She and her  have a son who is currently in school at the [employer].  They have a daughter who is in school at the [employer].  Ms. Brumfield works for [employer].     Ms. Carlie Brumfield stated she had a knee surgery and had a foot surgery.  She denied any other injuries or surgeries of her low back, pelvis, hips, thighs, knees, legs, ankles, feet, toes.    Ms. Carlie Brumfield denied any personal or family history of autoimmune diseases, rheumatologic disease, gout, pseudogout.  She denied any personal or family history of neurologic diseases.  Ms. Brumfield denied any personal or family history of inflammatory bowel diseases.    Ms. Carlie Brumfield was last seen in clinic on 08/30/2022.  On 10/05/2022, Ms. Brumfield had fluoroscopic-guided medial branch radiofrequency ablations / neurotomies of the right L3-L4 and L4-L5 facet joints.  On 10/12/2022, Ms. Brumfield had fluoroscopic-guided medial branch radiofrequency ablations / neurotomies of the left L3-L4 and L4-L5 facet joints.  Ms. Carlie Brumfield returns to clinic today, 11/25/2022.    Ms. Carlie Brumfield is no longer having any low back pain.  She reports 100% improvement in her low back pain.    Ms. Carlie Brumfield notes pain over her right lateral hip and buttock.  She describes this pain as \"dead.\"  This pain is rated as 3/10.  Pain is constant and worsened with lying on her " right side.  This pain has not changed since it began.  In terms of pain medications, Ms. Carlie Brumfield is taking gabapentin 300 mg a total of 3 tablets/day.    Ms. Carlie Brumfield denies any weakness.  She denies any give way episodes of her lower extremities.  She states her tripping and stumbling is at its baseline.  Ms. Brumfield denies any falls.  Ms. Carlie Brumfield denies any numbness, tingling, pins-and-needles.  She denies any saddle anesthesia, bowel incontinence.  She does have some bladder incontinence.    Ms. Carlie Brumfield works for [employer].  She is very busy right now.  She does not feel that she has time to do physical therapy at this time.  Ms. Brumfield would like to follow-up in mid January 2023.        ALLERGIES:  Allergies   Allergen Reactions     No Known Allergies          MEDICATIONS:  Current Outpatient Medications   Medication Sig Dispense Refill     ferrous sulfate (FE TABS) 325 (65 Fe) MG EC tablet Take 1 tablet (325 mg) by mouth 2 times daily (with meals) 180 tablet 1     gabapentin (NEURONTIN) 300 MG capsule Take 2 capsules (600 mg) by mouth 3 times daily for 60 days 360 capsule 0     levothyroxine (SYNTHROID/LEVOTHROID) 150 MCG tablet TAKE 1 TABLET BY MOUTH EVERY DAY 90 tablet 2         PAST MEDICAL HISTORY:  Past Medical History:   Diagnosis Date     History of blood transfusion     During hysterectomy     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    Hospitalized     Thyroid disease      Unspecified urinary incontinence     Urgency incontinence -- abstracted 7/15/02         PAST SURGICAL HISTORY:  Past Surgical History:   Procedure Laterality Date     APPENDECTOMY       BIOPSY      Breast     COLONOSCOPY N/A 7/10/2018    Procedure: COLONOSCOPY;  Colonoscopy ([facility])  ;  Surgeon: Lino Lopez MD;  Location: [facility]     DESTRUCTION OF PARAVERTEBRAL FACET LUMBAR / SACRAL SINGLE Right 10/5/2022    Procedure: radiofrequency ablations / neurotomies of the right L3-L4 and L4-L5  facet joints;  Surgeon: Alpesh Leger MD;  Location: [facility] OR     DESTRUCTION OF PARAVERTEBRAL FACET LUMBAR / SACRAL SINGLE Left 10/12/2022    Procedure: radiofrequency ablations / neurotomies of the left L3-L4 and L4-L5 facet joints;  Surgeon: Alpesh Leger MD;  Location: [facility] OR     ESOPHAGOSCOPY, GASTROSCOPY, DUODENOSCOPY (EGD), COMBINED       ESOPHAGOSCOPY, GASTROSCOPY, DUODENOSCOPY (EGD), COMBINED N/A 12/18/2015    Procedure: COMBINED ESOPHAGOSCOPY, GASTROSCOPY, DUODENOSCOPY (EGD), BIOPSY SINGLE OR MULTIPLE;  Surgeon: Lino Lopez MD;  Location:  GI     GI SURGERY      bariatric surgery     HYSTERECTOMY, PAP NO LONGER INDICATED       ORTHOPEDIC SURGERY       [facility] NONSPECIFIC PROCEDURE      2 c-sections     [facility] NONSPECIFIC PROCEDURE      Appendectomy -- abstracted 7/15/02     [facility] NONSPECIFIC PROCEDURE      Left thumb tendon repair after injury         FAMILY HISTORY:  Family History   Problem Relation Age of Onset     Osteoporosis Mother      Unknown/Adopted Father          SOCIAL HISTORY:  Social History     Socioeconomic History     Marital status:      Spouse name: Not on file     Number of children: Not on file     Years of education: Not on file     Highest education level: Not on file   Occupational History     Not on file   Tobacco Use     Smoking status: Never     Smokeless tobacco: Never   Vaping Use     Vaping Use: Never used   Substance and Sexual Activity     Alcohol use: Yes     Alcohol/week: 0.0 standard drinks     Comment: rare     Drug use: No     Sexual activity: Yes     Partners: Male     Birth control/protection: Female Surgical   Other Topics Concern     Parent/sibling w/ CABG, MI or angioplasty before 65F 55M? No   Social History Narrative     Not on file     Social Determinants of Health     Financial Resource Strain: Not on file   Food Insecurity: Not on file   Transportation Needs: Not on file   Physical Activity: Not on file   Stress: Not on file   Social Connections:  "Not on file   Intimate Partner Violence: Not on file   Housing Stability: Not on file         PHYSICAL EXAMINATION:  Vitals:    11/25/22 1445   BP: 130/83   Pulse: 65   SpO2: 100%   Weight: 72.6 kg (160 lb)   Height: 1.676 m (5' 6\")       GENERAL:  No acute distress.  Pleasant and cooperative.   PSYCH:  Normal mood and affect.  HEAD:  Normocephalic.  SPEECH:  No dysarthria.  EYES:  No scleral icterus.  Wearing glasses.  EARS:  Hearing is intact to spoken voice.  NOSE/MOUTH:  Wearing a face mask.  LUNGS:  No respiratory distress.  No increased work of breathing.  VASCULAR/PULSES:  Posterior tibial:  Right 2+.  Left 2+.  Dorsalis pedis:  Right 2+.  Left 2+.  LOWER EXTREMITIES: No clubbing, cyanosis, or edema bilaterally.    BALANCE AND GAIT: The patient has reciprocal gait pattern without antalgia.  She is able heel walk, toe walk, tandem walk without difficulty.  Double leg squat is performed with quadriceps dominant pattern.  Right single-leg squat is mild dynamic knee valgus.  Left single-leg squat is performed normally.    INSPECTION:  There is no erythema, ecchymosis, deformity, asymmetry, or abnormality of the low back.    LEG LENGTH DISCREPANCY: With standing, right iliac crest appears to be 1 cm higher than the left iliac crest.  With lying supine, right medial malleolus extends half to 1 cm further than the left medial malleolus.    LUMBOPELVIC PALPATION:  Lumbar Spinous Processes:  not tender.  Lumbar Paraspinals:  Right not tender.  Left not tender.  Posterior Superior Iliac Spine:  Right mild to moderate tenderness.  Left mild tenderness.  Iliac Crest:  Right not tender.  Left not tender.  Sacroiliac Joint:  Right moderate tenderness.  Left not tender.  Hip External Rotators:  Right moderate tenderness.  Left not tender.  Ischial Tuberosity:  Right not tender.  Left not tender.  Greater Trochanter:  Right moderate tenderness.  Left not tender.  Coccyx:  not tender.    THORACOLUMBAR RANGE OF " MOTION:  Forward flexion (85 ):  Normal range of motion, does not cause pain, does not cause radiating pain.  Extension (30 ):  Normal range of motion, does not cause pain, does not cause radiating pain.  Lateral bending right (30 ):  Normal range of motion, does not cause pain, does not cause radiating pain.  Lateral bending left (30 ):  Normal range of motion, does not cause pain, does not cause radiating pain.  Twisting right with extension:  Normal range of motion, does not cause pain, does not cause radiating pain.  Twisting left with extension:  Normal range of motion, does not cause pain, does not cause radiating pain.  Sacroiliac joint dysfunction:  Right -.  Left -.    HIP RANGE OF MOTION:  Flexion (110 ):  Right 115 .  Left 115 .  Extension (30 ):  Right 35 .  Left 35 .  External rotation (45 ):  Right 50 .  Left 50 .  Internal rotation (35 ):  Right 40 .  Left 40 .    KNEE RANGE OF MOTION:  Extension (0 ):  Right 0 .  Left 0 .  Flexion (135 ):  Right 140 .  Left 140 .    STRENGTH:  Hip flexion:  Right 5/5.  Left 5/5.  Hip abduction:  Right 4/5.  Left 5/5.  Hip external rotation:  Right 4/5.  Left 5/5.  Hip extension:  Right 5/5.  Left 5/5.  Knee extension:  Right 5/5.  Left 5/5.  Knee flexion:  Right 5/5.  Left 5/5.  Ankle dorsiflexion:  Right 5/5.  Left 5/5.  Great toe dorsiflexion:  Right 5/5.  Left 5/5.  Ankle plantar flexion:  Right 5/5.  Left 5/5.    SENSATION:  Intact to light touch along right L2, L3, L4, L5, S1, S2.  Intact to light touch along left L2, L3, L4, L5, S1, S2.    REFLEXES:  Patellar (L2-L4):  Right 2+.  Left 2+.  Medial hamstrings (L5-S1):  Right 2+.  Left 2+.  Achilles (S1-S2):  Right 2+.  Left 2+.  Babinski:  Right downgoing.  Left downgoing.  Forced ankle dorsiflexion (clonus):  Right 1 beat.  Left 1 beat.    LUMBOPELVIC SPECIAL TESTS:  Log roll:  Right -.  Left -.  Straight leg raise:  Right -.  Left -.  Crossover straight leg raise:  Right -.  Left -.  Resisted straight leg  raise:  Right -.  Left -.  MERCY:  Right -.  Left -.  FADIR:  Right -.  Left -.  Hip scour:  Right -.  Left -.  Lateral sacral compression:  Right -.  Left -.  Clamshell:  Right -.  Left -.  Ely s:  Right -.  Left -.  Yeoman s:  Right -.  Left -.  Distraction:  Right -.  Left -.  Sacral thrust:  Right -.  Left -.  Noble compression:  Right -.  Left -.        IMAGING:  There is no new imaging.        ASSESSMENT/PLAN:  Ms. Carlie Brumfield is a 52-year-old female.  She has had significant improvement in her symptoms of bilateral lumbosacral facet arthropathy.  Ms. Brumfield has right greater than left greater trochanteric pain syndrome.  Discussed lumbosacral facet arthropathy with Ms. Brumfield.  Discussed greater trochanteric pain syndrome, pathophysiology, and treatment options with Ms. Brumfield.  Discussed the options of doing nothing/living with it, physical therapy, greater trochanteric bursa corticosteroid injection, gluteus medius and minimus tendon versus fenestration with platelet rich plasma injection.  Ms. Carlie Brumfield would like to be set up for an ultrasound-guided right greater trochanteric bursa corticosteroid injection.  She is to follow-up in this clinic in mid January 2023.  At that appointment, she will consider physical therapy.        Total Time on encounter:  38 minutes were spent on one more or more of the following:  discussion with patient, history, exam, coordinating care, treatment goals, record review, documenting clinical information, and/or data review.      Alpesh Leger MD          Again, thank you for allowing me to participate in the care of your patient.        Sincerely,        Alpesh Leger MD

## 2022-11-25 NOTE — LETTER
Date:November 28, 2022      Provider requested that no letter be sent. Do not send.       [facility]

## 2022-12-05 ENCOUNTER — HOSPITAL ENCOUNTER (OUTPATIENT)
Dept: ULTRASOUND IMAGING | Facility: CLINIC | Age: 52
Discharge: HOME | End: 2022-12-05
Attending: PHYSICAL MEDICINE & REHABILITATION | Admitting: PHYSICAL MEDICINE & REHABILITATION
Payer: COMMERCIAL

## 2022-12-05 ENCOUNTER — TELEPHONE (OUTPATIENT)
Dept: ORTHOPEDICS | Facility: CLINIC | Age: 52
End: 2022-12-05

## 2022-12-05 VITALS
OXYGEN SATURATION: 97 % | DIASTOLIC BLOOD PRESSURE: 61 MMHG | RESPIRATION RATE: 18 BRPM | HEART RATE: 59 BPM | SYSTOLIC BLOOD PRESSURE: 108 MMHG

## 2022-12-05 DIAGNOSIS — M25.551 GREATER TROCHANTERIC PAIN SYNDROME OF RIGHT LOWER EXTREMITY: ICD-10-CM

## 2022-12-05 DIAGNOSIS — M25.552 GREATER TROCHANTERIC PAIN SYNDROME OF LEFT LOWER EXTREMITY: Primary | ICD-10-CM

## 2022-12-05 PROCEDURE — 250N000009 HC RX 250

## 2022-12-05 PROCEDURE — 250N000011 HC RX IP 250 OP 636

## 2022-12-05 PROCEDURE — 20611 DRAIN/INJ JOINT/BURSA W/US: CPT | Mod: RT | Performed by: PHYSICAL MEDICINE & REHABILITATION

## 2022-12-05 PROCEDURE — 20611 DRAIN/INJ JOINT/BURSA W/US: CPT | Mod: RT

## 2022-12-05 PROCEDURE — 250N000011 HC RX IP 250 OP 636: Performed by: PHYSICAL MEDICINE & REHABILITATION

## 2022-12-05 RX ORDER — LIDOCAINE HYDROCHLORIDE 10 MG/ML
5 INJECTION, SOLUTION EPIDURAL; INFILTRATION; INTRACAUDAL; PERINEURAL ONCE
Status: COMPLETED | OUTPATIENT
Start: 2022-12-05 | End: 2022-12-05

## 2022-12-05 RX ORDER — BUPIVACAINE HYDROCHLORIDE 2.5 MG/ML
4 INJECTION, SOLUTION EPIDURAL; INFILTRATION; INTRACAUDAL ONCE
Status: COMPLETED | OUTPATIENT
Start: 2022-12-05 | End: 2022-12-05

## 2022-12-05 RX ORDER — TRIAMCINOLONE ACETONIDE 40 MG/ML
40 INJECTION, SUSPENSION INTRA-ARTICULAR; INTRAMUSCULAR ONCE
Status: COMPLETED | OUTPATIENT
Start: 2022-12-05 | End: 2022-12-05

## 2022-12-05 RX ORDER — LIDOCAINE HYDROCHLORIDE 10 MG/ML
INJECTION, SOLUTION EPIDURAL; INFILTRATION; INTRACAUDAL; PERINEURAL
Status: COMPLETED
Start: 2022-12-05 | End: 2022-12-05

## 2022-12-05 RX ORDER — TRIAMCINOLONE ACETONIDE 40 MG/ML
INJECTION, SUSPENSION INTRA-ARTICULAR; INTRAMUSCULAR
Status: COMPLETED
Start: 2022-12-05 | End: 2022-12-05

## 2022-12-05 RX ADMIN — TRIAMCINOLONE ACETONIDE 40 MG: 40 INJECTION, SUSPENSION INTRA-ARTICULAR; INTRAMUSCULAR at 10:20

## 2022-12-05 RX ADMIN — BUPIVACAINE HYDROCHLORIDE 4 MG: 2.5 INJECTION, SOLUTION EPIDURAL; INFILTRATION; INTRACAUDAL; PERINEURAL at 10:19

## 2022-12-05 RX ADMIN — LIDOCAINE HYDROCHLORIDE 3 ML: 10 INJECTION, SOLUTION EPIDURAL; INFILTRATION; INTRACAUDAL; PERINEURAL at 10:20

## 2022-12-05 NOTE — PROGRESS NOTES
Patient tolerated right greater trochanter bursa steroid injection  well by Dr Leger.  Site Clean Dry and intact, dressing applied with no drainage.  Patient rated pre procedural pain at 4/10 and post pain at 0/10.  Patient home per self, understands written and oral instructions.    Pura Marrero RN, BSN

## 2023-06-01 ENCOUNTER — HEALTH MAINTENANCE LETTER (OUTPATIENT)
Age: 53
End: 2023-06-01

## 2023-08-30 ENCOUNTER — HOSPITAL ENCOUNTER (OUTPATIENT)
Dept: MAMMOGRAPHY | Facility: CLINIC | Age: 53
Discharge: HOME | End: 2023-08-30
Attending: INTERNAL MEDICINE | Admitting: INTERNAL MEDICINE
Payer: COMMERCIAL

## 2023-08-30 DIAGNOSIS — Z12.31 VISIT FOR SCREENING MAMMOGRAM: ICD-10-CM

## 2023-08-30 PROCEDURE — 77067 SCR MAMMO BI INCL CAD: CPT

## 2023-09-11 DIAGNOSIS — E05.00 GRAVES DISEASE: ICD-10-CM

## 2023-09-11 RX ORDER — LEVOTHYROXINE SODIUM 150 UG/1
150 TABLET ORAL DAILY
Qty: 30 TABLET | Refills: 0 | Status: SHIPPED | OUTPATIENT
Start: 2023-09-11 | End: 2023-10-12

## 2023-09-11 NOTE — TELEPHONE ENCOUNTER
Last filled by Yassine. Please review    Routed to covering provider- SARIAH Joseph    Next 5 appointments (look out 90 days)      Oct 06, 2023  7:30 AM  (Arrive by 7:10 AM)  Adult Preventative Visit with Ollie Joseph MD  [facility] ([facility] ) [address]  [address]  [facility] [address]  [phone]

## 2023-10-05 ASSESSMENT — ENCOUNTER SYMPTOMS
SHORTNESS OF BREATH: 0
HEMATURIA: 0
CHILLS: 0
PARESTHESIAS: 0
DYSURIA: 0
ARTHRALGIAS: 1
BREAST MASS: 0
HEMATOCHEZIA: 0
PALPITATIONS: 0
COUGH: 0
HEADACHES: 1
WEAKNESS: 0
NAUSEA: 0
EYE PAIN: 0
NERVOUS/ANXIOUS: 0
JOINT SWELLING: 0
MYALGIAS: 0
ABDOMINAL PAIN: 0
DIZZINESS: 1
SORE THROAT: 0
FEVER: 0
CONSTIPATION: 0
FREQUENCY: 0
HEARTBURN: 0
DIARRHEA: 0

## 2023-10-06 ENCOUNTER — OFFICE VISIT (OUTPATIENT)
Dept: INTERNAL MEDICINE | Facility: CLINIC | Age: 53
End: 2023-10-06
Payer: COMMERCIAL

## 2023-10-06 VITALS
WEIGHT: 158 LBS | BODY MASS INDEX: 25.39 KG/M2 | DIASTOLIC BLOOD PRESSURE: 74 MMHG | TEMPERATURE: 97.6 F | SYSTOLIC BLOOD PRESSURE: 107 MMHG | OXYGEN SATURATION: 99 % | HEIGHT: 66 IN | RESPIRATION RATE: 18 BRPM | HEART RATE: 74 BPM

## 2023-10-06 DIAGNOSIS — E78.5 HYPERLIPIDEMIA LDL GOAL <130: ICD-10-CM

## 2023-10-06 DIAGNOSIS — D50.9 IRON DEFICIENCY ANEMIA, UNSPECIFIED IRON DEFICIENCY ANEMIA TYPE: ICD-10-CM

## 2023-10-06 DIAGNOSIS — Z11.59 NEED FOR HEPATITIS C SCREENING TEST: ICD-10-CM

## 2023-10-06 DIAGNOSIS — Z28.21 INFLUENZA VACCINATION DECLINED: ICD-10-CM

## 2023-10-06 DIAGNOSIS — Z00.00 ANNUAL PHYSICAL EXAM: Primary | ICD-10-CM

## 2023-10-06 DIAGNOSIS — G95.9 CERVICAL MYELOPATHY (H): ICD-10-CM

## 2023-10-06 DIAGNOSIS — Z23 NEED FOR TDAP VACCINATION: ICD-10-CM

## 2023-10-06 DIAGNOSIS — E03.9 HYPOTHYROIDISM, UNSPECIFIED TYPE: ICD-10-CM

## 2023-10-06 DIAGNOSIS — Z11.4 SCREENING FOR HIV (HUMAN IMMUNODEFICIENCY VIRUS): ICD-10-CM

## 2023-10-06 LAB
ALBUMIN SERPL BCG-MCNC: 4.3 G/DL (ref 3.5–5.2)
ALP SERPL-CCNC: 100 U/L (ref 35–104)
ALT SERPL W P-5'-P-CCNC: 10 U/L (ref 0–50)
ANION GAP SERPL CALCULATED.3IONS-SCNC: 10 MMOL/L (ref 7–15)
AST SERPL W P-5'-P-CCNC: 23 U/L (ref 0–45)
BASO+EOS+MONOS # BLD AUTO: ABNORMAL 10*3/UL
BASO+EOS+MONOS NFR BLD AUTO: ABNORMAL %
BASOPHILS # BLD AUTO: 0 10E3/UL (ref 0–0.2)
BASOPHILS NFR BLD AUTO: 1 %
BILIRUB SERPL-MCNC: 0.3 MG/DL
BUN SERPL-MCNC: 11.5 MG/DL (ref 6–20)
CALCIUM SERPL-MCNC: 9.8 MG/DL (ref 8.6–10)
CHLORIDE SERPL-SCNC: 107 MMOL/L (ref 98–107)
CHOLEST SERPL-MCNC: 194 MG/DL
CREAT SERPL-MCNC: 0.82 MG/DL (ref 0.51–0.95)
DEPRECATED HCO3 PLAS-SCNC: 26 MMOL/L (ref 22–29)
EGFRCR SERPLBLD CKD-EPI 2021: 84 ML/MIN/1.73M2
EOSINOPHIL # BLD AUTO: 0.2 10E3/UL (ref 0–0.7)
EOSINOPHIL NFR BLD AUTO: 4 %
ERYTHROCYTE [DISTWIDTH] IN BLOOD BY AUTOMATED COUNT: 15.3 % (ref 10–15)
FERRITIN SERPL-MCNC: 13 NG/ML (ref 11–328)
GLUCOSE SERPL-MCNC: 86 MG/DL (ref 70–99)
HCT VFR BLD AUTO: 36.8 % (ref 35–47)
HCV AB SERPL QL IA: NONREACTIVE
HDLC SERPL-MCNC: 68 MG/DL
HGB BLD-MCNC: 11.8 G/DL (ref 11.7–15.7)
HIV 1+2 AB+HIV1 P24 AG SERPL QL IA: NONREACTIVE
IMM GRANULOCYTES # BLD: 0 10E3/UL
IMM GRANULOCYTES NFR BLD: 0 %
IRON BINDING CAPACITY (ROCHE): 314 UG/DL (ref 240–430)
IRON SATN MFR SERPL: 12 % (ref 15–46)
IRON SERPL-MCNC: 37 UG/DL (ref 37–145)
LDLC SERPL CALC-MCNC: 112 MG/DL
LYMPHOCYTES # BLD AUTO: 1.9 10E3/UL (ref 0.8–5.3)
LYMPHOCYTES NFR BLD AUTO: 44 %
MCH RBC QN AUTO: 25.9 PG (ref 26.5–33)
MCHC RBC AUTO-ENTMCNC: 32.1 G/DL (ref 31.5–36.5)
MCV RBC AUTO: 81 FL (ref 78–100)
MONOCYTES # BLD AUTO: 0.3 10E3/UL (ref 0–1.3)
MONOCYTES NFR BLD AUTO: 7 %
NEUTROPHILS # BLD AUTO: 2 10E3/UL (ref 1.6–8.3)
NEUTROPHILS NFR BLD AUTO: 45 %
NONHDLC SERPL-MCNC: 126 MG/DL
PLATELET # BLD AUTO: 206 10E3/UL (ref 150–450)
POTASSIUM SERPL-SCNC: 4.1 MMOL/L (ref 3.4–5.3)
PROT SERPL-MCNC: 6.8 G/DL (ref 6.4–8.3)
RBC # BLD AUTO: 4.55 10E6/UL (ref 3.8–5.2)
SODIUM SERPL-SCNC: 143 MMOL/L (ref 135–145)
T4 FREE SERPL-MCNC: 2.06 NG/DL (ref 0.9–1.7)
TRIGL SERPL-MCNC: 70 MG/DL
TSH SERPL DL<=0.005 MIU/L-ACNC: 0.01 UIU/ML (ref 0.3–4.2)
WBC # BLD AUTO: 4.5 10E3/UL (ref 4–11)

## 2023-10-06 PROCEDURE — 90471 IMMUNIZATION ADMIN: CPT | Performed by: INTERNAL MEDICINE

## 2023-10-06 PROCEDURE — 86803 HEPATITIS C AB TEST: CPT | Performed by: INTERNAL MEDICINE

## 2023-10-06 PROCEDURE — 84439 ASSAY OF FREE THYROXINE: CPT | Performed by: INTERNAL MEDICINE

## 2023-10-06 PROCEDURE — 87389 HIV-1 AG W/HIV-1&-2 AB AG IA: CPT | Performed by: INTERNAL MEDICINE

## 2023-10-06 PROCEDURE — 85025 COMPLETE CBC W/AUTO DIFF WBC: CPT | Performed by: INTERNAL MEDICINE

## 2023-10-06 PROCEDURE — 36415 COLL VENOUS BLD VENIPUNCTURE: CPT | Performed by: INTERNAL MEDICINE

## 2023-10-06 PROCEDURE — 99396 PREV VISIT EST AGE 40-64: CPT | Mod: 25 | Performed by: INTERNAL MEDICINE

## 2023-10-06 PROCEDURE — 82728 ASSAY OF FERRITIN: CPT | Performed by: INTERNAL MEDICINE

## 2023-10-06 PROCEDURE — 80061 LIPID PANEL: CPT | Performed by: INTERNAL MEDICINE

## 2023-10-06 PROCEDURE — 84443 ASSAY THYROID STIM HORMONE: CPT | Performed by: INTERNAL MEDICINE

## 2023-10-06 PROCEDURE — 90715 TDAP VACCINE 7 YRS/> IM: CPT | Performed by: INTERNAL MEDICINE

## 2023-10-06 PROCEDURE — 83550 IRON BINDING TEST: CPT | Performed by: INTERNAL MEDICINE

## 2023-10-06 PROCEDURE — 83540 ASSAY OF IRON: CPT | Performed by: INTERNAL MEDICINE

## 2023-10-06 PROCEDURE — 80053 COMPREHEN METABOLIC PANEL: CPT | Performed by: INTERNAL MEDICINE

## 2023-10-06 ASSESSMENT — ENCOUNTER SYMPTOMS
MYALGIAS: 0
CHILLS: 0
HEMATOCHEZIA: 0
ARTHRALGIAS: 1
PALPITATIONS: 0
FEVER: 0
NAUSEA: 0
SHORTNESS OF BREATH: 0
BREAST MASS: 0
FREQUENCY: 0
COUGH: 0
HEARTBURN: 0
HEADACHES: 1
EYE PAIN: 0
JOINT SWELLING: 0
DIARRHEA: 0
SORE THROAT: 0
NERVOUS/ANXIOUS: 0
DYSURIA: 0
WEAKNESS: 0
HEMATURIA: 0
DIZZINESS: 1
PARESTHESIAS: 0
ABDOMINAL PAIN: 0
CONSTIPATION: 0

## 2023-10-06 ASSESSMENT — ANXIETY QUESTIONNAIRES
GAD7 TOTAL SCORE: 4
7. FEELING AFRAID AS IF SOMETHING AWFUL MIGHT HAPPEN: NOT AT ALL
IF YOU CHECKED OFF ANY PROBLEMS ON THIS QUESTIONNAIRE, HOW DIFFICULT HAVE THESE PROBLEMS MADE IT FOR YOU TO DO YOUR WORK, TAKE CARE OF THINGS AT HOME, OR GET ALONG WITH OTHER PEOPLE: NOT DIFFICULT AT ALL
1. FEELING NERVOUS, ANXIOUS, OR ON EDGE: SEVERAL DAYS
2. NOT BEING ABLE TO STOP OR CONTROL WORRYING: NOT AT ALL
5. BEING SO RESTLESS THAT IT IS HARD TO SIT STILL: NOT AT ALL
4. TROUBLE RELAXING: SEVERAL DAYS
GAD7 TOTAL SCORE: 4
3. WORRYING TOO MUCH ABOUT DIFFERENT THINGS: SEVERAL DAYS
6. BECOMING EASILY ANNOYED OR IRRITABLE: SEVERAL DAYS

## 2023-10-06 NOTE — PROGRESS NOTES
SUBJECTIVE:   CC: Carlie is an 53 year old who presents for preventive health visit.       Patient is a 53-year-old  female who presents to the clinic for annual physical.  She has a history of hypothyroidism and iron deficiency anemia.  Patient has been taking levothyroxine 150 mcg daily, and her last TSH was noted to be 3.71 in June 2022.  Her previous iron levels were collected in October 2022, and her ferritin was noted to be 7 with an iron level, 28.  Patient had been on iron supplementation, but she did stop taking a few months ago as she was having too much issues with an upset stomach.  Patient would like to have all of her lab work updated today.  She is fasting.  Patient reports stable appetite.  She is stooling voiding per usual routine.  Patient would like to update her tetanus immunization, but she did defer the influenza and COVID vaccinations at this time.    Healthy Habits:     Getting at least 3 servings of Calcium per day:  NO    Bi-annual eye exam:  NO    Dental care twice a year:  NO    Sleep apnea or symptoms of sleep apnea:  Daytime drowsiness    Diet:  Regular (no restrictions)    Frequency of exercise:  1 day/week    Duration of exercise:  N/A    Taking medications regularly:  Yes    Medication side effects:  Other    Additional concerns today:  Yes      Today's PHQ-2 Score:       10/5/2023    10:56 PM   PHQ-2 ( 1999 [employer])   Q1: Little interest or pleasure in doing things 1   Q2: Feeling down, depressed or hopeless 1   PHQ-2 Score 2   Q1: Little interest or pleasure in doing things Several days   Q2: Feeling down, depressed or hopeless Several days   PHQ-2 Score 2       Social History     Tobacco Use    Smoking status: Never    Smokeless tobacco: Never   Substance Use Topics    Alcohol use: Yes     Alcohol/week: 0.0 standard drinks of alcohol     Comment: rare           10/5/2023    10:55 PM   Alcohol Use   Prescreen: >3 drinks/day or >7 drinks/week? No     Reviewed orders with  patient.  Reviewed health maintenance and updated orders accordingly - Yes  Lab work is in process    Breast Cancer Screening:    FHS-7:       7/1/2022    11:43 AM 8/30/2023     2:48 PM   Breast CA Risk Assessment (FHS-7)   Did any of your first-degree relatives have breast or ovarian cancer? No No   Did any of your relatives have bilateral breast cancer? No No   Did any man in your family have breast cancer? No No   Did any woman in your family have breast and ovarian cancer? No No   Did any woman in your family have breast cancer before age 50 y? No No   Do you have 2 or more relatives with breast and/or ovarian cancer? No No   Do you have 2 or more relatives with breast and/or bowel cancer? No No       Mammogram Screening: Recommended mammography every 1-2 years with patient discussion and risk factor consideration  Pertinent mammograms are reviewed under the imaging tab.    History of abnormal Pap smear: NO - age 30-65 PAP every 5 years with negative HPV co-testing recommended     Reviewed and updated as needed this visit by clinical staff   Tobacco  Allergies  Meds              Reviewed and updated as needed this visit by Provider                   Review of Systems   Constitutional:  Negative for chills and fever.   HENT:  Negative for congestion, ear pain, hearing loss and sore throat.    Eyes:  Negative for pain and visual disturbance.   Respiratory:  Negative for cough and shortness of breath.    Cardiovascular:  Positive for peripheral edema. Negative for chest pain and palpitations.   Gastrointestinal:  Negative for abdominal pain, constipation, diarrhea, heartburn, hematochezia and nausea.   Breasts:  Negative for tenderness, breast mass and discharge.   Genitourinary:  Positive for urgency. Negative for dysuria, frequency, genital sores, hematuria, pelvic pain, vaginal bleeding and vaginal discharge.   Musculoskeletal:  Positive for arthralgias. Negative for joint swelling and myalgias.   Skin:   "Negative for rash.   Neurological:  Positive for dizziness and headaches. Negative for weakness and paresthesias.   Psychiatric/Behavioral:  Negative for mood changes. The patient is not nervous/anxious.         OBJECTIVE:   Blood pressure 107/74, pulse 74, temperature 97.6  F (36.4  C), resp. rate 18, height 1.676 m (5' 6\"), weight 71.7 kg (158 lb), last menstrual period 06/02/2011, SpO2 99 %, not currently breastfeeding.    Physical Exam  Vitals reviewed.   HENT:      Head: Normocephalic and atraumatic.      Right Ear: Tympanic membrane, ear canal and external ear normal.      Left Ear: Tympanic membrane, ear canal and external ear normal.      Mouth/Throat:      Mouth: Mucous membranes are moist.      Pharynx: Oropharynx is clear.   Eyes:      Extraocular Movements: Extraocular movements intact.      Conjunctiva/sclera: Conjunctivae normal.   Cardiovascular:      Rate and Rhythm: Normal rate and regular rhythm.      Pulses: Normal pulses.      Heart sounds: Normal heart sounds.   Pulmonary:      Effort: Pulmonary effort is normal.      Breath sounds: Normal breath sounds.   Abdominal:      General: Bowel sounds are normal.      Palpations: Abdomen is soft.   Musculoskeletal:         General: Normal range of motion.      Cervical back: Normal range of motion and neck supple.   Skin:     General: Skin is warm and dry.      Capillary Refill: Capillary refill takes less than 2 seconds.   Neurological:      General: No focal deficit present.      Mental Status: She is alert and oriented to person, place, and time. Mental status is at baseline.     Diagnostic testing: CMP, CBC, FLP, TSH, iron studies, and ferritin levels are pending.    ASSESSMENT/PLAN:   (Z00.00) Annual physical exam  (primary encounter diagnosis)  Comment: At this time, patient does have a relatively unremarkable physical examination.  Her blood pressure is noted to be as an acceptable level.  We did spend some time discussing appropriate dietary and " lifestyle modifications help keep her weight and blood pressure under good control.  Fasting labs are pending.  All health maintenance items are addressed.    (Z11.4) Screening for HIV (human immunodeficiency virus)  Comment: HIV Antigen Antibody Combo    (Z11.59) Need for hepatitis C screening test  Comment: Hepatitis C Screen Reflex to HCV RNA Quant and         Genotype    (E03.9) Hypothyroidism, unspecified type  Comment: Patient does have a follow-up TSH that is currently pending.  While we are waiting results of her labs, she will continue her levothyroxine 150 mcg daily.  Signs and symptoms of inappropriate treated thyroid disease were reviewed.  Further recommendations will be made once her lab results are available for review.    (G95.9) Cervical myelopathy (H)  Comment: Previously managed by neurosurgery.    (Z23) Need for Tdap vaccination  Comment: Tdap immunization administered.    (D50.9) Iron deficiency anemia, unspecified iron deficiency anemia type  Comment: Patient has not been taking her iron supplement as she did have difficulties with upset stomach when taking iron supplements.  She does have a CBC as well as iron studies that are currently pending.  Patient be contacted once her lab results are available for review.  Further recommendations in regards to ongoing iron supplementation will be made at that time.    (Z28.21) Influenza vaccination declined    (E78.5) HYPERLIPIDEMIA LDL GOAL <130  Comment: Patient does have a fasting lipid panel that is pending as well.  Dietary modifications for improved cholesterol control were reviewed.  Further recommendations were made once her cholesterol panel results have returned.    Patient has been advised of split billing requirements and indicates understanding: Yes      COUNSELING:  Reviewed preventive health counseling, as reflected in patient instructions      BMI:   Estimated body mass index is 25.82 kg/m  as calculated from the following:    Height as  "of 11/25/22: 1.676 m (5' 6\").    Weight as of 11/25/22: 72.6 kg (160 lb).         She reports that she has never smoked. She has never used smokeless tobacco.        Ollie Joseph MD  [facility] submitted by the patient for this visit:  TAJ-7 (Submitted on 10/6/2023)  TAJ 7 TOTAL SCORE: 4    "

## 2023-10-09 ENCOUNTER — TELEPHONE (OUTPATIENT)
Dept: INTERNAL MEDICINE | Facility: CLINIC | Age: 53
End: 2023-10-09
Payer: COMMERCIAL

## 2023-10-09 NOTE — TELEPHONE ENCOUNTER
I contacted the financial department, and no charge will be associated with the infectious disease screenings.  I also called Carlie to discuss the matter, and all of her concerns were addressed.

## 2023-10-09 NOTE — TELEPHONE ENCOUNTER
"  Patient stating she is ok with the medication change for levothyroxine -see 10/6/23 result note.     Please send rx to [facility] in [place].     The patient states she was not aware that Hep C and HIV test was ordered and wants to know why, advised ordered as screenings.     The patient states she does not want to pay for a test that she did not need.      Patient states she \"would like an answer back on the testing because she is going to end of paying for it.\"    "

## 2023-10-12 DIAGNOSIS — E05.00 GRAVES DISEASE: ICD-10-CM

## 2023-10-12 RX ORDER — LEVOTHYROXINE SODIUM 150 UG/1
150 TABLET ORAL DAILY
Qty: 30 TABLET | Refills: 0 | Status: SHIPPED | OUTPATIENT
Start: 2023-10-12 | End: 2023-10-16

## 2023-10-16 ENCOUNTER — TELEPHONE (OUTPATIENT)
Dept: INTERNAL MEDICINE | Facility: CLINIC | Age: 53
End: 2023-10-16
Payer: COMMERCIAL

## 2023-10-16 DIAGNOSIS — E03.9 HYPOTHYROIDISM, UNSPECIFIED TYPE: Primary | ICD-10-CM

## 2023-10-16 DIAGNOSIS — E05.00 GRAVES DISEASE: ICD-10-CM

## 2023-10-16 RX ORDER — LEVOTHYROXINE SODIUM 100 UG/1
100 TABLET ORAL DAILY
Qty: 90 TABLET | Refills: 0 | Status: SHIPPED | OUTPATIENT
Start: 2023-10-16 | End: 2023-10-17

## 2023-10-16 NOTE — TELEPHONE ENCOUNTER
Will send a 90-day supply for a reduced dose.   Need to recheck thyroid labs before additional refills, in about two months (future lab orders also placed).     Please advise pt.

## 2023-10-16 NOTE — TELEPHONE ENCOUNTER
FYI - Status Update    Who is Calling: patient    Update: Pt stated that the prescription for levothyroxine (SYNTHROID/LEVOTHROID) was sent at the normal dose when it should have been sent at a lower dose. Looking to get 3 month refill if possible as well.     Does caller want a call/response back: Yes     Could we send this information to you in [employer] or would you prefer to receive a phone call?:   Patient would prefer a phone call   Okay to leave a detailed message?: Yes at [phone]

## 2023-10-16 NOTE — TELEPHONE ENCOUNTER
"Called patient to inform her of dose change. Patient states Dr. Joseph's note to her recommended a different dose. Per Dr. Joseph's 10/6/23 TSH/T4 result note:  \"I would recommend that we decrease your levothyroxine to 137 mcg daily.\"    Routed back to Dr. Dillon, to review dosing for levothyroxine.     Ayleen Bryant RN  [facility]   "

## 2023-10-17 RX ORDER — LEVOTHYROXINE SODIUM 137 UG/1
137 TABLET ORAL DAILY
Qty: 90 TABLET | Refills: 0 | Status: SHIPPED | OUTPATIENT
Start: 2023-10-17 | End: 2024-01-20

## 2023-10-17 NOTE — TELEPHONE ENCOUNTER
Please advise pt:  Was not aware that he had specified a dose. Sent a new Rx for this requested dose. Still need to recheck thyroid labs after taking new dose for two months.

## 2023-12-18 ENCOUNTER — LAB (OUTPATIENT)
Dept: LAB | Facility: CLINIC | Age: 53
End: 2023-12-18
Payer: COMMERCIAL

## 2023-12-18 DIAGNOSIS — E03.9 HYPOTHYROIDISM, UNSPECIFIED TYPE: ICD-10-CM

## 2023-12-18 LAB
T4 FREE SERPL-MCNC: 1.37 NG/DL (ref 0.9–1.7)
TSH SERPL DL<=0.005 MIU/L-ACNC: 0.03 UIU/ML (ref 0.3–4.2)

## 2023-12-18 PROCEDURE — 84443 ASSAY THYROID STIM HORMONE: CPT

## 2023-12-18 PROCEDURE — 36415 COLL VENOUS BLD VENIPUNCTURE: CPT

## 2023-12-18 PROCEDURE — 84439 ASSAY OF FREE THYROXINE: CPT

## 2024-01-13 DIAGNOSIS — E03.9 HYPOTHYROIDISM, UNSPECIFIED TYPE: Primary | ICD-10-CM

## 2024-01-13 DIAGNOSIS — E05.00 GRAVES DISEASE: ICD-10-CM

## 2024-01-20 RX ORDER — LEVOTHYROXINE SODIUM 125 UG/1
125 TABLET ORAL DAILY
Qty: 90 TABLET | Refills: 0 | Status: SHIPPED | OUTPATIENT
Start: 2024-01-20 | End: 2024-04-30

## 2024-01-20 NOTE — TELEPHONE ENCOUNTER
Refilled Rx.   Dose reduced based on most recent labs. Recheck labs prior to next refill to assure correct dose.

## 2024-04-18 DIAGNOSIS — E05.00 GRAVES DISEASE: ICD-10-CM

## 2024-04-18 DIAGNOSIS — D50.9 IRON DEFICIENCY ANEMIA, UNSPECIFIED IRON DEFICIENCY ANEMIA TYPE: ICD-10-CM

## 2024-04-18 DIAGNOSIS — R53.83 FATIGUE, UNSPECIFIED TYPE: Primary | ICD-10-CM

## 2024-04-25 NOTE — TELEPHONE ENCOUNTER
Patient calls back.     Scheduled for labs tomorrow at 2:30 - if able, patient asks to have her iron level rechecked, feeling more tired, low energy lately.    Routing to provider to review and advise.     Rita Garcia RN  [facility]

## 2024-04-26 ENCOUNTER — LAB (OUTPATIENT)
Dept: LAB | Facility: CLINIC | Age: 54
End: 2024-04-26
Payer: COMMERCIAL

## 2024-04-26 DIAGNOSIS — E03.9 HYPOTHYROIDISM, UNSPECIFIED TYPE: ICD-10-CM

## 2024-04-26 PROCEDURE — 84439 ASSAY OF FREE THYROXINE: CPT

## 2024-04-26 PROCEDURE — 83540 ASSAY OF IRON: CPT | Performed by: INTERNAL MEDICINE

## 2024-04-26 PROCEDURE — 83550 IRON BINDING TEST: CPT | Performed by: INTERNAL MEDICINE

## 2024-04-26 PROCEDURE — 36415 COLL VENOUS BLD VENIPUNCTURE: CPT

## 2024-04-26 PROCEDURE — 84443 ASSAY THYROID STIM HORMONE: CPT

## 2024-04-27 LAB
IRON BINDING CAPACITY (ROCHE): 324 UG/DL (ref 240–430)
IRON SATN MFR SERPL: 7 % (ref 15–46)
IRON SERPL-MCNC: 24 UG/DL (ref 37–145)
T4 FREE SERPL-MCNC: 1.57 NG/DL (ref 0.9–1.7)
TSH SERPL DL<=0.005 MIU/L-ACNC: 0.04 UIU/ML (ref 0.3–4.2)

## 2024-04-27 NOTE — TELEPHONE ENCOUNTER
RN: Please call patient.      The thyroid labs did not really clarify with certainty whether 125 mcg or 137 mcg is the best dose.   We may be able to adjust back to 137 mcg each day as prescribed in October if she notes a definite increase in fatigue.     Dr Dillon was able to add iron levels to the specimen in lab drawn on 4/26--results pending at time of sending this note.

## 2024-04-30 RX ORDER — LEVOTHYROXINE SODIUM 125 UG/1
125 TABLET ORAL DAILY
Qty: 90 TABLET | Refills: 0 | Status: SHIPPED | OUTPATIENT
Start: 2024-04-30 | End: 2024-08-05

## 2024-04-30 RX ORDER — FERROUS GLUCONATE 324(38)MG
324 TABLET ORAL
Qty: 100 TABLET | Refills: 0 | Status: SHIPPED | OUTPATIENT
Start: 2024-04-30

## 2024-04-30 NOTE — TELEPHONE ENCOUNTER
Call to patient. Advised. States she has not been able to tolerate iron supplements in the past. States she has had stomach pain in the past.

## 2024-04-30 NOTE — TELEPHONE ENCOUNTER
RN: Please advise pt.     Her iron levels are low.   Recommend adding an iron tablet (ferrous gluconate) once daily. E-prescribed Rx.      Also E-prescribed refill of levothyroxine.

## 2024-04-30 NOTE — TELEPHONE ENCOUNTER
Call to patient. Advised. Iron studies are back. Please review and advise if these results make any changes to the recommendations.

## 2024-07-30 DIAGNOSIS — E05.00 GRAVES DISEASE: ICD-10-CM

## 2024-08-05 RX ORDER — LEVOTHYROXINE SODIUM 125 UG/1
125 TABLET ORAL DAILY
Qty: 90 TABLET | Refills: 0 | Status: SHIPPED | OUTPATIENT
Start: 2024-08-05

## 2024-09-06 ENCOUNTER — PATIENT OUTREACH (OUTPATIENT)
Dept: CARE COORDINATION | Facility: CLINIC | Age: 54
End: 2024-09-06
Payer: COMMERCIAL

## 2024-09-20 ENCOUNTER — PATIENT OUTREACH (OUTPATIENT)
Dept: CARE COORDINATION | Facility: CLINIC | Age: 54
End: 2024-09-20
Payer: COMMERCIAL

## 2024-10-07 ENCOUNTER — HOSPITAL ENCOUNTER (OUTPATIENT)
Dept: MAMMOGRAPHY | Facility: CLINIC | Age: 54
Discharge: HOME | End: 2024-10-07
Attending: INTERNAL MEDICINE | Admitting: INTERNAL MEDICINE
Payer: COMMERCIAL

## 2024-10-07 DIAGNOSIS — Z12.31 VISIT FOR SCREENING MAMMOGRAM: ICD-10-CM

## 2024-10-07 PROCEDURE — 77063 BREAST TOMOSYNTHESIS BI: CPT

## 2024-10-31 ENCOUNTER — OFFICE VISIT (OUTPATIENT)
Dept: INTERNAL MEDICINE | Facility: CLINIC | Age: 54
End: 2024-10-31
Payer: COMMERCIAL

## 2024-10-31 VITALS
DIASTOLIC BLOOD PRESSURE: 69 MMHG | HEIGHT: 66 IN | SYSTOLIC BLOOD PRESSURE: 110 MMHG | HEART RATE: 89 BPM | TEMPERATURE: 97.6 F | WEIGHT: 158 LBS | BODY MASS INDEX: 25.39 KG/M2 | OXYGEN SATURATION: 100 % | RESPIRATION RATE: 16 BRPM

## 2024-10-31 DIAGNOSIS — E03.9 HYPOTHYROIDISM, UNSPECIFIED TYPE: ICD-10-CM

## 2024-10-31 DIAGNOSIS — E78.5 HYPERLIPIDEMIA LDL GOAL <130: ICD-10-CM

## 2024-10-31 DIAGNOSIS — D50.8 OTHER IRON DEFICIENCY ANEMIA: ICD-10-CM

## 2024-10-31 DIAGNOSIS — E53.8 LOW SERUM VITAMIN B12: ICD-10-CM

## 2024-10-31 DIAGNOSIS — R25.2 LEG CRAMPING: ICD-10-CM

## 2024-10-31 DIAGNOSIS — R25.2 HAND CRAMP: ICD-10-CM

## 2024-10-31 DIAGNOSIS — E05.00 GRAVES DISEASE: ICD-10-CM

## 2024-10-31 DIAGNOSIS — Z00.00 ENCOUNTER FOR PREVENTIVE HEALTH EXAMINATION: Primary | ICD-10-CM

## 2024-10-31 LAB
BASOPHILS # BLD AUTO: 0 10E3/UL (ref 0–0.2)
BASOPHILS NFR BLD AUTO: 1 %
EOSINOPHIL # BLD AUTO: 0.1 10E3/UL (ref 0–0.7)
EOSINOPHIL NFR BLD AUTO: 3 %
ERYTHROCYTE [DISTWIDTH] IN BLOOD BY AUTOMATED COUNT: 15 % (ref 10–15)
HCT VFR BLD AUTO: 35.9 % (ref 35–47)
HGB BLD-MCNC: 11.6 G/DL (ref 11.7–15.7)
IMM GRANULOCYTES # BLD: 0 10E3/UL
IMM GRANULOCYTES NFR BLD: 0 %
LYMPHOCYTES # BLD AUTO: 1.7 10E3/UL (ref 0.8–5.3)
LYMPHOCYTES NFR BLD AUTO: 34 %
MCH RBC QN AUTO: 25.7 PG (ref 26.5–33)
MCHC RBC AUTO-ENTMCNC: 32.3 G/DL (ref 31.5–36.5)
MCV RBC AUTO: 80 FL (ref 78–100)
MONOCYTES # BLD AUTO: 0.4 10E3/UL (ref 0–1.3)
MONOCYTES NFR BLD AUTO: 7 %
NEUTROPHILS # BLD AUTO: 2.8 10E3/UL (ref 1.6–8.3)
NEUTROPHILS NFR BLD AUTO: 56 %
PLATELET # BLD AUTO: 238 10E3/UL (ref 150–450)
RBC # BLD AUTO: 4.51 10E6/UL (ref 3.8–5.2)
WBC # BLD AUTO: 5 10E3/UL (ref 4–11)

## 2024-10-31 PROCEDURE — 80061 LIPID PANEL: CPT | Performed by: NURSE PRACTITIONER

## 2024-10-31 PROCEDURE — 83735 ASSAY OF MAGNESIUM: CPT | Performed by: NURSE PRACTITIONER

## 2024-10-31 PROCEDURE — 99214 OFFICE O/P EST MOD 30 MIN: CPT | Mod: 25 | Performed by: NURSE PRACTITIONER

## 2024-10-31 PROCEDURE — 84439 ASSAY OF FREE THYROXINE: CPT | Performed by: NURSE PRACTITIONER

## 2024-10-31 PROCEDURE — 85025 COMPLETE CBC W/AUTO DIFF WBC: CPT | Performed by: NURSE PRACTITIONER

## 2024-10-31 PROCEDURE — 84443 ASSAY THYROID STIM HORMONE: CPT | Performed by: NURSE PRACTITIONER

## 2024-10-31 PROCEDURE — 80053 COMPREHEN METABOLIC PANEL: CPT | Performed by: NURSE PRACTITIONER

## 2024-10-31 PROCEDURE — 83550 IRON BINDING TEST: CPT | Performed by: NURSE PRACTITIONER

## 2024-10-31 PROCEDURE — 99396 PREV VISIT EST AGE 40-64: CPT | Mod: 25 | Performed by: NURSE PRACTITIONER

## 2024-10-31 PROCEDURE — 82607 VITAMIN B-12: CPT | Performed by: NURSE PRACTITIONER

## 2024-10-31 PROCEDURE — 83540 ASSAY OF IRON: CPT | Performed by: NURSE PRACTITIONER

## 2024-10-31 PROCEDURE — 36415 COLL VENOUS BLD VENIPUNCTURE: CPT | Performed by: NURSE PRACTITIONER

## 2024-10-31 RX ORDER — LEVOTHYROXINE SODIUM 125 UG/1
125 TABLET ORAL DAILY
Qty: 90 TABLET | Refills: 0 | OUTPATIENT
Start: 2024-10-31

## 2024-10-31 RX ORDER — CYCLOBENZAPRINE HCL 5 MG
5 TABLET ORAL
Qty: 30 TABLET | Refills: 2 | Status: SHIPPED | OUTPATIENT
Start: 2024-10-31

## 2024-10-31 RX ORDER — LEVOTHYROXINE SODIUM 125 UG/1
125 TABLET ORAL DAILY
Qty: 90 TABLET | Refills: 3 | Status: SHIPPED | OUTPATIENT
Start: 2024-10-31 | End: 2024-11-01

## 2024-10-31 SDOH — HEALTH STABILITY: PHYSICAL HEALTH: ON AVERAGE, HOW MANY MINUTES DO YOU ENGAGE IN EXERCISE AT THIS LEVEL?: 20 MIN

## 2024-10-31 SDOH — HEALTH STABILITY: PHYSICAL HEALTH: ON AVERAGE, HOW MANY DAYS PER WEEK DO YOU ENGAGE IN MODERATE TO STRENUOUS EXERCISE (LIKE A BRISK WALK)?: 2 DAYS

## 2024-10-31 ASSESSMENT — ANXIETY QUESTIONNAIRES
7. FEELING AFRAID AS IF SOMETHING AWFUL MIGHT HAPPEN: SEVERAL DAYS
IF YOU CHECKED OFF ANY PROBLEMS ON THIS QUESTIONNAIRE, HOW DIFFICULT HAVE THESE PROBLEMS MADE IT FOR YOU TO DO YOUR WORK, TAKE CARE OF THINGS AT HOME, OR GET ALONG WITH OTHER PEOPLE: VERY DIFFICULT
2. NOT BEING ABLE TO STOP OR CONTROL WORRYING: MORE THAN HALF THE DAYS
3. WORRYING TOO MUCH ABOUT DIFFERENT THINGS: MORE THAN HALF THE DAYS
6. BECOMING EASILY ANNOYED OR IRRITABLE: SEVERAL DAYS
5. BEING SO RESTLESS THAT IT IS HARD TO SIT STILL: SEVERAL DAYS
GAD7 TOTAL SCORE: 11
1. FEELING NERVOUS, ANXIOUS, OR ON EDGE: MORE THAN HALF THE DAYS
GAD7 TOTAL SCORE: 11
4. TROUBLE RELAXING: MORE THAN HALF THE DAYS
8. IF YOU CHECKED OFF ANY PROBLEMS, HOW DIFFICULT HAVE THESE MADE IT FOR YOU TO DO YOUR WORK, TAKE CARE OF THINGS AT HOME, OR GET ALONG WITH OTHER PEOPLE?: VERY DIFFICULT
7. FEELING AFRAID AS IF SOMETHING AWFUL MIGHT HAPPEN: SEVERAL DAYS
GAD7 TOTAL SCORE: 11

## 2024-10-31 ASSESSMENT — SOCIAL DETERMINANTS OF HEALTH (SDOH): HOW OFTEN DO YOU GET TOGETHER WITH FRIENDS OR RELATIVES?: ONCE A WEEK

## 2024-10-31 NOTE — PROGRESS NOTES
Preventive Care Visit  [facility]  Mahsa Magana, MARS, Internal Medicine  Oct 31, 2024      Assessment & Plan     Encounter for preventive health examination  Counseling: counseling provided regarding nutrition, regular exercise, general safety and periodic health exams   Mammogram is up to date  Pap not indicated -hx hysterectomy   Colonoscopy up to date   Declined immunizations     - Comprehensive metabolic panel (BMP + Alb, Alk Phos, ALT, AST, Total. Bili, TP); Future  - CBC with platelets and differential; Future  - TSH with free T4 reflex; Future  - Vitamin B12; Future  - Iron and iron binding capacity; Future  - Magnesium; Future  - Lipid panel reflex to direct LDL Fasting; Future  - Comprehensive metabolic panel (BMP + Alb, Alk Phos, ALT, AST, Total. Bili, TP)  - CBC with platelets and differential  - TSH with free T4 reflex  - Vitamin B12  - Iron and iron binding capacity  - Magnesium  - Lipid panel reflex to direct LDL Fasting  - T4 free    Hand cramp  -check lab work     - Comprehensive metabolic panel (BMP + Alb, Alk Phos, ALT, AST, Total. Bili, TP); Future  - CBC with platelets and differential; Future  - Vitamin B12; Future  - Iron and iron binding capacity; Future  - Magnesium; Future  - cyclobenzaprine (FLEXERIL) 5 MG tablet; Take 1 tablet (5 mg) by mouth nightly as needed for muscle spasms.  - Comprehensive metabolic panel (BMP + Alb, Alk Phos, ALT, AST, Total. Bili, TP)  - CBC with platelets and differential  - Vitamin B12  - Iron and iron binding capacity  - Magnesium    Leg cramping  -check labs, can try tonic water     - Comprehensive metabolic panel (BMP + Alb, Alk Phos, ALT, AST, Total. Bili, TP); Future  - CBC with platelets and differential; Future  - Vitamin B12; Future  - Iron and iron binding capacity; Future  - Magnesium; Future  - cyclobenzaprine (FLEXERIL) 5 MG tablet; Take 1 tablet (5 mg) by mouth nightly as needed for muscle spasms.  - Comprehensive metabolic  "panel (BMP + Alb, Alk Phos, ALT, AST, Total. Bili, TP)  - CBC with platelets and differential  - Vitamin B12  - Iron and iron binding capacity  - Magnesium    Graves disease  -repeat thyroid labs     - levothyroxine (SYNTHROID/LEVOTHROID) 125 MCG tablet; Take 1 tablet (125 mcg) by mouth daily.  - TSH with free T4 reflex; Future  - TSH with free T4 reflex  - T4 free    Hyperlipidemia LDL goal <130    - Lipid panel reflex to direct LDL Fasting; Future  - Lipid panel reflex to direct LDL Fasting    Other iron deficiency anemia  -unable to tolerate oral iron, consider infusion if still low     - CBC with platelets and differential; Future  - Iron and iron binding capacity; Future  - CBC with platelets and differential  - Iron and iron binding capacity      BMI  Estimated body mass index is 25.85 kg/m  as calculated from the following:    Height as of this encounter: 1.665 m (5' 5.55\").    Weight as of this encounter: 71.7 kg (158 lb).       Counseling  Appropriate preventive services were addressed with this patient via screening, questionnaire, or discussion as appropriate for fall prevention, nutrition, physical activity, Tobacco-use cessation, social engagement, weight loss and cognition.  Checklist reviewing preventive services available has been given to the patient.  Reviewed patient's diet, addressing concerns and/or questions.   She is at risk for lack of exercise and has been provided with information to increase physical activity for the benefit of her well-being.   The patient was instructed to see the dentist every 6 months.           Christophe Sexton is a 54 year old, presenting for the following:  Physical        10/31/2024     9:17 AM   Additional Questions   Roomed by Mckenzie CHINO CMA        DEBBY Sexton presents to the clinic today for annual physical.  In addition to physical she does have some concerns today.  She reports that she has been having problems with neck pain on and off.  She reports it comes up " her neck, its mostly on the right side but is on the left side as well.  This then causes headaches which she does not typically have.  She also reports more problems with cramping in her legs and now hands.  She reports it has been present for a few years but seems to be getting worse.  It is happening around 3-4 times per week in her legs.  She reports it has debilitating pain for around 4 to 5 minutes and then will slowly go away.  She reports that she has had it happen in her hands as well a few times over the past few months.  She also notes feeling more foggy and being forgetful.  She reports her memory just feels off.  It seems to be getting worse.  Patient has had a hysterectomy, never had any hot flashes or menopause symptoms in the past.        Health Care Directive  Patient does not have a Health Care Directive: Discussed advance care planning with patient; however, patient declined at this time.      10/31/2024   General Health   How would you rate your overall physical health? (!) FAIR   Feel stress (tense, anxious, or unable to sleep) Rather much      (!) STRESS CONCERN      10/31/2024   Nutrition   Three or more servings of calcium each day? (!) NO   Diet: Regular (no restrictions)   How many servings of fruit and vegetables per day? (!) 2-3   How many sweetened beverages each day? 0-1            10/31/2024   Exercise   Days per week of moderate/strenous exercise 2 days   Average minutes spent exercising at this level 20 min      (!) EXERCISE CONCERN      10/31/2024   Social Factors   Frequency of gathering with friends or relatives Once a week   Worry food won't last until get money to buy more No   Food not last or not have enough money for food? No   Do you have housing? (Housing is defined as stable permanent housing and does not include staying ouside in a car, in a tent, in an abandoned building, in an overnight shelter, or couch-surfing.) Yes   Are you worried about losing your housing? No    Lack of transportation? No   Unable to get utilities (heat,electricity)? No            10/31/2024   Fall Risk   Fallen 2 or more times in the past year? Yes    Trouble with walking or balance? Yes    Gait Speed Test (Document in seconds) 3       Patient-reported          10/31/2024   Dental   Dentist two times every year? (!) NO            10/31/2024   TB Screening   Were you born outside of the [place]? No            Today's PHQ-2 Score:       10/31/2024     8:40 AM   PHQ-2 ( 1999 [employer])   Q1: Little interest or pleasure in doing things 1    Q2: Feeling down, depressed or hopeless 1    PHQ-2 Score 2    Q1: Little interest or pleasure in doing things Several days   Q2: Feeling down, depressed or hopeless Several days   PHQ-2 Score 2       Patient-reported           10/31/2024   Substance Use   Alcohol more than 3/day or more than 7/wk No   Do you use any other substances recreationally? No        Social History     Tobacco Use    Smoking status: Never    Smokeless tobacco: Never   Vaping Use    Vaping status: Never Used   Substance Use Topics    Alcohol use: Yes     Comment: rare    Drug use: No           8/30/2023   LAST FHS-7 RESULTS   1st degree relative breast or ovarian cancer No   Any relative bilateral breast cancer No   Any male have breast cancer No   Any ONE woman have BOTH breast AND ovarian cancer No   Any woman with breast cancer before 50yrs No   2 or more relatives with breast AND/OR ovarian cancer No   2 or more relatives with breast AND/OR bowel cancer No                   10/31/2024   STI Screening   New sexual partner(s) since last STI/HIV test? No        History of abnormal Pap smear: Status post hysterectomy with removal of cervix and no history of CIN2 or greater or cervical cancer. Health Maintenance and Surgical History updated.       ASCVD Risk   The 10-year ASCVD risk score (Josh SANCHEZ, et al., 2019) is: 1.3%    Values used to calculate the score:      Age: 54 years      Sex: Female    "   Is Non- : No      Diabetic: No      Tobacco smoker: No      Systolic Blood Pressure: 110 mmHg      Is BP treated: No      HDL Cholesterol: 68 mg/dL      Total Cholesterol: 194 mg/dL           Reviewed and updated as needed this visit by Provider                        Review of Systems   Constitutional: Negative.    HENT: Negative.     Respiratory: Negative.     Musculoskeletal:  Positive for neck pain.        Leg/hand cramping    Psychiatric/Behavioral:          Feels foggy, forgetful         Objective    Exam  /69 (BP Location: Left arm, Patient Position: Sitting, Cuff Size: Adult Large)   Pulse 89   Temp 97.6  F (36.4  C) (Oral)   Resp 16   Ht 1.665 m (5' 5.55\")   Wt 71.7 kg (158 lb)   LMP 06/02/2011 (Exact Date)   SpO2 100%   Breastfeeding No   BMI 25.85 kg/m     Estimated body mass index is 25.85 kg/m  as calculated from the following:    Height as of this encounter: 1.665 m (5' 5.55\").    Weight as of this encounter: 71.7 kg (158 lb).    Physical Exam  Vitals and nursing note reviewed.   Constitutional:       General: She is not in acute distress.     Appearance: Normal appearance. She is not ill-appearing.   HENT:      Head: Normocephalic and atraumatic.      Right Ear: Tympanic membrane, ear canal and external ear normal.      Left Ear: Tympanic membrane, ear canal and external ear normal.      Nose: Nose normal.      Mouth/Throat:      Mouth: Mucous membranes are moist.      Pharynx: Oropharynx is clear.   Eyes:      Extraocular Movements: Extraocular movements intact.      Conjunctiva/sclera: Conjunctivae normal.      Pupils: Pupils are equal, round, and reactive to light.   Cardiovascular:      Rate and Rhythm: Normal rate and regular rhythm.      Pulses: Normal pulses.      Heart sounds: Normal heart sounds. No murmur heard.  Pulmonary:      Effort: Pulmonary effort is normal.      Breath sounds: Normal breath sounds.   Abdominal:      General: Bowel sounds are " normal. There is no distension.      Palpations: Abdomen is soft. There is no mass.      Tenderness: There is no abdominal tenderness. There is no guarding.   Musculoskeletal:         General: Normal range of motion.      Cervical back: Normal range of motion.   Skin:     General: Skin is warm and dry.   Neurological:      General: No focal deficit present.      Mental Status: She is alert and oriented to person, place, and time. Mental status is at baseline.   Psychiatric:         Mood and Affect: Mood normal.         Behavior: Behavior normal.           Signed Electronically by: Mahsa Magana CNP    Answers submitted by the patient for this visit:  Patient Health Questionnaire (G7) (Submitted on 10/31/2024)  TAJ 7 TOTAL SCORE: 11

## 2024-11-01 LAB
ALBUMIN SERPL BCG-MCNC: 4.2 G/DL (ref 3.5–5.2)
ALP SERPL-CCNC: 123 U/L (ref 40–150)
ALT SERPL W P-5'-P-CCNC: 16 U/L (ref 0–50)
ANION GAP SERPL CALCULATED.3IONS-SCNC: 9 MMOL/L (ref 7–15)
AST SERPL W P-5'-P-CCNC: 22 U/L (ref 0–45)
BILIRUB SERPL-MCNC: 0.4 MG/DL
BUN SERPL-MCNC: 14.3 MG/DL (ref 6–20)
CALCIUM SERPL-MCNC: 9.4 MG/DL (ref 8.8–10.4)
CHLORIDE SERPL-SCNC: 106 MMOL/L (ref 98–107)
CHOLEST SERPL-MCNC: 224 MG/DL
CREAT SERPL-MCNC: 0.86 MG/DL (ref 0.51–0.95)
EGFRCR SERPLBLD CKD-EPI 2021: 79 ML/MIN/1.73M2
FASTING STATUS PATIENT QL REPORTED: YES
FASTING STATUS PATIENT QL REPORTED: YES
GLUCOSE SERPL-MCNC: 81 MG/DL (ref 70–99)
HCO3 SERPL-SCNC: 25 MMOL/L (ref 22–29)
HDLC SERPL-MCNC: 78 MG/DL
IRON BINDING CAPACITY (ROCHE): 349 UG/DL (ref 240–430)
IRON SATN MFR SERPL: 9 % (ref 15–46)
IRON SERPL-MCNC: 31 UG/DL (ref 37–145)
LDLC SERPL CALC-MCNC: 133 MG/DL
MAGNESIUM SERPL-MCNC: 2 MG/DL (ref 1.7–2.3)
NONHDLC SERPL-MCNC: 146 MG/DL
POTASSIUM SERPL-SCNC: 4.4 MMOL/L (ref 3.4–5.3)
PROT SERPL-MCNC: 7 G/DL (ref 6.4–8.3)
SODIUM SERPL-SCNC: 140 MMOL/L (ref 135–145)
T4 FREE SERPL-MCNC: 1.5 NG/DL (ref 0.9–1.7)
TRIGL SERPL-MCNC: 67 MG/DL
TSH SERPL DL<=0.005 MIU/L-ACNC: 0.04 UIU/ML (ref 0.3–4.2)
VIT B12 SERPL-MCNC: 205 PG/ML (ref 232–1245)

## 2024-11-01 RX ORDER — LEVOTHYROXINE SODIUM 125 UG/1
125 TABLET ORAL DAILY
Qty: 90 TABLET | Refills: 3 | Status: SHIPPED | OUTPATIENT
Start: 2024-11-01

## 2024-11-01 ASSESSMENT — ENCOUNTER SYMPTOMS
CONSTITUTIONAL NEGATIVE: 1
NECK PAIN: 1
RESPIRATORY NEGATIVE: 1

## 2025-04-10 ENCOUNTER — OFFICE VISIT (OUTPATIENT)
Dept: INTERNAL MEDICINE | Facility: CLINIC | Age: 55
End: 2025-04-10
Payer: COMMERCIAL

## 2025-04-10 VITALS
HEIGHT: 66 IN | DIASTOLIC BLOOD PRESSURE: 64 MMHG | RESPIRATION RATE: 18 BRPM | WEIGHT: 161 LBS | TEMPERATURE: 98.6 F | HEART RATE: 92 BPM | OXYGEN SATURATION: 99 % | BODY MASS INDEX: 25.88 KG/M2 | SYSTOLIC BLOOD PRESSURE: 116 MMHG

## 2025-04-10 DIAGNOSIS — M54.50 ACUTE BILATERAL LOW BACK PAIN WITHOUT SCIATICA: Primary | ICD-10-CM

## 2025-04-10 PROCEDURE — 3074F SYST BP LT 130 MM HG: CPT | Performed by: INTERNAL MEDICINE

## 2025-04-10 PROCEDURE — 99214 OFFICE O/P EST MOD 30 MIN: CPT | Performed by: INTERNAL MEDICINE

## 2025-04-10 PROCEDURE — 3078F DIAST BP <80 MM HG: CPT | Performed by: INTERNAL MEDICINE

## 2025-04-10 RX ORDER — OXYCODONE HYDROCHLORIDE 5 MG/1
5 TABLET ORAL EVERY 4 HOURS PRN
Qty: 15 TABLET | Refills: 0 | Status: SHIPPED | OUTPATIENT
Start: 2025-04-10

## 2025-04-10 RX ORDER — CEPHALEXIN 500 MG/1
500 CAPSULE ORAL
COMMUNITY
Start: 2025-04-07 | End: 2025-04-14

## 2025-04-10 NOTE — LETTER
4/10/2025    Carlie Brumfield   1970        To Whom it May Concern;    Please excuse Carlie Brumfield from work for an acute medical condition. She was seen in our office today on Apr 10, 2025. Return to work date not yet determined.     Sincerely,        Grupo Dillon MD,

## 2025-04-10 NOTE — PROGRESS NOTES
"Face to face time with patient: 25 minutes (6166---1814)   Time spent in chart review/documentation date of visit: >5 minutes    Assessment & Plan     {Diag Picklist:912196}        MED REC REQUIRED  Post Medication Reconciliation Status: discharge medications reconciled and changed, per note/orders  BMI  Estimated body mass index is 26.38 kg/m  as calculated from the following:    Height as of this encounter: 1.664 m (5' 5.5\").    Weight as of this encounter: 73 kg (161 lb).       {FOLLOW UP PLANS (Optional) Includes COVID19 Treatment Plan:140434}    Christophe Sexton is a 55 year old, presenting for the following health issues:  ER F/U      4/10/2025     5:17 PM   Additional Questions   Roomed by Mckenzie CHINO CMA     [facility]        ED/UC Followup:    Facility:  [facility]  Date of visit: 4/07/2025  Reason for visit: low back pain  Current Status: Unimproved.            Past medical, family and social histories as well as medications reviewed and updated as needed.      REVIEW OF SYSTEMS: The following systems have been completely reviewed and are negative except as noted above:   Constitutional, gastrointestinal, genitourinary, musculoskeletal, and neurologic systems.        Objective    /64 (BP Location: Right arm, Patient Position: Sitting, Cuff Size: Adult Regular)   Pulse 92   Temp 98.6  F (37  C) (Tympanic)   Resp 18   Ht 1.664 m (5' 5.5\")   Wt 73 kg (161 lb)   LMP 06/02/2011 (Exact Date)   SpO2 99%   Breastfeeding No   BMI 26.38 kg/m    Body mass index is 26.38 kg/m .    Physical Exam   {Exam List (Optional):315474}    {Diagnostic Test Results (Optional):221234}        Signed Electronically by: Grupo Dillon MD,   {Email feedback regarding this note to [email or web address]   :[id number]}  " "4/07/2025  Reason for visit: low back pain  Current Status: Unimproved.    - Experienced bad pain and nausea, possibly related to back or bladder issues.  - Prone to urinary tract infections (UTIs), with past lab results showing white blood cells in urine.  Presented to  on 4/7--UA results equivocal but antibiotics started--still taking.       Pain began on Sunday morning while standing at the sink, without any specific trigger.  - History of back pain, previously managed with muscle relaxants, heat, cold, and Biofreeze.  - Underwent an ablation procedure 2 years ago, but current pain is different.  - Pain constant since onset, rated at least 5/10, increasing to 8-9/10 with movement.  - Pain located in the lower lumbar region, adjacent to the sacrum, and on the right side.  - Pain exacerbated by sitting and walking, with severe episodes triggered by coughing.  - No history of surgery on the back.  - Previous medications for pain include tramadol and oxycodone, with tramadol reportedly worsening pain.        Past medical, family and social histories as well as medications reviewed and updated as needed.      REVIEW OF SYSTEMS: The following systems have been completely reviewed and are negative except as noted above:   Constitutional, gastrointestinal, genitourinary, musculoskeletal, and neurologic systems.        Objective    /64 (BP Location: Right arm, Patient Position: Sitting, Cuff Size: Adult Regular)   Pulse 92   Temp 98.6  F (37  C) (Tympanic)   Resp 18   Ht 1.664 m (5' 5.5\")   Wt 73 kg (161 lb)   LMP 06/02/2011 (Exact Date)   SpO2 99%   Breastfeeding No   BMI 26.38 kg/m    Body mass index is 26.38 kg/m .    Physical Exam   GENERAL: alert, uncomfortable with antalgic gait  MS: no gross musculoskeletal defects noted, no edema  NEURO: Normal strength and tone, mentation intact and speech normal  Comprehensive back pain exam:   patient points to b/l SI joint and lower lumbar spine to localize " pain , Lower extremity strength functional and equal on both sides, and Lower extremity sensation normal and equal on both sides           Contains abnormal data UA W/ SEDIMENT EXAM REFLEXED PER CRITERIA [52184.2] - STAT  Order: [id number]  Component  Ref Range & Units 5 d ago   COLOR  YELLOW YELLOW   APPEARANCE  CLEAR CLOUDY Abnormal    SPECIFIC GRAVITY  1.001 - 1.035 1.025   PH  5.0 - 8.0 5.5   GLUCOSE  NEGATIVE NEGATIVE   BILIRUBIN  NEGATIVE NEGATIVE   KETONES  NEGATIVE NEGATIVE   OCCULT BLOOD  NEGATIVE NEGATIVE   PROTEIN  NEGATIVE NEGATIVE   NITRITE  NEGATIVE POSITIVE Abnormal    LEUKOCYTE ESTERASE  NEGATIVE NEGATIVE   WBC UA  < OR = 5 /HPF 0-5   RBC UA  < OR = 2 /HPF NONE SEEN   SQUAMOUS EPITHELIAL CELLS UA  < OR = 5 /HPF 0-5   BACTERIA UA  NONE SEEN /HPF MANY Abnormal    NOTE UA    Comment: This urine was analyzed for the presence of WBC,  RBC, bacteria, casts, and other formed elements.  Only those elements seen were reported.       Signed Electronically by: Grupo Dillon MD,

## 2025-04-11 ENCOUNTER — ANCILLARY PROCEDURE (OUTPATIENT)
Dept: GENERAL RADIOLOGY | Facility: CLINIC | Age: 55
End: 2025-04-11
Attending: PHYSICAL MEDICINE & REHABILITATION
Payer: COMMERCIAL

## 2025-04-11 ENCOUNTER — OFFICE VISIT (OUTPATIENT)
Dept: ORTHOPEDICS | Facility: CLINIC | Age: 55
End: 2025-04-11
Attending: INTERNAL MEDICINE
Payer: COMMERCIAL

## 2025-04-11 VITALS
DIASTOLIC BLOOD PRESSURE: 83 MMHG | HEART RATE: 68 BPM | OXYGEN SATURATION: 100 % | HEIGHT: 66 IN | SYSTOLIC BLOOD PRESSURE: 130 MMHG | WEIGHT: 160 LBS | BODY MASS INDEX: 25.71 KG/M2

## 2025-04-11 DIAGNOSIS — M47.817 FACET ARTHROPATHY, LUMBOSACRAL: ICD-10-CM

## 2025-04-11 DIAGNOSIS — M51.27 LUMBOSACRAL DISC HERNIATION: ICD-10-CM

## 2025-04-11 DIAGNOSIS — M53.3 PAIN OF RIGHT SACROILIAC JOINT: ICD-10-CM

## 2025-04-11 DIAGNOSIS — M54.50 ACUTE BILATERAL LOW BACK PAIN WITHOUT SCIATICA: ICD-10-CM

## 2025-04-11 DIAGNOSIS — M51.27 LUMBOSACRAL DISC HERNIATION: Primary | ICD-10-CM

## 2025-04-11 DIAGNOSIS — M51.370 DEGENERATION OF INTERVERTEBRAL DISC OF LUMBOSACRAL REGION WITH DISCOGENIC BACK PAIN: ICD-10-CM

## 2025-04-11 PROCEDURE — 3079F DIAST BP 80-89 MM HG: CPT | Performed by: PHYSICAL MEDICINE & REHABILITATION

## 2025-04-11 PROCEDURE — 72110 X-RAY EXAM L-2 SPINE 4/>VWS: CPT | Mod: TC | Performed by: RADIOLOGY

## 2025-04-11 PROCEDURE — 99417 PROLNG OP E/M EACH 15 MIN: CPT | Performed by: PHYSICAL MEDICINE & REHABILITATION

## 2025-04-11 PROCEDURE — 99215 OFFICE O/P EST HI 40 MIN: CPT | Performed by: PHYSICAL MEDICINE & REHABILITATION

## 2025-04-11 PROCEDURE — 3075F SYST BP GE 130 - 139MM HG: CPT | Performed by: PHYSICAL MEDICINE & REHABILITATION

## 2025-04-11 PROCEDURE — 1125F AMNT PAIN NOTED PAIN PRSNT: CPT | Performed by: PHYSICAL MEDICINE & REHABILITATION

## 2025-04-11 RX ORDER — PREDNISONE 20 MG/1
40 TABLET ORAL DAILY
Qty: 10 TABLET | Refills: 0 | Status: SHIPPED | OUTPATIENT
Start: 2025-04-11 | End: 2025-04-16

## 2025-04-11 ASSESSMENT — PAIN SCALES - GENERAL: PAINLEVEL_OUTOF10: MODERATE PAIN (6)

## 2025-04-11 NOTE — PATIENT INSTRUCTIONS
Please schedule a follow-up appointment in 1-2 months.  You can schedule this at the , or you can call [phone].    Clinic Fax:  [phone].    For nursing questions, please call [phone].    For medical records, please call [phone].    Please schedule your imaging exams (x-rays, CT, MRI).  The [facility] Imaging Scheduling team will call you to schedule your imaging exam in the next 0-3 days.  You can also call them directly at [phone] to schedule your appointment.    Please schedule an appointment with Physical Therapy.

## 2025-04-11 NOTE — LETTER
"4/11/2025      Carlie Brumfield  [address] [facility] [id number]      Dear Colleague,    Thank you for referring your patient, Carlie Brumfield, to the [facility]. Please see a copy of my visit note below.    PHYSICAL MEDICINE & REHABILITATION / MEDICAL SPINE        Date:  Apr 11, 2025    Name:  Carlie Brumfield  YOB: 1970  MRN:  [id number]      \"I am using a new tool to help me save time with documentation.  Basically it is going to listen to our visit today and uses AI to help me draft my note.  Is it okay if I use this tool today?\"        REASON FOR CONSULTATION:  Acute bilateral low back pain without sciatica--acute pain in bilateral SI and lower lumbar spine.        REFERRING PROVIDER:  Grupo Dillon MD        CHART REVIEW:  Reviewed 04/10/2025 note from Grupo Dillon MD (internal medicine).  Ms. Carlie Brumfield began having pain on 04/06/2025 while standing at the sink.  She had an ablation procedure 2 years ago, but her current pain was different.  Pain was constant since it started.  Pain is located in the right low back, adjacent to the sacrum.  Pain exacerbated by sitting and walking.  Oxycodone was prescribed.  Ibuprofen could be used.  Referral to medical spine was placed.        HISTORY OF PRESENT ILLNESS:  Ms. Carlie Brumfield is a 55-year-old female.  She is .  Ms. Brumfield and her  have a son that graduated from [employer] and a daughter that graduated from the [employer].  Ms. Carlie Brumfield works for [employer].  She is currently working from home.    On 10/05/2022, Ms. Brumfield had fluoroscopic-guided medial branch radiofrequency ablations / neurotomies of the right L3-L4 and L4-L5 facet joints.  On 10/12/2022, Ms. Brumfield had fluoroscopic-guided medial branch radiofrequency ablations / neurotomies of the left L3-L4 and L4-L5 facet joints.  On 11/25/2022, Ms. Carlie Brumfield reported 100% improvement in her low back " "pain.    Ms. Carlie Brumfield states that she had a right knee surgery in the past.  She states she had a right foot surgery in the past.  Ms. Carlie Brumfield denies any other injuries or surgeries of her mid back, low back, pelvis, hips, thighs, knees, legs, ankles, feet, toes.    Ms. Carlie Brumfield denies any personal history of cancers.    Ms. Carlie Brumfield denied any personal or family history of autoimmune diseases, rheumatologic disease, gout, pseudogout.  She denied any personal or family history of neurologic diseases.  Ms. Brumfield denied any personal or family history of inflammatory bowel diseases.     Ms. Carlie Brumfield states that on 04/06/2025 she was in the kitchen.  Ms. Carlie Brumfield was not doing anything unusual.  She was standing at the sink.  Her low back seized up.  Ms. Carlie Brumfield has right greater than left low back/buttock pain.  There is no radiation of this pain.  This pain is constant and described as \"sharp.\"  This pain is worsened more by sitting to standing.  Pain improves with lying down.  Pain has not changed since it began.  Pain is currently rated as 7/10.  Pain varies from 3/10 to 9/10.    Ms. Carlie Brumfield is taking muscle relaxers at night but has not noticed any benefit from that.  She has not yet tried oxycodone.  She states she was also found to have urinary tract infections currently on cephalexin.    Ms. Carlie Brumfield notes worsening pain with coughing, sneezing, driving and hitting a pothole.  Her pain does not keep her awake nor wake her.    Ms. Carlie Brumfield is unsure about any weakness.  She denies any catching, locking, popping.  Ms. Carlie Brumfield denies any bruising, redness, swelling.  She denies any giveway episodes of her lower extremities.  Ms. Carlie Brumfield denies any tripping, stumbling, falling.  She is not using any assistive devices for ambulation.  Ms. Carlie Brumfield denies any numbness, tingling, pins-and-needles.  She denies any saddle " anesthesia, bowel incontinence, bladder incontinence.    Ms. Carlie Brumfield denies that her bilateral lower extremities become weak and tired with standing.  She denies that her bilateral lower extremities become weak and tired with walking.    Ms. Carlie Brumfield has not tried acupuncture, chiropractic treatments, injections, massage, physical therapy for this current episode of back pain.    Ms. Carlie Brumfield states that she really not had low back pain since the lumbosacral radiofrequency ablations/neurotomies of the bilateral L3-L4 and L4-L5 facet joints.    History of Present Illness-  - Carlie Brumfield, 55-year-old female, reports sudden onset of back pain on Sunday (04/06/2025) without any specific incident or movement causing it.  - Pain persists and is not alleviated by laying down, although she can find a tolerable position to sleep.  - Pain exacerbates upon standing and walking, and remains constant throughout the day.  - Visited urgent care on Tuesday due to inability to work comfortably, as she sits at a computer all day.  - Muscle relaxants prescribed at urgent care have been taken at night, but their effectiveness is uncertain.  - Pain is described as sharp and dull, primarily located in the middle of the back, slightly to the right, and occasionally radiating slightly downward.  - Pain worsens with sitting and improves slightly with standing.  - No history of recent injuries or specific incidents leading to the pain.  - Past surgical history includes knee surgery, right foot surgery, appendectomy, and hysterectomy.  - No change in pain with coughing or sneezing.  - No reported weakness, bruising, redness, or swelling associated with the pain.  - No history of osteoporosis, but aware that her mother has it.  - No personal history of cancer.  - Has a history of gastric bypass surgery.        REVIEW OF SYSTEMS:  Review of Systems     Constitutional:  Negative for fever, weight loss, weight gain and  fatigue.   HENT:  Negative for tinnitus, trouble swallowing and hoarse voice.    Eyes:  Negative for eye pain, eye pain and decreased vision.   Respiratory:   Negative for cough, shortness of breath and wheezing.    Cardiovascular:  Positive for leg swelling. Negative for chest pain and palpitations.   Gastrointestinal:  Positive for nausea. Negative for heartburn, vomiting, abdominal pain, diarrhea, constipation, blood in stool and bowel incontinence.   Genitourinary:  Negative for bladder incontinence, dysuria, hematuria and difficulty urinating.   Musculoskeletal:  Positive for myalgias. Negative for arthralgias.   Skin:  Negative for itching, poor wound healing and poor wound healing.   Neurological:  Negative for dizziness, seizures, loss of consciousness, headaches and difficulty walking.   Endo/Heme:  Negative for anemia, swollen glands and bruises/bleeds easily.   Psychiatric/Behavioral:  Negative for depression.          ALLERGIES:  Allergies   Allergen Reactions     No Known Allergies          MEDICATIONS:  Current Outpatient Medications   Medication Sig Dispense Refill     predniSONE (DELTASONE) 20 MG tablet Take 2 tablets (40 mg) by mouth daily for 5 days. 10 tablet 0     cyclobenzaprine (FLEXERIL) 5 MG tablet Take 1 tablet (5 mg) by mouth nightly as needed for muscle spasms. 30 tablet 2     levothyroxine (SYNTHROID/LEVOTHROID) 125 MCG tablet Take 1 tablet (125 mcg) by mouth daily. Skip taking one day per week. 90 tablet 3     oxyCODONE (ROXICODONE) 5 MG tablet Take 1 tablet (5 mg) by mouth every 4 hours as needed for moderate to severe pain. 15 tablet 0         PAST MEDICAL HISTORY:  Past Medical History:   Diagnosis Date     History of blood transfusion     During hysterectomy     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    Hospitalized     Thyroid disease      Unspecified urinary incontinence     Urgency incontinence -- abstracted 7/15/02         PAST SURGICAL HISTORY:  Past Surgical  History:   Procedure Laterality Date     APPENDECTOMY       BIOPSY      Breast     COLONOSCOPY N/A 7/10/2018    Procedure: COLONOSCOPY;  Colonoscopy ([facility])  ;  Surgeon: Lino Lopez MD;  Location: [facility]     DESTRUCTION OF PARAVERTEBRAL FACET LUMBAR / SACRAL SINGLE Right 10/5/2022    Procedure: radiofrequency ablations / neurotomies of the right L3-L4 and L4-L5 facet joints;  Surgeon: Alpesh Leger MD;  Location: [facility] OR     DESTRUCTION OF PARAVERTEBRAL FACET LUMBAR / SACRAL SINGLE Left 10/12/2022    Procedure: radiofrequency ablations / neurotomies of the left L3-L4 and L4-L5 facet joints;  Surgeon: Alpesh Leger MD;  Location: [facility] OR     ESOPHAGOSCOPY, GASTROSCOPY, DUODENOSCOPY (EGD), COMBINED       ESOPHAGOSCOPY, GASTROSCOPY, DUODENOSCOPY (EGD), COMBINED N/A 12/18/2015    Procedure: COMBINED ESOPHAGOSCOPY, GASTROSCOPY, DUODENOSCOPY (EGD), BIOPSY SINGLE OR MULTIPLE;  Surgeon: Lino Lopez MD;  Location: [facility]     GI SURGERY      bariatric surgery     HYSTERECTOMY, PAP NO LONGER INDICATED       ORTHOPEDIC SURGERY       [facility] NONSPECIFIC PROCEDURE      2 c-sections     [facility] NONSPECIFIC PROCEDURE      Appendectomy -- abstracted 7/15/02     [facility] NONSPECIFIC PROCEDURE      Left thumb tendon repair after injury         FAMILY HISTORY:  Family History   Problem Relation Age of Onset     Osteoporosis Mother      Unknown/Adopted Father          SOCIAL HISTORY:  Social History     Socioeconomic History     Marital status:      Spouse name: Not on file     Number of children: Not on file     Years of education: Not on file     Highest education level: Not on file   Occupational History     Not on file   Tobacco Use     Smoking status: Never     Smokeless tobacco: Never   Vaping Use     Vaping status: Never Used   Substance and Sexual Activity     Alcohol use: Yes     Comment: rare     Drug use: No     Sexual activity: Yes     Partners: Male     Birth control/protection: Female Surgical   Other Topics Concern      Parent/sibling w/ CABG, MI or angioplasty before 65F 55M? No   Social History Narrative     Not on file     Social Drivers of Health     Financial Resource Strain: Low Risk  (10/31/2024)    Financial Resource Strain      Within the past 12 months, have you or your family members you live with been unable to get utilities (heat, electricity) when it was really needed?: No   Food Insecurity: Low Risk  (10/31/2024)    Food Insecurity      Within the past 12 months, did you worry that your food would run out before you got money to buy more?: No      Within the past 12 months, did the food you bought just not last and you didn t have money to get more?: No   Transportation Needs: Low Risk  (10/31/2024)    Transportation Needs      Within the past 12 months, has lack of transportation kept you from medical appointments, getting your medicines, non-medical meetings or appointments, work, or from getting things that you need?: No   Physical Activity: Insufficiently Active (10/31/2024)    Exercise Vital Sign      Days of Exercise per Week: 2 days      Minutes of Exercise per Session: 20 min   Stress: Stress Concern Present (10/31/2024)    [employer] - Occupational Stress Questionnaire      Feeling of Stress : Rather much   Social Connections: Unknown (10/31/2024)    Social Connection and Isolation Panel [NHANES]      Frequency of Communication with Friends and Family: Not on file      Frequency of Social Gatherings with Friends and Family: Once a week      Attends Gnosticist Services: Not on file      Active Member of Clubs or Organizations: Not on file      Attends Club or Organization Meetings: Not on file      Marital Status: Not on file   Interpersonal Safety: Low Risk  (10/31/2024)    Interpersonal Safety      Do you feel physically and emotionally safe where you currently live?: Yes      Within the past 12 months, have you been hit, slapped, kicked or otherwise physically hurt by  "someone?: No      Within the past 12 months, have you been humiliated or emotionally abused in other ways by your partner or ex-partner?: No   Housing Stability: Low Risk  (10/31/2024)    Housing Stability      Do you have housing? : Yes      Are you worried about losing your housing?: No         PHYSICAL EXAMINATION:  Vitals:    04/11/25 1255   BP: 130/83   Pulse: 68   SpO2: 100%   Weight: 72.6 kg (160 lb)   Height: 1.676 m (5' 6\")       GENERAL: Mild distress.  Pleasant and cooperative.   PSYCH:  Normal mood and affect.  HEAD:  Normocephalic.  SPEECH:  No dysarthria.  EYES:  No scleral icterus.  Wearing glasses.  EARS:  Hearing is intact to spoken voice.  NOSE:  Midline, symmetric, no rhinorrhea.  LUNGS:  No respiratory distress.  No increased work of breathing.  VASCULAR/PULSES:  Posterior tibial:  Right 2+.  Left 2+.  Dorsalis pedis:  Right 2+.  Left 2+.  LOWER EXTREMITIES: No clubbing, cyanosis, or edema bilaterally.    BALANCE AND GAIT: The patient has a reciprocal gait pattern with bilateral antalgia.  She is able to toe walk, heel walk, tandem walk without difficulty.  Double leg squat is performed with quadriceps dominant pattern.    INSPECTION:  There is no erythema, ecchymosis, deformity, asymmetry, or abnormality of the low back.    LEG LENGTH DISCREPANCY: With standing, right iliac crest appears to be half to 1 cm higher than the left iliac crest.  With lying supine, right medial malleolus extends half cm further than the left medial malleolus.    LUMBOPELVIC PALPATION:  Lumbar Spinous Processes: Moderate tenderness L4, L5, S1.  Lumbar Paraspinals:  Right mild tenderness L5.  Left mild tenderness L5.  Posterior Superior Iliac Spine:  Right moderate tenderness.  Left mild to moderate tenderness.  Superior Cluneal Nerves:  Right not tender.  Left not tender.  Iliac Crest:  Right not tender.  Left not tender.  Sacroiliac Joint:  Right moderate tenderness.  Left mild to moderate tenderness.  Hip External " Rotators:  Right mild to moderate tenderness.  Left mild tenderness.  Ischial Tuberosity:  Right not tender.  Left not tender.  Greater Trochanter:  Right mild to moderate tenderness.  Left mild to moderate tenderness.  Coccyx:  not tender.    THORACOLUMBAR RANGE OF MOTION:  Forward flexion (85 ): Mildly reduced range of motion, increases low back/buttock pain, does not cause radiating pain.  Extension (30 ): Normal range of motion, does not cause pain, does not cause radiating pain.  Lateral bending right (30 ):  Normal range of motion, does not cause pain, does not cause radiating pain.  Lateral bending left (30 ):  Normal range of motion, does not cause pain, does not cause radiating pain.  Twisting right with extension:  Normal range of motion, does not cause pain, does not cause radiating pain.  Twisting left with extension:  Normal range of motion, does not cause pain, does not cause radiating pain.    SACROILIAC RANGE OF MOTION:  Standing flexion:  Right -.  Left -.  Seated flexion:  Right -.  Left -.  One-leg stork (Gillet):  Right -.  Left -.  Sacroiliac joint dysfunction:  Right -.  Left -.    HIP RANGE OF MOTION:  Flexion (110 ):  Right 115 .  Left 115 .  Extension (30 ):  Right 35 .  Left 35 .  External rotation (45 ):  Right 50 .  Left 50 .  Internal rotation (35 ):  Right 40 .  Left 40 .    KNEE RANGE OF MOTION:  Extension (0 ):  Right 0 .  Left 0 .  Flexion (135 ):  Right 140 .  Left 140 .    STRENGTH:  Hip flexion:  Right 5/5.  Left 5/5.  Hip abduction:  Right 5/5.  Left 5/5.  Hip external rotation:  Right 5/5.  Left 5/5.  Hip extension:  Right 5/5.  Left 5/5.  Knee extension:  Right 5/5.  Left 5/5.  Knee flexion:  Right 5/5.  Left 5/5.  Ankle dorsiflexion:  Right 5/5.  Left 5/5.  Great toe dorsiflexion:  Right 5/5.  Left 5/5.  Ankle plantar flexion:  Right 5/5.  Left 5/5.    SENSATION:  Intact to light touch along right L2, L3, L4, L5, S1, S2.  Intact to light touch along left L2, L3, L4, L5, S1,  S2.    REFLEXES:  Patellar (L2-L4):  Right 2+.  Left 2+.  Medial hamstrings (L5-S1):  Right 2+.  Left 2+.  Achilles (S1-S2):  Right 2+.  Left 2+.  Babinski:  Right downgoing.  Left downgoing.  Forced ankle dorsiflexion (clonus):  Right 1 beat.  Left 1 beat.    LUMBOPELVIC SPECIAL TESTS:  Jeane finger:  Right -.  Left -.  Gapping / Distraction:  Right -.  Left -.  Log roll:  Right -.  Left -.  Straight-leg raise (Lasegue):  Right -.  Left -.  Crossed-straight leg raise:  Right -.  Left -.  Resisted straight leg raise:  Right -.  Left -.  FABERE (Catracho):  Right +.  Left -.  FADIR:  Right -.  Left -.  Hip scour:  Right -.  Left -.  Gaenslen:  Right +.  Left -.  Lateral sacral compression:  Right -.  Left -.  Clamshell:  Right -.  Left -.  Femoral nerve stretch:  Right -.  Left -.  Crossed femoral nerve stretch:  Right -.  Left -.  Ely s:  Right -.  Left -.  Yeoman s:  Right +.  Left -.  Midline sacral thrust:  Right +.  Left -.  Malin compression:  Right -.  Left -.          IMAGING:  EXAM: XR LUMBAR SPINE G/E 4 VIEWS  LOCATION: [facility]THOPEDIC [facility]  DATE: 4/11/2025     INDICATION:  Lumbosacral disc herniation, Degeneration of intervertebral disc of lumbosacral region with discogenic back pain, Pain of right sacroiliac joint, Facet arthropathy, lumbosacral  COMPARISON: None.    IMPRESSION:   5 lumbar-type vertebral bodies. Mild levoconvex curvature of the lumbar spine the vertebral bodies of the lumbar spine otherwise have normal stature and alignment without evidence of compression fracture. On extension there is retrolisthesis of L3 on L4 measuring 3 mm. No other evidence of dynamic instability. Soft tissues unremarkable.          ASSESSMENT/PLAN:  Assessment & Plan  Lumbosacral disc herniation:  - Likely acute disc herniation. Pain worsens with sitting and bending forward, improves when lying down. No signs of radiculopathy or significant weakness.  - Order MRI to confirm diagnosis.  Short course of oral steroids prescribed. Consider physical therapy or chiropractic care. Follow-up after steroid course to assess progress.    Degeneration of intervertebral disc of lumbosacral region with discogenic back pain:  - Disc degeneration contributing to back pain. Pain pattern consistent with discogenic pain.  - Order MRI to evaluate disc condition. Consider conservative care including physical therapy and chiropractic treatments.    Pain of right sacroiliac joint:  - Possible sacroiliac joint pain, but less likely than disc herniation.  - Keep sacroiliac joint pain in mind during treatment. Consider physical therapy or chiropractic care.    Facet arthropathy, lumbosacral:  - Facet arthropathy may be contributing to pain.  - Consider conservative care including physical therapy and chiropractic treatments.    Acute bilateral low back pain without sciatica:  - Acute low back pain without sciatica. Pain worsens with sitting and bending forward, improves when lying down.  - Short course of oral steroids prescribed. Consider physical therapy or chiropractic care. Follow-up after steroid course to assess progress.    Ms. Carlie Brumfield is a 55-year-old female.  Her history and exam are are most consistent with an acute disc herniation.  She does also demonstrate some right sacroiliac joint pain.  Ms. Carlie Brumfield has lumbosacral facet arthropathy and lumbosacral degenerative disc disease.  Discussed diagnoses, pathophysiologies, further workup, and treatment options with Ms. Carlie Brumfield.  Discussed the options of doing nothing/living with it, physical therapy, chiropractic care, short course of oral steroids, weight loss, lumbosacral epidural corticosteroid injection, MRI lumbar spine, x-rays of the lumbar spine, referral to neurosurgery.  Ms. Carlie Brumfield is being prescribed prednisone 40 mg daily for 5 days.  She is being sent for x-rays of her lumbar spine today.  She is being referred for an  MRI of her lumbar spine.  Ms. Brumfield is being referred to physical therapy for acute spine care.  Ms. Carlie Brumfield is to let us know how she responds to the short course of oral steroids.  Ms. Carlie Brumfield is to follow-up in this clinic in 1 to 2 months.        Total time on the date of the encounter:  61 minutes were spent on one more or more of the following:  discussion with patient, history, exam, coordinating care, treatment goals, record review, documenting clinical information, and/or data review.    Consent was obtained from the patient to use an AI documentation tool in the creation of this note.      Alpesh Leger MD        Again, thank you for allowing me to participate in the care of your patient.        Sincerely,        Alpesh Leger MD    Electronically signed

## 2025-04-11 NOTE — PROGRESS NOTES
"PHYSICAL MEDICINE & REHABILITATION / MEDICAL SPINE        Date:  Apr 11, 2025    Name:  Carlie Brumfield  YOB: 1970  MRN:  [id number]      \"I am using a new tool to help me save time with documentation.  Basically it is going to listen to our visit today and uses AI to help me draft my note.  Is it okay if I use this tool today?\"        REASON FOR CONSULTATION:  Acute bilateral low back pain without sciatica--acute pain in bilateral SI and lower lumbar spine.        REFERRING PROVIDER:  Grupo Dillon MD        CHART REVIEW:  Reviewed 04/10/2025 note from Grupo Dillon MD (internal medicine).  Ms. Carlie Brumfield began having pain on 04/06/2025 while standing at the sink.  She had an ablation procedure 2 years ago, but her current pain was different.  Pain was constant since it started.  Pain is located in the right low back, adjacent to the sacrum.  Pain exacerbated by sitting and walking.  Oxycodone was prescribed.  Ibuprofen could be used.  Referral to medical spine was placed.        HISTORY OF PRESENT ILLNESS:  Ms. Carlie Brumfield is a 55-year-old female.  She is .  Ms. Brumfield and her  have a son that graduated from [employer] and a daughter that graduated from the [employer].  Ms. Carlie Brumfield works for [employer].  She is currently working from home.    On 10/05/2022, Ms. Brumfield had fluoroscopic-guided medial branch radiofrequency ablations / neurotomies of the right L3-L4 and L4-L5 facet joints.  On 10/12/2022, Ms. Brumfield had fluoroscopic-guided medial branch radiofrequency ablations / neurotomies of the left L3-L4 and L4-L5 facet joints.  On 11/25/2022, Ms. Carlie Brumfield reported 100% improvement in her low back pain.    Ms. Carlie Brumfield states that she had a right knee surgery in the past.  She states she had a right foot surgery in the past.  Ms. Carlie Brumfield denies any other injuries or surgeries of her mid back, low back, pelvis, hips, thighs, " "knees, legs, ankles, feet, toes.    Ms. Carlie Brumfield denies any personal history of cancers.    Ms. Carlie Brumfield denied any personal or family history of autoimmune diseases, rheumatologic disease, gout, pseudogout.  She denied any personal or family history of neurologic diseases.  Ms. Brumfield denied any personal or family history of inflammatory bowel diseases.     Ms. Carlie Brumfield states that on 04/06/2025 she was in the kitchen.  Ms. Carlie Brumfield was not doing anything unusual.  She was standing at the sink.  Her low back seized up.  Ms. Carlie Brumfield has right greater than left low back/buttock pain.  There is no radiation of this pain.  This pain is constant and described as \"sharp.\"  This pain is worsened more by sitting to standing.  Pain improves with lying down.  Pain has not changed since it began.  Pain is currently rated as 7/10.  Pain varies from 3/10 to 9/10.    Ms. Carlie Brumfield is taking muscle relaxers at night but has not noticed any benefit from that.  She has not yet tried oxycodone.  She states she was also found to have urinary tract infections currently on cephalexin.    Ms. Carlie Brumfield notes worsening pain with coughing, sneezing, driving and hitting a pothole.  Her pain does not keep her awake nor wake her.    Ms. Carlie Brumfield is unsure about any weakness.  She denies any catching, locking, popping.  Ms. Carlie Brumfield denies any bruising, redness, swelling.  She denies any giveway episodes of her lower extremities.  Ms. Carlie Brumfield denies any tripping, stumbling, falling.  She is not using any assistive devices for ambulation.  Ms. Carlie Brumfield denies any numbness, tingling, pins-and-needles.  She denies any saddle anesthesia, bowel incontinence, bladder incontinence.    Ms. Carlie Brumfield denies that her bilateral lower extremities become weak and tired with standing.  She denies that her bilateral lower extremities become weak and tired with " walking.    Ms. Carlie Brumfield has not tried acupuncture, chiropractic treatments, injections, massage, physical therapy for this current episode of back pain.    Ms. Carlie Brumfield states that she really not had low back pain since the lumbosacral radiofrequency ablations/neurotomies of the bilateral L3-L4 and L4-L5 facet joints.    History of Present Illness-  - Carlie Brumfield, 55-year-old female, reports sudden onset of back pain on Sunday (04/06/2025) without any specific incident or movement causing it.  - Pain persists and is not alleviated by laying down, although she can find a tolerable position to sleep.  - Pain exacerbates upon standing and walking, and remains constant throughout the day.  - Visited urgent care on Tuesday due to inability to work comfortably, as she sits at a computer all day.  - Muscle relaxants prescribed at urgent care have been taken at night, but their effectiveness is uncertain.  - Pain is described as sharp and dull, primarily located in the middle of the back, slightly to the right, and occasionally radiating slightly downward.  - Pain worsens with sitting and improves slightly with standing.  - No history of recent injuries or specific incidents leading to the pain.  - Past surgical history includes knee surgery, right foot surgery, appendectomy, and hysterectomy.  - No change in pain with coughing or sneezing.  - No reported weakness, bruising, redness, or swelling associated with the pain.  - No history of osteoporosis, but aware that her mother has it.  - No personal history of cancer.  - Has a history of gastric bypass surgery.        REVIEW OF SYSTEMS:  Review of Systems     Constitutional:  Negative for fever, weight loss, weight gain and fatigue.   HENT:  Negative for tinnitus, trouble swallowing and hoarse voice.    Eyes:  Negative for eye pain, eye pain and decreased vision.   Respiratory:   Negative for cough, shortness of breath and wheezing.    Cardiovascular:   Positive for leg swelling. Negative for chest pain and palpitations.   Gastrointestinal:  Positive for nausea. Negative for heartburn, vomiting, abdominal pain, diarrhea, constipation, blood in stool and bowel incontinence.   Genitourinary:  Negative for bladder incontinence, dysuria, hematuria and difficulty urinating.   Musculoskeletal:  Positive for myalgias. Negative for arthralgias.   Skin:  Negative for itching, poor wound healing and poor wound healing.   Neurological:  Negative for dizziness, seizures, loss of consciousness, headaches and difficulty walking.   Endo/Heme:  Negative for anemia, swollen glands and bruises/bleeds easily.   Psychiatric/Behavioral:  Negative for depression.          ALLERGIES:  Allergies   Allergen Reactions    No Known Allergies          MEDICATIONS:  Current Outpatient Medications   Medication Sig Dispense Refill    predniSONE (DELTASONE) 20 MG tablet Take 2 tablets (40 mg) by mouth daily for 5 days. 10 tablet 0    cyclobenzaprine (FLEXERIL) 5 MG tablet Take 1 tablet (5 mg) by mouth nightly as needed for muscle spasms. 30 tablet 2    levothyroxine (SYNTHROID/LEVOTHROID) 125 MCG tablet Take 1 tablet (125 mcg) by mouth daily. Skip taking one day per week. 90 tablet 3    oxyCODONE (ROXICODONE) 5 MG tablet Take 1 tablet (5 mg) by mouth every 4 hours as needed for moderate to severe pain. 15 tablet 0         PAST MEDICAL HISTORY:  Past Medical History:   Diagnosis Date    History of blood transfusion     During hysterectomy    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    Hospitalized    Thyroid disease     Unspecified urinary incontinence     Urgency incontinence -- abstracted 7/15/02         PAST SURGICAL HISTORY:  Past Surgical History:   Procedure Laterality Date    APPENDECTOMY      BIOPSY      Breast    COLONOSCOPY N/A 7/10/2018    Procedure: COLONOSCOPY;  Colonoscopy ([facility])  ;  Surgeon: Lino Lopez MD;  Location: [facility]    DESTRUCTION OF PARAVERTEBRAL FACET  LUMBAR / SACRAL SINGLE Right 10/5/2022    Procedure: radiofrequency ablations / neurotomies of the right L3-L4 and L4-L5 facet joints;  Surgeon: Alpesh Leger MD;  Location: [facility] OR    DESTRUCTION OF PARAVERTEBRAL FACET LUMBAR / SACRAL SINGLE Left 10/12/2022    Procedure: radiofrequency ablations / neurotomies of the left L3-L4 and L4-L5 facet joints;  Surgeon: Alpesh Leger MD;  Location: [facility] OR    ESOPHAGOSCOPY, GASTROSCOPY, DUODENOSCOPY (EGD), COMBINED      ESOPHAGOSCOPY, GASTROSCOPY, DUODENOSCOPY (EGD), COMBINED N/A 12/18/2015    Procedure: COMBINED ESOPHAGOSCOPY, GASTROSCOPY, DUODENOSCOPY (EGD), BIOPSY SINGLE OR MULTIPLE;  Surgeon: Lino Lopez MD;  Location:  GI    GI SURGERY      bariatric surgery    HYSTERECTOMY, PAP NO LONGER INDICATED      ORTHOPEDIC SURGERY      [facility] NONSPECIFIC PROCEDURE      2 c-sections    [facility] NONSPECIFIC PROCEDURE      Appendectomy -- abstracted 7/15/02    [facility] NONSPECIFIC PROCEDURE      Left thumb tendon repair after injury         FAMILY HISTORY:  Family History   Problem Relation Age of Onset    Osteoporosis Mother     Unknown/Adopted Father          SOCIAL HISTORY:  Social History     Socioeconomic History    Marital status:      Spouse name: Not on file    Number of children: Not on file    Years of education: Not on file    Highest education level: Not on file   Occupational History    Not on file   Tobacco Use    Smoking status: Never    Smokeless tobacco: Never   Vaping Use    Vaping status: Never Used   Substance and Sexual Activity    Alcohol use: Yes     Comment: rare    Drug use: No    Sexual activity: Yes     Partners: Male     Birth control/protection: Female Surgical   Other Topics Concern    Parent/sibling w/ CABG, MI or angioplasty before 65F 55M? No   Social History Narrative    Not on file     Social Drivers of Health     Financial Resource Strain: Low Risk  (10/31/2024)    Financial Resource Strain     Within the past 12 months, have you or your  family members you live with been unable to get utilities (heat, electricity) when it was really needed?: No   Food Insecurity: Low Risk  (10/31/2024)    Food Insecurity     Within the past 12 months, did you worry that your food would run out before you got money to buy more?: No     Within the past 12 months, did the food you bought just not last and you didn t have money to get more?: No   Transportation Needs: Low Risk  (10/31/2024)    Transportation Needs     Within the past 12 months, has lack of transportation kept you from medical appointments, getting your medicines, non-medical meetings or appointments, work, or from getting things that you need?: No   Physical Activity: Insufficiently Active (10/31/2024)    Exercise Vital Sign     Days of Exercise per Week: 2 days     Minutes of Exercise per Session: 20 min   Stress: Stress Concern Present (10/31/2024)    [employer] - Occupational Stress Questionnaire     Feeling of Stress : Rather much   Social Connections: Unknown (10/31/2024)    Social Connection and Isolation Panel [NHANES]     Frequency of Communication with Friends and Family: Not on file     Frequency of Social Gatherings with Friends and Family: Once a week     Attends Hindu Services: Not on file     Active Member of Clubs or Organizations: Not on file     Attends Club or Organization Meetings: Not on file     Marital Status: Not on file   Interpersonal Safety: Low Risk  (10/31/2024)    Interpersonal Safety     Do you feel physically and emotionally safe where you currently live?: Yes     Within the past 12 months, have you been hit, slapped, kicked or otherwise physically hurt by someone?: No     Within the past 12 months, have you been humiliated or emotionally abused in other ways by your partner or ex-partner?: No   Housing Stability: Low Risk  (10/31/2024)    Housing Stability     Do you have housing? : Yes     Are you worried about losing your housing?: No  "        PHYSICAL EXAMINATION:  Vitals:    04/11/25 1255   BP: 130/83   Pulse: 68   SpO2: 100%   Weight: 72.6 kg (160 lb)   Height: 1.676 m (5' 6\")       GENERAL: Mild distress.  Pleasant and cooperative.   PSYCH:  Normal mood and affect.  HEAD:  Normocephalic.  SPEECH:  No dysarthria.  EYES:  No scleral icterus.  Wearing glasses.  EARS:  Hearing is intact to spoken voice.  NOSE:  Midline, symmetric, no rhinorrhea.  LUNGS:  No respiratory distress.  No increased work of breathing.  VASCULAR/PULSES:  Posterior tibial:  Right 2+.  Left 2+.  Dorsalis pedis:  Right 2+.  Left 2+.  LOWER EXTREMITIES: No clubbing, cyanosis, or edema bilaterally.    BALANCE AND GAIT: The patient has a reciprocal gait pattern with bilateral antalgia.  She is able to toe walk, heel walk, tandem walk without difficulty.  Double leg squat is performed with quadriceps dominant pattern.    INSPECTION:  There is no erythema, ecchymosis, deformity, asymmetry, or abnormality of the low back.    LEG LENGTH DISCREPANCY: With standing, right iliac crest appears to be half to 1 cm higher than the left iliac crest.  With lying supine, right medial malleolus extends half cm further than the left medial malleolus.    LUMBOPELVIC PALPATION:  Lumbar Spinous Processes: Moderate tenderness L4, L5, S1.  Lumbar Paraspinals:  Right mild tenderness L5.  Left mild tenderness L5.  Posterior Superior Iliac Spine:  Right moderate tenderness.  Left mild to moderate tenderness.  Superior Cluneal Nerves:  Right not tender.  Left not tender.  Iliac Crest:  Right not tender.  Left not tender.  Sacroiliac Joint:  Right moderate tenderness.  Left mild to moderate tenderness.  Hip External Rotators:  Right mild to moderate tenderness.  Left mild tenderness.  Ischial Tuberosity:  Right not tender.  Left not tender.  Greater Trochanter:  Right mild to moderate tenderness.  Left mild to moderate tenderness.  Coccyx:  not tender.    THORACOLUMBAR RANGE OF MOTION:  Forward flexion " (85 ): Mildly reduced range of motion, increases low back/buttock pain, does not cause radiating pain.  Extension (30 ): Normal range of motion, does not cause pain, does not cause radiating pain.  Lateral bending right (30 ):  Normal range of motion, does not cause pain, does not cause radiating pain.  Lateral bending left (30 ):  Normal range of motion, does not cause pain, does not cause radiating pain.  Twisting right with extension:  Normal range of motion, does not cause pain, does not cause radiating pain.  Twisting left with extension:  Normal range of motion, does not cause pain, does not cause radiating pain.    SACROILIAC RANGE OF MOTION:  Standing flexion:  Right -.  Left -.  Seated flexion:  Right -.  Left -.  One-leg stork (Gillet):  Right -.  Left -.  Sacroiliac joint dysfunction:  Right -.  Left -.    HIP RANGE OF MOTION:  Flexion (110 ):  Right 115 .  Left 115 .  Extension (30 ):  Right 35 .  Left 35 .  External rotation (45 ):  Right 50 .  Left 50 .  Internal rotation (35 ):  Right 40 .  Left 40 .    KNEE RANGE OF MOTION:  Extension (0 ):  Right 0 .  Left 0 .  Flexion (135 ):  Right 140 .  Left 140 .    STRENGTH:  Hip flexion:  Right 5/5.  Left 5/5.  Hip abduction:  Right 5/5.  Left 5/5.  Hip external rotation:  Right 5/5.  Left 5/5.  Hip extension:  Right 5/5.  Left 5/5.  Knee extension:  Right 5/5.  Left 5/5.  Knee flexion:  Right 5/5.  Left 5/5.  Ankle dorsiflexion:  Right 5/5.  Left 5/5.  Great toe dorsiflexion:  Right 5/5.  Left 5/5.  Ankle plantar flexion:  Right 5/5.  Left 5/5.    SENSATION:  Intact to light touch along right L2, L3, L4, L5, S1, S2.  Intact to light touch along left L2, L3, L4, L5, S1, S2.    REFLEXES:  Patellar (L2-L4):  Right 2+.  Left 2+.  Medial hamstrings (L5-S1):  Right 2+.  Left 2+.  Achilles (S1-S2):  Right 2+.  Left 2+.  Babinski:  Right downgoing.  Left downgoing.  Forced ankle dorsiflexion (clonus):  Right 1 beat.  Left 1 beat.    LUMBOPELVIC SPECIAL  TESTS:  Jeane finger:  Right -.  Left -.  Gapping / Distraction:  Right -.  Left -.  Log roll:  Right -.  Left -.  Straight-leg raise (Lasegue):  Right -.  Left -.  Crossed-straight leg raise:  Right -.  Left -.  Resisted straight leg raise:  Right -.  Left -.  FABERE (Catracho):  Right +.  Left -.  FADIR:  Right -.  Left -.  Hip scour:  Right -.  Left -.  Gaenslen:  Right +.  Left -.  Lateral sacral compression:  Right -.  Left -.  Clamshell:  Right -.  Left -.  Femoral nerve stretch:  Right -.  Left -.  Crossed femoral nerve stretch:  Right -.  Left -.  Ely s:  Right -.  Left -.  Yeoman s:  Right +.  Left -.  Midline sacral thrust:  Right +.  Left -.  Malin compression:  Right -.  Left -.          IMAGING:  EXAM: XR LUMBAR SPINE G/E 4 VIEWS  LOCATION: [facility] [place]  DATE: 4/11/2025     INDICATION:  Lumbosacral disc herniation, Degeneration of intervertebral disc of lumbosacral region with discogenic back pain, Pain of right sacroiliac joint, Facet arthropathy, lumbosacral  COMPARISON: None.    IMPRESSION:   5 lumbar-type vertebral bodies. Mild levoconvex curvature of the lumbar spine the vertebral bodies of the lumbar spine otherwise have normal stature and alignment without evidence of compression fracture. On extension there is retrolisthesis of L3 on L4 measuring 3 mm. No other evidence of dynamic instability. Soft tissues unremarkable.          ASSESSMENT/PLAN:  Assessment & Plan  Lumbosacral disc herniation:  - Likely acute disc herniation. Pain worsens with sitting and bending forward, improves when lying down. No signs of radiculopathy or significant weakness.  - Order MRI to confirm diagnosis. Short course of oral steroids prescribed. Consider physical therapy or chiropractic care. Follow-up after steroid course to assess progress.    Degeneration of intervertebral disc of lumbosacral region with discogenic back pain:  - Disc degeneration contributing to back pain. Pain  pattern consistent with discogenic pain.  - Order MRI to evaluate disc condition. Consider conservative care including physical therapy and chiropractic treatments.    Pain of right sacroiliac joint:  - Possible sacroiliac joint pain, but less likely than disc herniation.  - Keep sacroiliac joint pain in mind during treatment. Consider physical therapy or chiropractic care.    Facet arthropathy, lumbosacral:  - Facet arthropathy may be contributing to pain.  - Consider conservative care including physical therapy and chiropractic treatments.    Acute bilateral low back pain without sciatica:  - Acute low back pain without sciatica. Pain worsens with sitting and bending forward, improves when lying down.  - Short course of oral steroids prescribed. Consider physical therapy or chiropractic care. Follow-up after steroid course to assess progress.    Ms. Carlie Brumfield is a 55-year-old female.  Her history and exam are are most consistent with an acute disc herniation.  She does also demonstrate some right sacroiliac joint pain.  Ms. Carlie Brumfield has lumbosacral facet arthropathy and lumbosacral degenerative disc disease.  Discussed diagnoses, pathophysiologies, further workup, and treatment options with Ms. Carlie Brumfield.  Discussed the options of doing nothing/living with it, physical therapy, chiropractic care, short course of oral steroids, weight loss, lumbosacral epidural corticosteroid injection, MRI lumbar spine, x-rays of the lumbar spine, referral to neurosurgery.  Ms. Carlie Brumfield is being prescribed prednisone 40 mg daily for 5 days.  She is being sent for x-rays of her lumbar spine today.  She is being referred for an MRI of her lumbar spine.  Ms. Brumfield is being referred to physical therapy for acute spine care.  Ms. Carlie Brumfield is to let us know how she responds to the short course of oral steroids.  Ms. Carlie Brumfield is to follow-up in this clinic in 1 to 2 months.        Total time on  the date of the encounter:  61 minutes were spent on one more or more of the following:  discussion with patient, history, exam, coordinating care, treatment goals, record review, documenting clinical information, and/or data review.    Consent was obtained from the patient to use an AI documentation tool in the creation of this note.      Alpesh Leger MD

## 2025-04-15 ENCOUNTER — MYC MEDICAL ADVICE (OUTPATIENT)
Dept: ORTHOPEDICS | Facility: CLINIC | Age: 55
End: 2025-04-15
Payer: COMMERCIAL

## 2025-04-16 ENCOUNTER — THERAPY VISIT (OUTPATIENT)
Dept: PHYSICAL THERAPY | Facility: CLINIC | Age: 55
End: 2025-04-16
Attending: PHYSICAL MEDICINE & REHABILITATION
Payer: COMMERCIAL

## 2025-04-16 DIAGNOSIS — M51.370 DEGENERATION OF INTERVERTEBRAL DISC OF LUMBOSACRAL REGION WITH DISCOGENIC BACK PAIN: ICD-10-CM

## 2025-04-16 DIAGNOSIS — M47.817 FACET ARTHROPATHY, LUMBOSACRAL: ICD-10-CM

## 2025-04-16 DIAGNOSIS — M51.27 LUMBOSACRAL DISC HERNIATION: ICD-10-CM

## 2025-04-16 DIAGNOSIS — M53.3 PAIN OF RIGHT SACROILIAC JOINT: ICD-10-CM

## 2025-04-16 PROCEDURE — 97112 NEUROMUSCULAR REEDUCATION: CPT | Mod: GP | Performed by: PHYSICAL THERAPIST

## 2025-04-16 PROCEDURE — 97110 THERAPEUTIC EXERCISES: CPT | Mod: GP | Performed by: PHYSICAL THERAPIST

## 2025-04-16 PROCEDURE — 97161 PT EVAL LOW COMPLEX 20 MIN: CPT | Mod: GP | Performed by: PHYSICAL THERAPIST

## 2025-04-16 ASSESSMENT — ENCOUNTER SYMPTOMS
SHORTNESS OF BREATH: 0
BOWEL INCONTINENCE: 0
LOSS OF CONSCIOUSNESS: 0
COUGH: 0
DEPRESSION: 0
VOMITING: 0
MYALGIAS: 1
INSOMNIA: 1
HEADACHES: 0
POOR WOUND HEALING: 0
CONSTIPATION: 0
ARTHRALGIAS: 0
SWOLLEN GLANDS: 0
BLOOD IN STOOL: 0
TROUBLE SWALLOWING: 0
DIZZINESS: 0
DYSURIA: 0
FATIGUE: 0
HEMATURIA: 0
EYE PAIN: 0
FEVER: 0
HEARTBURN: 0
LEG SWELLING: 1
NAUSEA: 1
NERVOUS/ANXIOUS: 0
BRUISES/BLEEDS EASILY: 0
PALPITATIONS: 0
WHEEZING: 0
WEIGHT GAIN: 0
WEIGHT LOSS: 0
DIARRHEA: 0
ABDOMINAL PAIN: 0
SEIZURES: 0
HOARSE VOICE: 0
DIFFICULTY URINATING: 0

## 2025-04-16 NOTE — TELEPHONE ENCOUNTER
You  Carlie Campo now (9:52 AM)     Ms. Carlie SPENCE Octaviano,     Thank you for the update.  Please let me know when you get the MRI lumbar spine completed.     MD Carlie Hernandez  [facility] (supporting You)Yesterday (9:51 AM)       Good morning Dr Leger.      I believe you asked me to message you after I had completed the steroid medication. I took the last dose this morning. I believe it has calmed things down a bit but I haven t been sitting nearly as much as I do for work. I am at the office today and can already feel the pain intensifying. It is sitting at a 4 at the moment. Just a low constant pain. Nothing stabbing.  I have an ultrasound appointment for Monday and will call PT today to try to get in. I just didn t have it in me to go when I was in so much pain.  Do I need to do anything else?     Thanks,  Carlie Brumfield

## 2025-04-16 NOTE — PROGRESS NOTES
PHYSICAL THERAPY EVALUATION  Type of Visit: Evaluation       Fall Risk Screen:  Have you fallen 2 or more times in the past year?: No  Have you fallen and had an injury in the past year?: No  Is patient receiving Physical Therapy Services?: No    Subjective         Presenting condition or subjective complaint: Lower back pain aggravated by sitting  Date of onset: 04/06/25    Relevant medical history: Anemia; Thyroid problems   Dates & types of surgery:      Prior diagnostic imaging/testing results: X-ray     Prior therapy history for the same diagnosis, illness or injury: No      Prior Level of Function  Transfers: Independent  Ambulation: Independent  ADL: Independent      Living Environment  Social support: With a significant other or spouse   Type of home: House   Stairs to enter the home: Yes 3 Is there a railing: No     Ramp: No   Stairs inside the home: Yes 6 Is there a railing: Yes     Help at home:    Equipment owned:       Employment: Yes Surface   Hobbies/Interests:      Patient goals for therapy: Sit and work without pain or being so tight in the back after sitting it hurts to walk    Pain assessment: Pain present  Location: low back/Rating: 3-4/10    Present symptoms: patient reports pain located in the lower back R>L .    Radiation:R buttocks  Type of pain is described as sharp, aching .   Associated symptoms include: intermittently in buttocks R>L, R foot (parasthesia)  Present since: A week with symptoms improving (improving/unchanging/worsening).  This condition is new (new/recurrent/chronic).  Symptoms commenced as a result of: standing at sink and bending   Condition occurred in the following environment: home   Symptoms at onset: low back, radiating into buttocks  Constant symptoms:  Intermittent symptoms:    Cough/Sneeze/Strain:+initially    (with consideration for bending, sitting/rising, standing, walking, lying, am, as the day progresses, pm, when still, on the move, other  )  Symptoms are made worse with the following: sitting, end of day   Symptoms are made better with the following: standing, walking, steroids, am    Sleep disturbance: none  Sleeping postures: sides  Sleeping surface:  (firm, soft, sag)       Objective   LUMBAR:    Posture:   Sitting: fair; Standing:wnl; Lordosis: wnl  Posture Correction: worse  Relevant Lateral Shift: nil    Neurological:    Motor Deficit:  Myotomes L R   L1-2 (hip flexion) 5/5 5/5   L3 (knee extension) 5/5 5/5   L4 (ankle DF) 5/5 5/5   L5 (g. toe ext) 5/5 5/5   S1 (ankle PF or knee flex) 5/5 5/5     Sensory Deficit and Reflexes: intact     Dural Signs:   L R   Slump - -   SLR     Other:     AROM: (Major, Moderate, Minimal or Nil loss)  Movement Loss Kashif Mod Min Nil Pain   Flexion   x  Deviations during return pdm   Extension   x  Incr R buttocks/low back   Side Gliding R    x    Side Gliding L    x      Repeated movement testing:   (During: produces, abolishes, increases, decreases, no effect, centralizing, peripheralizing; After: better, worse, no better, no worse, no effect, centralized, peripheralized)    Pre-test Symptoms Standing:    Symptoms During Symptoms After ROM increased ROM decreased No Effect   FIS        Rep FIS        EIS        Rep EIS        Pre-test Symptoms Lying: R low back/buttocks     Symptoms During Symptoms After ROM increased ROM decreased No Effect   VICENTE D NB      Rep VICENTE D NB Improved flexion     EIL        Rep EIL        If required Pre-test Symptoms:    Symptoms During Symptoms After ROM increased ROM decreased No Effect   SGIS - R        Rep SGIS - R        SGIS - L        Rep SGIS - L          Static Tests:   Other Tests: TA activation is fair to poor; difficulty finding TA in supine; improved in side lying; glut med/max=4/5    Provisional Classification: DP flexion, central R>L LB;  Principle of Management (education/equipment/mechanical therapy/specific principle): rep VICENTE x 10/4 x day; TA and glut med activation        Assessment & Plan   CLINICAL IMPRESSIONS  Medical Diagnosis: Lumbosacral disc herniation  Degeneration of intervertebral disc of lumbosacral region with discogenic back pain  Pain of right sacroiliac joint  Facet arthropathy, lumbosacral    Treatment Diagnosis: low back pain with mobility and strength deficits   Impression/Assessment: Patient is a 55 year old female with low back and R buttocks complaints.  The following significant findings have been identified: Pain, Decreased ROM/flexibility, Decreased strength, Impaired muscle performance, Decreased activity tolerance, and Impaired posture. These impairments interfere with their ability to perform self care tasks, work tasks, recreational activities, household chores, and driving  as compared to previous level of function.     Clinical Decision Making (Complexity):  Clinical Presentation: Stable/Uncomplicated  Clinical Presentation Rationale: based on medical and personal factors listed in PT evaluation  Clinical Decision Making (Complexity): Low complexity    PLAN OF CARE  Treatment Interventions:  Interventions: Manual Therapy, Neuromuscular Re-education, Therapeutic Activity, Therapeutic Exercise    Long Term Goals     PT Goal 1  Goal Identifier: sitting  Goal Description: patient will sit in neutral posture (or with lumbar roll as needed) for 2+ hours painfree  Rationale: to maximize safety and independence within the community;to maximize safety and independence with transportation;to maximize safety and independence with self cares  Target Date: 06/11/25  PT Goal 2  Goal Identifier: bending  Goal Description: patient will be able to bend to ankles or perform repetitive bending painfree  Rationale: to maximize safety and independence with performance of ADLs and functional tasks;to maximize safety and independence within the home;to maximize safety and independence with self cares;to maximize safety and independence with transportation;to maximize  safety and independence within the community  Target Date: 06/11/25      Frequency of Treatment: 1 x week  Duration of Treatment: 8 weeks    Recommended Referrals to Other Professionals:   Education Assessment:   Learner/Method: Patient;No Barriers to Learning;Pictures/Video    Risks and benefits of evaluation/treatment have been explained.   Patient/Family/caregiver agrees with Plan of Care.     Evaluation Time:     PT Eval, Low Complexity Minutes (69128): 20       Signing Clinician: Stephanie Cleveland PT

## 2025-04-21 ENCOUNTER — HOSPITAL ENCOUNTER (OUTPATIENT)
Dept: MRI IMAGING | Facility: CLINIC | Age: 55
Discharge: HOME | End: 2025-04-21
Attending: PHYSICAL MEDICINE & REHABILITATION | Admitting: PHYSICAL MEDICINE & REHABILITATION
Payer: COMMERCIAL

## 2025-04-21 DIAGNOSIS — M51.370 DEGENERATION OF INTERVERTEBRAL DISC OF LUMBOSACRAL REGION WITH DISCOGENIC BACK PAIN: ICD-10-CM

## 2025-04-21 DIAGNOSIS — M51.27 LUMBOSACRAL DISC HERNIATION: ICD-10-CM

## 2025-04-21 DIAGNOSIS — M47.817 FACET ARTHROPATHY, LUMBOSACRAL: ICD-10-CM

## 2025-04-21 DIAGNOSIS — M53.3 PAIN OF RIGHT SACROILIAC JOINT: ICD-10-CM

## 2025-04-21 PROCEDURE — 72148 MRI LUMBAR SPINE W/O DYE: CPT

## 2025-04-29 ENCOUNTER — THERAPY VISIT (OUTPATIENT)
Dept: PHYSICAL THERAPY | Facility: CLINIC | Age: 55
End: 2025-04-29
Payer: COMMERCIAL

## 2025-04-29 DIAGNOSIS — M51.370 DEGENERATION OF INTERVERTEBRAL DISC OF LUMBOSACRAL REGION WITH DISCOGENIC BACK PAIN: Primary | ICD-10-CM

## 2025-04-29 PROCEDURE — 97110 THERAPEUTIC EXERCISES: CPT | Mod: GP | Performed by: PHYSICAL THERAPIST

## 2025-05-01 ENCOUNTER — TELEPHONE (OUTPATIENT)
Dept: PALLIATIVE MEDICINE | Facility: CLINIC | Age: 55
End: 2025-05-01
Payer: COMMERCIAL

## 2025-05-01 NOTE — TELEPHONE ENCOUNTER
"Screening Questions for Radiology Injections:    Injection to be done at which interventional clinic site? [facility]    If choosing [facility] for location, please inform patient:  \"[facility] is a Hospital based clinic. Before your visit, you should check with your insurance about how it covers the charges for facility services in a hospital-based clinic.     Procedure ordered by Dr. Leger    Procedure ordered? L4-5 Interlaminar ERIK  Transforaminal Cervical ERIK - [place] [facility] [place] - No [facility] Site providers perform this procedure    What insurance would patient like us to bill for this procedure? UMR  IF SCHEDULING IN [place] PAIN OR SPINE PLEASE SCHEDULE AT LEAST 7-10 BUSINESS DAYS OUT SO A PA CAN BE OBTAINED  Worker's comp or MVA (motor vehicle accident) -Any injection DO NOT SCHEDULE and route to Sera Sinha.    [employer]) insurance - For ALL INJECTIONS DO NOT SCHEDULE and route to Yoko Parnell.     ALL BCBS, [employer] and [employer] - DO NOT SCHEDULE and route to Yoko Parnell  MEDICA- ALL INJECTIONS- route to Yoko Parnell    Is patient scheduled at [facility]? NO   If YES, route every encounter to [facility] CARE NAVIGATION POOL [[phone]]    Is an  needed? No     Patient has a  home? (Review Grid) YES: OK    Any chance of pregnancy? NO   If YES, do NOT schedule and route to RN pool  - Dr. Adames route to PM&R Nurse  [[id number]]      Is patient actively being treated for cancer or immunocompromised? No  If YES, do NOT schedule and route to RN pool/ Dr. Adames's Team    Does the patient have a bleeding or clotting disorder? No   If YES, okay to schedule AND route to RN nurse / Dr. Adames's Team   (For any patients with platelet count <100, RN must forward to provider)    Is patient taking any Blood Thinners OR Antiplatelet medication?  No   If hold needed, do NOT schedule, route to RN pool/ Dr. Adames's Team  Examples:   Blood Thinners: " (Coumadin, Warfarin, Jantoven, Pradaxa, Xarelto, Eliquis, Edoxaban, Enoxaparin, Lovenox, Heparin, Arixtra, Fondaparinux or Fragmin)  Antiplatelet Medications: (Plavix, Brilinta or Effient)     Is patient taking any aspirin products (includes Excedrin and Fiorinal)? No.    If yes route to RN pool/ Dr. Adames's Team - Do not schedule    Is patient taking any GLP-1 Antagonist (hold needed for sedation patients only) No   (semaglutide (Ozempic, Wegovy), dulaglutide (Trulicity), exenatide ER (Bydureon), tirzepatide (Mounjaro), Liraglutide (Saxenda, Victoza), semaglutide (Rybelsus), Terzepatide (Zepbound)  If YES, okay to schedule AND route to RN  / Dr. Adames's Team      Any allergies to contrast dye, iodine, shellfish, or numbing and steroid medications? No  If YES, schedule and add allergy information to appointment notes AND route to the RN pool/ Dr. Adames's Team  If ERIK and Contrast Dye / Iodine Allergy? DO NOT SCHEDULE, route to RN pool/ Dr. Adames's Team  Allergies: No known allergies     Does patient have an active infection or treated for one within the past week? No  Is patient currently taking any antibiotics or steroid medications?  No   For patients on chronic, preventative, or prophylactic antibiotics, procedures may be scheduled.   For patients on antibiotics for active or recent infection, schedule 4 days after completed.  For patients on steroid medications, schedule 4 days after completed.     Has the patient had a flu shot or any other vaccinations within the past 7 days? No  If yes, explain that for the vaccine to work best they need to:     wait 1 week before and 1 week after getting any Vaccine  wait 1 week before and 2 weeks after getting any Covid Vaccine   If patient has concerns about the timing, send to RN pool/ Dr. Adames's Team    Does patient have an MRI/CT?  YES: 4/21/25 Include Date and Check Procedure Scheduling Grid to see if required.  Was the MRI/CT done within the last 3 years?   Yes   If no route to RN Pool/ Dr. Adames's Team  If yes, where was the MRI/CT done? [facility]   Refer to PACS Transmissions list for approved external locations and route to RN Pool High Priority/ Dr. Mulligans Team  If MRI was not done at approved external location do NOT schedule and route to RN pool/ Dr. Mulligans Team    If patient has an imaging disc, the injection MAY be scheduled but patient must bring disc to appt or appt will be cancelled.    Is patient able to transfer to a procedure table with minimal or no assistance? Yes   If no, do NOT schedule and route to RN Pool/ Dr. Adames's Team    Procedure Specific Instructions:  If celiac plexus block, informed patient NPO for 6 hours and that it is okay to take medications with sips of water, especially blood pressure medications Not Applicable       If this is for a cervical procedure, informed patient that aspirin needs to be held for 6 days.   Not Applicable    Sedation, If Sedation is ordered for any procedure, patient must be NPO for 6 hours prior to procedure Not Applicable    If IV needed:  Do not schedule procedures requiring IV placement in the first appointment of the day or first appointment after lunch. Do NOT schedule at 0745, 0815 or 1245.     Instructed patient to arrive 30 minutes early for IV start if required. (Check Procedure Scheduling Grid)  Not Applicable    Reminders:  If you are started on any steroids or antibiotics between now and your appointment, you must contact us because the procedure may need to be cancelled.  Yes    As a reminder, receiving steroids can decrease your body's ability to fight infection.   Would you still like to move forward with scheduling the injection?  Yes    IV Sedation is not provided for procedures. If oral anti-anxiety medication is needed, the patient should request this from their referring provider.    Instruct patient to arrive as directed prior to the scheduled appointment time:  If IV needed 30 minutes  before appointment time     For patients 85 or older we recommend having an adult stay w/ them for the remainder of the day.     If the patient is Diabetic, remind them to bring their glucometer.    Dr. Burgos Pt's - Imaging Orders Needed   Please send all injections to RN Pool Not Applicable   Red Flags? Not Applicable    Does the patient have any questions?  NO  Olamide Raymond  [facility]

## 2025-05-06 ENCOUNTER — OFFICE VISIT (OUTPATIENT)
Dept: ORTHOPEDICS | Facility: CLINIC | Age: 55
End: 2025-05-06
Payer: COMMERCIAL

## 2025-05-06 VITALS — HEART RATE: 51 BPM | SYSTOLIC BLOOD PRESSURE: 143 MMHG | OXYGEN SATURATION: 100 % | DIASTOLIC BLOOD PRESSURE: 85 MMHG

## 2025-05-06 DIAGNOSIS — N28.1 RENAL CYST, LEFT: ICD-10-CM

## 2025-05-06 DIAGNOSIS — M47.817 FACET ARTHROPATHY, LUMBOSACRAL: ICD-10-CM

## 2025-05-06 DIAGNOSIS — M51.370 DEGENERATION OF INTERVERTEBRAL DISC OF LUMBOSACRAL REGION WITH DISCOGENIC BACK PAIN: ICD-10-CM

## 2025-05-06 RX ORDER — NAPROXEN 500 MG/1
500 TABLET ORAL 2 TIMES DAILY WITH MEALS
Qty: 28 TABLET | Refills: 0 | Status: SHIPPED | OUTPATIENT
Start: 2025-05-06 | End: 2025-05-20

## 2025-05-06 ASSESSMENT — PAIN SCALES - GENERAL: PAINLEVEL_OUTOF10: MODERATE PAIN (4)

## 2025-05-06 NOTE — LETTER
"5/6/2025      Carlie Brumfield  [address] [facility] [address]      Dear Colleague,    Thank you for referring your patient, Carlie Brumfield, to the [facility]. Please see a copy of my visit note below.    PHYSICAL MEDICINE & REHABILITATION / MEDICAL SPINE        Date:  May 6, 2025    Name:  Carlie Brumfield  YOB: 1970  MRN:  [id number]      \"I am using a new tool to help me save time with documentation.  Basically it is going to listen to our visit today and uses AI to help me draft my note.  Is it okay if I use this tool today?\"        CHART REVIEW:  Reviewed 04/10/2025 note from Grupo Dillon MD (internal medicine).  Ms. Carlie Brumfield began having pain on 04/06/2025 while standing at the sink.  She had an ablation procedure 2 years ago, but her current pain was different.  Pain was constant since it started.  Pain was located in the right low back, adjacent to the sacrum.  Pain exacerbated by sitting and walking.  Oxycodone was prescribed.  Ibuprofen could be used.  Referral to medical spine was placed.         CHIEF COMPLAINT:  Low back pain.        HISTORY OF PRESENT ILLNESS:  Ms. Carlie Brumfield is a 55-year-old female.  She is .  Ms. Brumfield and her  have a son that graduated from [employer] and a daughter that graduated from the [employer].  Ms. Carlie Brumfield works for [employer].  She is currently working from home.     On 10/05/2022, Ms. Brumfield had fluoroscopic-guided medial branch radiofrequency ablations / neurotomies of the right L3-L4 and L4-L5 facet joints.  On 10/12/2022, Ms. Brumfield had fluoroscopic-guided medial branch radiofrequency ablations / neurotomies of the left L3-L4 and L4-L5 facet joints.  On 11/25/2022, Ms. Carlie Brumfield reported 100% improvement in her low back pain.     Ms. Carlie Brumfield stated that she had a right knee surgery in the past.  She stated she had a right foot surgery in the past. "  Ms. Carlie Brumfield denied any other injuries or surgeries of her mid back, low back, pelvis, hips, thighs, knees, legs, ankles, feet, toes.     Ms. Carlie Brumfield denied any personal history of cancers.     Ms. Carlie Brumfield denied any personal or family history of autoimmune diseases, rheumatologic disease, gout, pseudogout.  She denied any personal or family history of neurologic diseases.  Ms. Brumfield denied any personal or family history of inflammatory bowel diseases.     Ms. Carlie Brumfield stated that on 04/06/2025 she was in the kitchen.  Ms. Carlie Brumfield was not doing anything unusual.  She was standing at the sink.  Her low back seized up.  Ms. Carlie Brumfield developed right greater than left low back/buttock pain.  There is no radiation of this pain.  This pain is worsened more by sitting to standing.  Pain improves with lying down.    History of Present Illness-  - Carlie Brumfield, 55-year-old female, reports ongoing issues with low back pain.  - Has been taking gabapentin, one pill in the morning and one at bedtime, which has helped but not completely alleviated the pain.  - Experienced high blood pressure, unsure if related to gabapentin or pain.  - Reports feeling more sleepy.  - No appetite and struggling with daily activities.  - Has been working from home due to pain.  - MRI results show degenerative disc disease and a disc bulge, with pain sometimes worse on the right side.  - Reports constant urinary tract infections without noticeable symptoms.  - Has multiple cystic lesions within the left kidney, previously noted as a single cyst in past MRI.  - Reports pain in the middle back and neck, possibly due to compensating for low back pain.  - Has tried muscle relaxers in the past, which helped slightly.  - Reports that pain is not bad at night when lying down.  - Has been experiencing pain for a while, with some relief from oral steroids.  - Reports that the pain has improved with  medication but still persists.  - Physical therapy has been beneficial.        ALLERGIES:  Allergies   Allergen Reactions     No Known Allergies          MEDICATIONS:  Current Outpatient Medications   Medication Sig Dispense Refill     acetaminophen 500 MG CAPS Take 1,000 mg by mouth 3 times daily. 180 capsule 0     naproxen (NAPROSYN) 500 MG tablet Take 1 tablet (500 mg) by mouth 2 times daily (with meals) for 14 days. 28 tablet 0     gabapentin (NEURONTIN) 300 MG capsule Take 2 capsules (600 mg) by mouth 3 times daily. 360 capsule 0     levothyroxine (SYNTHROID/LEVOTHROID) 125 MCG tablet Take 1 tablet (125 mcg) by mouth daily. Skip taking one day per week. 90 tablet 3     oxyCODONE (ROXICODONE) 5 MG tablet Take 1 tablet (5 mg) by mouth every 4 hours as needed for moderate to severe pain. 15 tablet 0         PAST MEDICAL HISTORY:  Past Medical History:   Diagnosis Date     History of blood transfusion     During hysterectomy     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    Hospitalized     Thyroid disease      Unspecified urinary incontinence     Urgency incontinence -- abstracted 7/15/02         PAST SURGICAL HISTORY:  Past Surgical History:   Procedure Laterality Date     APPENDECTOMY       BIOPSY      Breast     COLONOSCOPY N/A 7/10/2018    Procedure: COLONOSCOPY;  Colonoscopy ([facility])  ;  Surgeon: Lino Lopez MD;  Location: [facility]     DESTRUCTION OF PARAVERTEBRAL FACET LUMBAR / SACRAL SINGLE Right 10/5/2022    Procedure: radiofrequency ablations / neurotomies of the right L3-L4 and L4-L5 facet joints;  Surgeon: Alpesh Leger MD;  Location: [facility] OR     DESTRUCTION OF PARAVERTEBRAL FACET LUMBAR / SACRAL SINGLE Left 10/12/2022    Procedure: radiofrequency ablations / neurotomies of the left L3-L4 and L4-L5 facet joints;  Surgeon: Alpesh Leger MD;  Location: [facility] OR     ESOPHAGOSCOPY, GASTROSCOPY, DUODENOSCOPY (EGD), COMBINED       ESOPHAGOSCOPY, GASTROSCOPY, DUODENOSCOPY (EGD), COMBINED N/A  12/18/2015    Procedure: COMBINED ESOPHAGOSCOPY, GASTROSCOPY, DUODENOSCOPY (EGD), BIOPSY SINGLE OR MULTIPLE;  Surgeon: Lino Lopez MD;  Location:  GI     GI SURGERY      bariatric surgery     HYSTERECTOMY, PAP NO LONGER INDICATED       ORTHOPEDIC SURGERY       [facility] NONSPECIFIC PROCEDURE      2 c-sections     [facility] NONSPECIFIC PROCEDURE      Appendectomy -- abstracted 7/15/02     [facility] NONSPECIFIC PROCEDURE      Left thumb tendon repair after injury         FAMILY HISTORY:  Family History   Problem Relation Age of Onset     Osteoporosis Mother      Unknown/Adopted Father          SOCIAL HISTORY:  Social History     Socioeconomic History     Marital status:      Spouse name: Not on file     Number of children: Not on file     Years of education: Not on file     Highest education level: Not on file   Occupational History     Not on file   Tobacco Use     Smoking status: Never     Smokeless tobacco: Never   Vaping Use     Vaping status: Never Used   Substance and Sexual Activity     Alcohol use: Yes     Comment: rare     Drug use: No     Sexual activity: Yes     Partners: Male     Birth control/protection: Female Surgical   Other Topics Concern     Parent/sibling w/ CABG, MI or angioplasty before 65F 55M? No   Social History Narrative     Not on file     Social Drivers of Health     Financial Resource Strain: Low Risk  (10/31/2024)    Financial Resource Strain      Within the past 12 months, have you or your family members you live with been unable to get utilities (heat, electricity) when it was really needed?: No   Food Insecurity: Low Risk  (10/31/2024)    Food Insecurity      Within the past 12 months, did you worry that your food would run out before you got money to buy more?: No      Within the past 12 months, did the food you bought just not last and you didn t have money to get more?: No   Transportation Needs: Low Risk  (10/31/2024)    Transportation Needs      Within the past 12 months, has lack of  transportation kept you from medical appointments, getting your medicines, non-medical meetings or appointments, work, or from getting things that you need?: No   Physical Activity: Insufficiently Active (10/31/2024)    Exercise Vital Sign      Days of Exercise per Week: 2 days      Minutes of Exercise per Session: 20 min   Stress: Stress Concern Present (10/31/2024)    [employer] [place] [employer] - Occupational Stress Questionnaire      Feeling of Stress : Rather much   Social Connections: Unknown (10/31/2024)    Social Connection and Isolation Panel [NHANES]      Frequency of Communication with Friends and Family: Not on file      Frequency of Social Gatherings with Friends and Family: Once a week      Attends [employer] Services: Not on file      Active Member of Clubs or Organizations: Not on file      Attends Club or Organization Meetings: Not on file      Marital Status: Not on file   Interpersonal Safety: Low Risk  (10/31/2024)    Interpersonal Safety      Do you feel physically and emotionally safe where you currently live?: Yes      Within the past 12 months, have you been hit, slapped, kicked or otherwise physically hurt by someone?: No      Within the past 12 months, have you been humiliated or emotionally abused in other ways by your partner or ex-partner?: No   Housing Stability: Low Risk  (10/31/2024)    Housing Stability      Do you have housing? : Yes      Are you worried about losing your housing?: No         PHYSICAL EXAMINATION:  Vitals:    05/06/25 1332   BP: (!) 143/85   Pulse: 51   SpO2: 100%       GENERAL: Mild discomfort.  Pleasant and cooperative.   PSYCH:  Normal mood and affect.  HEAD:  Normocephalic.  SPEECH:  No dysarthria.  EYES:  No scleral icterus.  Wearing glasses.  EARS:  Hearing is intact to spoken voice.  NOSE:  Midline, symmetric, no rhinorrhea.  LUNGS:  No respiratory distress.  No increased work of breathing.        IMAGING:  EXAM: MR LUMBAR SPINE W/O  CONTRAST  LOCATION: [facility]  DATE: 4/21/2025     INDICATION:  Lumbosacral disc herniation, Degeneration of intervertebral disc of lumbosacral region with discogenic back pain, Pain of right sacroiliac joint, Facet arthropathy, lumbosacral  COMPARISON: 04/11/2025, 07/02/2022  TECHNIQUE: Routine Lumbar Spine MRI without IV contrast.     FINDINGS:      Nomenclature is based on 5 lumbar type vertebral bodies.      Normal vertebral body heights. There is a very subtle posterior listhesis of L2 on L3. T2 and T1 hyperintense foci in the L1 and L3 vertebral bodies are presumable meningiomas. No abnormal signal within the lumbar spine vertebral bodies. Normal distal spinal cord and cauda equina with conus medullaris at L1/L2. The paraspinal musculature is unremarkable. No acute findings in the partially visualized abdomen or pelvis. There are multiple cystic lesions within the left kidney that are non-diagnostically evaluated on the current exam.     T12-L1: Mild disc height loss. No herniation. Normal facets. No spinal canal or neural foraminal stenosis.      L1-L2: Disc height is preserved. No alvin herniation. There is a small posterior disc bulge along with mild bilateral facet arthropathy contributing to a mild bilateral neuroforaminal stenosis but no spinal canal stenosis.     L2-L3: Disc height is preserved. Fairly symmetrical posterior disc bulge along with right greater than left mild bilateral facet arthropathy which contribute to a mild canal stenosis and mild bilateral neuroforaminal stenosis.     L3-L4: Disc height is preserved. There is a fairly symmetrical small posterior disc bulge along with bilateral facet arthropathy contribute into a mild spinal canal stenosis and mild to moderate right and mild left neuroforaminal stenosis.     L4-L5: Disc height is preserved. There is a small fairly symmetrical posterior disc bulge along with bilateral facet arthropathy, greater on the left  with ligamentum flavum hypertrophy contributing to a mild spinal canal stenosis along with a moderate right and mild to moderate left neuroforaminal stenosis.      L5-S1: Disc height is preserved. No alvin herniation. Mild degree of bilateral facet arthropathy however no spinal canal or neuroforaminal stenosis is appreciated.    IMPRESSION:     Multilevel degenerative changes in the lumbar spine most pronounced at L4-L5 with a mild to moderate right neuroforaminal stenosis. There is no high-grade spinal canal or neuroforaminal stenosis visualized.        EXAM: XR LUMBAR SPINE G/E 4 VIEWS  LOCATION: [facility]  DATE: 4/11/2025     INDICATION:  Lumbosacral disc herniation, Degeneration of intervertebral disc of lumbosacral region with discogenic back pain, Pain of right sacroiliac joint, Facet arthropathy, lumbosacral  COMPARISON: None.     IMPRESSION:   5 lumbar-type vertebral bodies. Mild levoconvex curvature of the lumbar spine the vertebral bodies of the lumbar spine otherwise have normal stature and alignment without evidence of compression fracture. On extension there is retrolisthesis of L3 on L4 measuring 3 mm. No other evidence of dynamic instability. Soft tissues unremarkable.          ASSESSMENT/PLAN:  Ms. Carlie Brumfield is a 55-year-old female.      Assessment & Plan  Renal cyst, left:  - Multiple cystic lesions within the left kidney noted, non-diagnostically evaluated on the MRI lumbar spine. No acute findings.  - Referral to a nephrologist for further evaluation.  - The patient also requests referral to urology for recurrent urinary tract infections.    Degeneration of intervertebral disc of lumbosacral region with discogenic back pain:  - Degenerative disc disease with disc bulge noted. Pain likely due to discogenic issues, exacerbated by sitting. No vertebrogenic signs of low back pain.  - Increase gabapentin dosage if no side effects are noted. Prescribe naproxen  and acetaminophen for pain management. Consider muscle relaxer at bedtime to aid sleep. Physical therapy, chiropractic care, weight loss, and core strengthening recommended. Light duty work restrictions: 8-hour workday, lifting no greater than 10 lbs, 5 to 10-minute break every 1 to 2 hours.  - Risks and side effects: Risk of seizures if gabapentin is stopped abruptly.  - Ms. Carlie Brumfield is scheduled for an L4-L5 interlaminar epidural corticosteroid injection on 05/29/2025.    Ms. Carlie Brumfield is being prescribed naproxen 500 mg p.o. twice daily, dispense 14-day supply, and acetaminophen 1000 mg p.o. 3 times daily, dispense 30-day supply.      Total time on the date of the encounter:  46 minutes were spent on one more or more of the following:  discussion with patient, history, exam, coordinating care, treatment goals, record review, documenting clinical information, and/or data review.    Consent was obtained from the patient to use an AI documentation tool in the creation of this note.      Alpesh Leger MD        Again, thank you for allowing me to participate in the care of your patient.        Sincerely,        Alpesh Leger MD    Electronically signed

## 2025-05-06 NOTE — PATIENT INSTRUCTIONS
Please schedule a follow-up appointment in 2 months.  You can schedule this at the , or you can call [phone].    Clinic Fax:  [phone].    For nursing questions, please call [phone].    For medical records, please call [phone].

## 2025-05-06 NOTE — LETTER
May 6, 2025      Carlie Brumfield  [address] [address] [address]        To Whom It May Concern:    Ms. Carlie Brumfield was seen in our clinic.  She may return to work with the following:  limited to 8 hour workday, limited to light duty - lifting no greater than 10 pounds, no bending/stooping, no climbing, and no repetitive motions, and must be able to take a break for 5-10 mins every 1-2 hrs to move position on or about 07/01/2025.    Sincerely,      Alpesh Leger      Electronically signed

## 2025-05-06 NOTE — PROGRESS NOTES
"PHYSICAL MEDICINE & REHABILITATION / MEDICAL SPINE        Date:  May 6, 2025    Name:  Carlie Brumfield  YOB: 1970  MRN:  [id number]      \"I am using a new tool to help me save time with documentation.  Basically it is going to listen to our visit today and uses AI to help me draft my note.  Is it okay if I use this tool today?\"        CHART REVIEW:  Reviewed 04/10/2025 note from Grupo Dillon MD (internal medicine).  Ms. Carlie Brumfield began having pain on 04/06/2025 while standing at the sink.  She had an ablation procedure 2 years ago, but her current pain was different.  Pain was constant since it started.  Pain was located in the right low back, adjacent to the sacrum.  Pain exacerbated by sitting and walking.  Oxycodone was prescribed.  Ibuprofen could be used.  Referral to medical spine was placed.         CHIEF COMPLAINT:  Low back pain.        HISTORY OF PRESENT ILLNESS:  Ms. Carlie Brumfield is a 55-year-old female.  She is .  Ms. Brumfield and her  have a son that graduated from [employer] and a daughter that graduated from the [employer].  Ms. Carlie Brumfield works for [employer].  She is currently working from home.     On 10/05/2022, Ms. Brumfield had fluoroscopic-guided medial branch radiofrequency ablations / neurotomies of the right L3-L4 and L4-L5 facet joints.  On 10/12/2022, Ms. Brumfield had fluoroscopic-guided medial branch radiofrequency ablations / neurotomies of the left L3-L4 and L4-L5 facet joints.  On 11/25/2022, Ms. Carlie Brumfield reported 100% improvement in her low back pain.     Ms. Carlie Brumfield stated that she had a right knee surgery in the past.  She stated she had a right foot surgery in the past.  Ms. Carlie Brumfield denied any other injuries or surgeries of her mid back, low back, pelvis, hips, thighs, knees, legs, ankles, feet, toes.     Ms. Carlie Brumfield denied any personal history of cancers.     Ms. Carlie Brumfield denied any " personal or family history of autoimmune diseases, rheumatologic disease, gout, pseudogout.  She denied any personal or family history of neurologic diseases.  Ms. Brumfield denied any personal or family history of inflammatory bowel diseases.     Ms. Carlie Brumfield stated that on 04/06/2025 she was in the kitchen.  Ms. Carlie Brumfield was not doing anything unusual.  She was standing at the sink.  Her low back seized up.  Ms. Carlie Brumfield developed right greater than left low back/buttock pain.  There is no radiation of this pain.  This pain is worsened more by sitting to standing.  Pain improves with lying down.    History of Present Illness-  - Carlie Brumfield, 55-year-old female, reports ongoing issues with low back pain.  - Has been taking gabapentin, one pill in the morning and one at bedtime, which has helped but not completely alleviated the pain.  - Experienced high blood pressure, unsure if related to gabapentin or pain.  - Reports feeling more sleepy.  - No appetite and struggling with daily activities.  - Has been working from home due to pain.  - MRI results show degenerative disc disease and a disc bulge, with pain sometimes worse on the right side.  - Reports constant urinary tract infections without noticeable symptoms.  - Has multiple cystic lesions within the left kidney, previously noted as a single cyst in past MRI.  - Reports pain in the middle back and neck, possibly due to compensating for low back pain.  - Has tried muscle relaxers in the past, which helped slightly.  - Reports that pain is not bad at night when lying down.  - Has been experiencing pain for a while, with some relief from oral steroids.  - Reports that the pain has improved with medication but still persists.  - Physical therapy has been beneficial.        ALLERGIES:  Allergies   Allergen Reactions    No Known Allergies          MEDICATIONS:  Current Outpatient Medications   Medication Sig Dispense Refill    acetaminophen  500 MG CAPS Take 1,000 mg by mouth 3 times daily. 180 capsule 0    naproxen (NAPROSYN) 500 MG tablet Take 1 tablet (500 mg) by mouth 2 times daily (with meals) for 14 days. 28 tablet 0    gabapentin (NEURONTIN) 300 MG capsule Take 2 capsules (600 mg) by mouth 3 times daily. 360 capsule 0    levothyroxine (SYNTHROID/LEVOTHROID) 125 MCG tablet Take 1 tablet (125 mcg) by mouth daily. Skip taking one day per week. 90 tablet 3    oxyCODONE (ROXICODONE) 5 MG tablet Take 1 tablet (5 mg) by mouth every 4 hours as needed for moderate to severe pain. 15 tablet 0         PAST MEDICAL HISTORY:  Past Medical History:   Diagnosis Date    History of blood transfusion     During hysterectomy    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    Hospitalized    Thyroid disease     Unspecified urinary incontinence     Urgency incontinence -- abstracted 7/15/02         PAST SURGICAL HISTORY:  Past Surgical History:   Procedure Laterality Date    APPENDECTOMY      BIOPSY      Breast    COLONOSCOPY N/A 7/10/2018    Procedure: COLONOSCOPY;  Colonoscopy ([facility])  ;  Surgeon: Lino Lopez MD;  Location:  GI    DESTRUCTION OF PARAVERTEBRAL FACET LUMBAR / SACRAL SINGLE Right 10/5/2022    Procedure: radiofrequency ablations / neurotomies of the right L3-L4 and L4-L5 facet joints;  Surgeon: Alpesh Leger MD;  Location: [facility] OR    DESTRUCTION OF PARAVERTEBRAL FACET LUMBAR / SACRAL SINGLE Left 10/12/2022    Procedure: radiofrequency ablations / neurotomies of the left L3-L4 and L4-L5 facet joints;  Surgeon: Alpesh Leger MD;  Location: [facility] OR    ESOPHAGOSCOPY, GASTROSCOPY, DUODENOSCOPY (EGD), COMBINED      ESOPHAGOSCOPY, GASTROSCOPY, DUODENOSCOPY (EGD), COMBINED N/A 12/18/2015    Procedure: COMBINED ESOPHAGOSCOPY, GASTROSCOPY, DUODENOSCOPY (EGD), BIOPSY SINGLE OR MULTIPLE;  Surgeon: Lino Lopez MD;  Location:  GI    GI SURGERY      bariatric surgery    HYSTERECTOMY, PAP NO LONGER INDICATED      ORTHOPEDIC SURGERY       [facility] NONSPECIFIC PROCEDURE      2 c-sections    [facility] NONSPECIFIC PROCEDURE      Appendectomy -- abstracted 7/15/02    [facility] NONSPECIFIC PROCEDURE      Left thumb tendon repair after injury         FAMILY HISTORY:  Family History   Problem Relation Age of Onset    Osteoporosis Mother     Unknown/Adopted Father          SOCIAL HISTORY:  Social History     Socioeconomic History    Marital status:      Spouse name: Not on file    Number of children: Not on file    Years of education: Not on file    Highest education level: Not on file   Occupational History    Not on file   Tobacco Use    Smoking status: Never    Smokeless tobacco: Never   Vaping Use    Vaping status: Never Used   Substance and Sexual Activity    Alcohol use: Yes     Comment: rare    Drug use: No    Sexual activity: Yes     Partners: Male     Birth control/protection: Female Surgical   Other Topics Concern    Parent/sibling w/ CABG, MI or angioplasty before 65F 55M? No   Social History Narrative    Not on file     Social Drivers of Health     Financial Resource Strain: Low Risk  (10/31/2024)    Financial Resource Strain     Within the past 12 months, have you or your family members you live with been unable to get utilities (heat, electricity) when it was really needed?: No   Food Insecurity: Low Risk  (10/31/2024)    Food Insecurity     Within the past 12 months, did you worry that your food would run out before you got money to buy more?: No     Within the past 12 months, did the food you bought just not last and you didn t have money to get more?: No   Transportation Needs: Low Risk  (10/31/2024)    Transportation Needs     Within the past 12 months, has lack of transportation kept you from medical appointments, getting your medicines, non-medical meetings or appointments, work, or from getting things that you need?: No   Physical Activity: Insufficiently Active (10/31/2024)    Exercise Vital Sign     Days of Exercise per Week: 2 days     Minutes  of Exercise per Session: 20 min   Stress: Stress Concern Present (10/31/2024)    [place] [employer] - Occupational Stress Questionnaire     Feeling of Stress : Rather much   Social Connections: Unknown (10/31/2024)    Social Connection and Isolation Panel [NHANES]     Frequency of Communication with Friends and Family: Not on file     Frequency of Social Gatherings with Friends and Family: Once a week     Attends Druze Services: Not on file     Active Member of Clubs or Organizations: Not on file     Attends Club or Organization Meetings: Not on file     Marital Status: Not on file   Interpersonal Safety: Low Risk  (10/31/2024)    Interpersonal Safety     Do you feel physically and emotionally safe where you currently live?: Yes     Within the past 12 months, have you been hit, slapped, kicked or otherwise physically hurt by someone?: No     Within the past 12 months, have you been humiliated or emotionally abused in other ways by your partner or ex-partner?: No   Housing Stability: Low Risk  (10/31/2024)    Housing Stability     Do you have housing? : Yes     Are you worried about losing your housing?: No         PHYSICAL EXAMINATION:  Vitals:    05/06/25 1332   BP: (!) 143/85   Pulse: 51   SpO2: 100%       GENERAL: Mild discomfort.  Pleasant and cooperative.   PSYCH:  Normal mood and affect.  HEAD:  Normocephalic.  SPEECH:  No dysarthria.  EYES:  No scleral icterus.  Wearing glasses.  EARS:  Hearing is intact to spoken voice.  NOSE:  Midline, symmetric, no rhinorrhea.  LUNGS:  No respiratory distress.  No increased work of breathing.        IMAGING:  EXAM: MR LUMBAR SPINE W/O CONTRAST  LOCATION: [facility]  DATE: 4/21/2025     INDICATION:  Lumbosacral disc herniation, Degeneration of intervertebral disc of lumbosacral region with discogenic back pain, Pain of right sacroiliac joint, Facet arthropathy, lumbosacral  COMPARISON: 04/11/2025, 07/02/2022  TECHNIQUE:  Routine Lumbar Spine MRI without IV contrast.     FINDINGS:      Nomenclature is based on 5 lumbar type vertebral bodies.      Normal vertebral body heights. There is a very subtle posterior listhesis of L2 on L3. T2 and T1 hyperintense foci in the L1 and L3 vertebral bodies are presumable meningiomas. No abnormal signal within the lumbar spine vertebral bodies. Normal distal spinal cord and cauda equina with conus medullaris at L1/L2. The paraspinal musculature is unremarkable. No acute findings in the partially visualized abdomen or pelvis. There are multiple cystic lesions within the left kidney that are non-diagnostically evaluated on the current exam.     T12-L1: Mild disc height loss. No herniation. Normal facets. No spinal canal or neural foraminal stenosis.      L1-L2: Disc height is preserved. No alvin herniation. There is a small posterior disc bulge along with mild bilateral facet arthropathy contributing to a mild bilateral neuroforaminal stenosis but no spinal canal stenosis.     L2-L3: Disc height is preserved. Fairly symmetrical posterior disc bulge along with right greater than left mild bilateral facet arthropathy which contribute to a mild canal stenosis and mild bilateral neuroforaminal stenosis.     L3-L4: Disc height is preserved. There is a fairly symmetrical small posterior disc bulge along with bilateral facet arthropathy contribute into a mild spinal canal stenosis and mild to moderate right and mild left neuroforaminal stenosis.     L4-L5: Disc height is preserved. There is a small fairly symmetrical posterior disc bulge along with bilateral facet arthropathy, greater on the left with ligamentum flavum hypertrophy contributing to a mild spinal canal stenosis along with a moderate right and mild to moderate left neuroforaminal stenosis.      L5-S1: Disc height is preserved. No alvin herniation. Mild degree of bilateral facet arthropathy however no spinal canal or neuroforaminal stenosis is  appreciated.    IMPRESSION:     Multilevel degenerative changes in the lumbar spine most pronounced at L4-L5 with a mild to moderate right neuroforaminal stenosis. There is no high-grade spinal canal or neuroforaminal stenosis visualized.        EXAM: XR LUMBAR SPINE G/E 4 VIEWS  LOCATION: [facility] [place]  DATE: 4/11/2025     INDICATION:  Lumbosacral disc herniation, Degeneration of intervertebral disc of lumbosacral region with discogenic back pain, Pain of right sacroiliac joint, Facet arthropathy, lumbosacral  COMPARISON: None.     IMPRESSION:   5 lumbar-type vertebral bodies. Mild levoconvex curvature of the lumbar spine the vertebral bodies of the lumbar spine otherwise have normal stature and alignment without evidence of compression fracture. On extension there is retrolisthesis of L3 on L4 measuring 3 mm. No other evidence of dynamic instability. Soft tissues unremarkable.          ASSESSMENT/PLAN:  Ms. Carlie Brumfield is a 55-year-old female.      Assessment & Plan  Renal cyst, left:  - Multiple cystic lesions within the left kidney noted, non-diagnostically evaluated on the MRI lumbar spine. No acute findings.  - Referral to a nephrologist for further evaluation.  - The patient also requests referral to urology for recurrent urinary tract infections.    Degeneration of intervertebral disc of lumbosacral region with discogenic back pain:  - Degenerative disc disease with disc bulge noted. Pain likely due to discogenic issues, exacerbated by sitting. No vertebrogenic signs of low back pain.  - Increase gabapentin dosage if no side effects are noted. Prescribe naproxen and acetaminophen for pain management. Consider muscle relaxer at bedtime to aid sleep. Physical therapy, chiropractic care, weight loss, and core strengthening recommended. Light duty work restrictions: 8-hour workday, lifting no greater than 10 lbs, 5 to 10-minute break every 1 to 2 hours.  - Risks and side  effects: Risk of seizures if gabapentin is stopped abruptly.  - Ms. Carlie Brumfield is scheduled for an L4-L5 interlaminar epidural corticosteroid injection on 05/29/2025.    Ms. Carlie Brumfield is being prescribed naproxen 500 mg p.o. twice daily, dispense 14-day supply, and acetaminophen 1000 mg p.o. 3 times daily, dispense 30-day supply.      Total time on the date of the encounter:  46 minutes were spent on one more or more of the following:  discussion with patient, history, exam, coordinating care, treatment goals, record review, documenting clinical information, and/or data review.    Consent was obtained from the patient to use an AI documentation tool in the creation of this note.      Alpesh Leger MD

## 2025-05-07 ENCOUNTER — THERAPY VISIT (OUTPATIENT)
Dept: PHYSICAL THERAPY | Facility: CLINIC | Age: 55
End: 2025-05-07
Payer: COMMERCIAL

## 2025-05-07 ENCOUNTER — TELEPHONE (OUTPATIENT)
Dept: ORTHOPEDICS | Facility: CLINIC | Age: 55
End: 2025-05-07

## 2025-05-07 ENCOUNTER — PATIENT OUTREACH (OUTPATIENT)
Dept: CARE COORDINATION | Facility: CLINIC | Age: 55
End: 2025-05-07

## 2025-05-07 DIAGNOSIS — M51.370 DEGENERATION OF INTERVERTEBRAL DISC OF LUMBOSACRAL REGION WITH DISCOGENIC BACK PAIN: Primary | ICD-10-CM

## 2025-05-07 PROCEDURE — 97110 THERAPEUTIC EXERCISES: CPT | Mod: GP | Performed by: PHYSICAL THERAPIST

## 2025-05-07 NOTE — TELEPHONE ENCOUNTER
PSP:  Dr. Leger  Last clinic visit:  5/6/25  Reason for call: Left message on nurse line at 1255 PM today asking for referral  Clinical information:  Stated in message she was given a referral to see a nephrologist for some cysts. Wonders if order/referral can be changed to a urologist as she does have history of UTIs.

## 2025-05-08 NOTE — TELEPHONE ENCOUNTER
"Per response from Dr. Leger: \"I have placed referrals to both nephrology and urology.\"     Phone call to patient to discuss. Patient states she has been contacted by nephrology already. Explained urology will be reaching out to her to get consult set up with them as well. Stated understanding.   "

## 2025-05-13 ENCOUNTER — PATIENT OUTREACH (OUTPATIENT)
Dept: CARE COORDINATION | Facility: CLINIC | Age: 55
End: 2025-05-13
Payer: COMMERCIAL

## 2025-05-21 ENCOUNTER — THERAPY VISIT (OUTPATIENT)
Dept: PHYSICAL THERAPY | Facility: CLINIC | Age: 55
End: 2025-05-21
Payer: COMMERCIAL

## 2025-05-21 DIAGNOSIS — M51.370 DEGENERATION OF INTERVERTEBRAL DISC OF LUMBOSACRAL REGION WITH DISCOGENIC BACK PAIN: Primary | ICD-10-CM

## 2025-05-21 PROCEDURE — 97110 THERAPEUTIC EXERCISES: CPT | Mod: GP | Performed by: PHYSICAL THERAPIST

## 2025-05-21 PROCEDURE — 97112 NEUROMUSCULAR REEDUCATION: CPT | Mod: GP | Performed by: PHYSICAL THERAPIST

## 2025-05-29 ENCOUNTER — RADIOLOGY INJECTION OFFICE VISIT (OUTPATIENT)
Dept: PALLIATIVE MEDICINE | Facility: CLINIC | Age: 55
End: 2025-05-29
Attending: PHYSICAL MEDICINE & REHABILITATION
Payer: COMMERCIAL

## 2025-05-29 VITALS — DIASTOLIC BLOOD PRESSURE: 55 MMHG | OXYGEN SATURATION: 98 % | HEART RATE: 52 BPM | SYSTOLIC BLOOD PRESSURE: 113 MMHG

## 2025-05-29 DIAGNOSIS — M47.817 FACET ARTHROPATHY, LUMBOSACRAL: ICD-10-CM

## 2025-05-29 DIAGNOSIS — M51.370 DEGENERATION OF INTERVERTEBRAL DISC OF LUMBOSACRAL REGION WITH DISCOGENIC BACK PAIN: Primary | ICD-10-CM

## 2025-05-29 DIAGNOSIS — M54.16 LUMBAR RADICULOPATHY: ICD-10-CM

## 2025-05-29 RX ORDER — DEXAMETHASONE SODIUM PHOSPHATE 10 MG/ML
10 INJECTION, SOLUTION INTRAMUSCULAR; INTRAVENOUS ONCE
Status: COMPLETED | OUTPATIENT
Start: 2025-05-29 | End: 2025-05-29

## 2025-05-29 RX ORDER — LIDOCAINE HYDROCHLORIDE 10 MG/ML
2.5 INJECTION, SOLUTION EPIDURAL; INFILTRATION; INTRACAUDAL; PERINEURAL ONCE
Status: COMPLETED | OUTPATIENT
Start: 2025-05-29 | End: 2025-05-29

## 2025-05-29 RX ADMIN — LIDOCAINE HYDROCHLORIDE 2.5 ML: 10 INJECTION, SOLUTION EPIDURAL; INFILTRATION; INTRACAUDAL; PERINEURAL at 09:23

## 2025-05-29 RX ADMIN — DEXAMETHASONE SODIUM PHOSPHATE 10 MG: 10 INJECTION, SOLUTION INTRAMUSCULAR; INTRAVENOUS at 09:23

## 2025-05-29 ASSESSMENT — PAIN SCALES - GENERAL: PAINLEVEL_OUTOF10: MODERATE PAIN (4)

## 2025-05-29 NOTE — NURSING NOTE
Discharge Information    IV Discontiued Time:  NA    Amount of Fluid Infused:  NA    Discharge Criteria = When patient returns to baseline or as per MD order    Consciousness:  Pt is fully awake    Circulation:  BP +/- 20% of pre-procedure level    Respiration:  Patient is able to breathe deeply    O2 Sat:  Patient is able to maintain O2 Sat >92% on room air    Activity:  Moves 4 extremities on command    Ambulation:  Patient is able to stand and walk or stand and pivot into wheelchair    Dressing:  Clean/dry or No Dressing    Notes:   Discharge instructions and AVS given to patient    Patient meets criteria for discharge?  YES    Admitted to PCU?  No    Responsible adult present to accompany patient home?  Yes    Signature/Title:    Ayleen Hernandez RN  RN Care Coordinator  [facility] [place]

## 2025-05-29 NOTE — PATIENT INSTRUCTIONS
[facility] Procedure Discharge Instructions    Today you saw:   Dr. Steve Leger    Your procedure:  Epidural steroid injection     Medications used:  Lidocaine (anesthetic)    Dexamethasone (steroid)  Omnipaque (contrast)             Be cautious when walking as numbness and/or weakness in the legs may occur up to 6-8 hours after the procedure due to effect of the local anesthetic  Do not drive for 6 hours. The effect of the local anesthetic could slow your reflexes.   Avoid strenuous activity for the first 24 hours. You may resume your regular activities after that.   You may shower, however avoid swimming, tub baths or hot tubs for 24 hours following your procedure  You may have a mild to moderate increase in pain for several days following the injection.    You may use ice packs for 10-15 minutes, 3 to 4 times a day at the injection site for comfort  Do not use heat to painful areas for 6 to 8 hours. This will give the local anesthetic time to wear off and prevent you from accidentally burning your skin.  Unless you have been directed to avoid the use of anti-inflammatory medications (NSAIDS-ibuprofen, Aleve, Motrin), you may use these medications or Tylenol for pain control if needed.   With diabetes, check your blood sugar more frequently than usual as your blood sugar may be higher than normal for 10-14 days following a steroid injection. Contact your doctor who manages your diabetes if your blood sugar is higher than usual  Possible side effects of steroids that you may experience include flushing, elevated blood pressure, increased appetite, mild headaches and restlessness.  All of these symptoms will get better with time.  It may take up to 14 days for the steroid medication to start working although you may feel the effect as early as a few days after the procedure.   Follow up with your referring provider in 2-3 weeks- Dr Leger    If you experience any of the following, call the Spine  Center line during work hours at [phone]:  -Fever over 100 degree F  -Swelling, bleeding, redness, drainage, warmth at the injection site  -Progressive weakness or numbness in your legs   -Loss of bowel or bladder function  -Unusual headache that is not relieved by Tylenol or your regular headache medication  -Unusual new onset of pain that is not improving

## 2025-05-29 NOTE — PROGRESS NOTES
"PHYSICAL MEDICINE & REHABILITATION / MEDICAL SPINE      PROCEDURE DATE:  May 29, 2025      PATIENT NAME:  Carlie Brumfield  MRN:  [id number]  YOB: 1970      PRE-PROCEDURE DIAGNOSIS:  1. Degeneration of intervertebral disc of lumbosacral region with discogenic back pain    2. Facet arthropathy, lumbosacral    3. Lumbar radiculopathy        POST-PROCEDURE DIAGNOSIS:  1. Degeneration of intervertebral disc of lumbosacral region with discogenic back pain    2. Facet arthropathy, lumbosacral    3. Lumbar radiculopathy        PROCEDURE:  Fluoroscopic-guided right paramedian L4-L5 interlaminar epidural steroid injection.  (CPT code:  39367)      PROCEDURE IN DETAIL:  Prior to the procedure, educational material was provided for the patient to review and take home.  After a discussion of the risks, benefits, and alternatives to the procedure, the patient expressed understanding and wished to proceed.  Consent form was signed.  The patient was brought to the fluoroscopy suite and placed in the prone position.  Procedural pause was conducted to verify:  patient identity, procedure to be performed, laterality, site, and patient position.    The skin was sterilely prepped using chlorhexidine and allowed to dry.  The skin was draped in the usual sterile fashion.  After identifying the L4-L5 interspace with fluoroscopy, the skin and subcutaneous tissue were infiltrated with 2 ml 1% preservative-free lidocaine.  Utilizing a loss of resistance technique and intermittent fluoroscopic guidance, 20g 3.5\" Tuohy needle was advanced into the epidural space using a right paramedian approach.  Proper needle positioning was confirmed using multiple fluoroscopic views.  The epidural space was accessed at a depth of 8.5 cm.  After negative aspiration, 11.1 ml Omnipaque 300 (iohexol) was injected confirming epidural spread without evidence of intravascular or intrathecal spread.  Next, 10 mg dexamethasone and 4 ml " preservative-free normal saline were injected.  Following the injection, the needle was withdrawn slightly and flushed with 0.5 ml preservative-free 1% lidocaine as it was fully extracted.    The patient tolerated the procedure well, and there were no apparent complications.  The patient was escorted back to the postprocedure room.  The patient was monitored for side effects.  No reactions were noted.  After appropriate observation, the patient was dismissed from the clinic in good condition.    Preprocedure pain level: 4/10.  Postprocedure pain level: 5/10.      Alpesh Leger MD

## 2025-05-29 NOTE — NURSING NOTE
Pre-procedure Intake  If YES to any questions or NO to having a   Please complete laminated checklist and leave on the computer keyboard for Provider, verbally inform provider if able.    For SCS Trial, RFA's or any sedation procedure:  Have you been fasting? NA  If yes, for how long?     Are you taking any any blood thinners such as Coumadin, Warfarin, Jantoven, Pradaxa Xarelto, Eliquis, Edoxaban, Enoxaparin, Lovenox, Heparin, Arixtra, Fondaparinux, or Fragmin? OR Antiplatelet medication such as Plavix, Brilinta, or Effient?   No   If yes, when did you take your last dose?     Do you take aspirin?  No  If cervical procedure, have you held aspirin for 6 days?   NA    Is the Pt taking any GLP-1 Antagonist (hold needed for sedation patients only)  (semaglutide (Ozempic, Wegovy), dulaglutide (Trulicity), exenatide ER (Bydureon), tirzepatide (Mounjaro), Liraglutide (Saxenda, Victoza), semaglutide (Rybelsus)     NA  If yes, when did you take your last dose?     Do you have any allergies to contrast dye, iodine, steroid and/or numbing medications?  NO    Are you currently taking antibiotics or have an active infection?  NO    Have you had a fever/elevated temperature within the past week? NO    Are you currently taking oral steroids? NO    Do you have a ? Yes    Are you pregnant or breastfeeding?  Not Applicable    Have you received any vaccinations in the last week? NO    Notify provider and RNs if systolic BP >170, diastolic BP >100, P >100 or O2 sats < 90%      Christa Bishop MA  [facility]

## 2025-06-12 ENCOUNTER — OFFICE VISIT (OUTPATIENT)
Dept: UROLOGY | Facility: CLINIC | Age: 55
End: 2025-06-12
Attending: PHYSICAL MEDICINE & REHABILITATION
Payer: COMMERCIAL

## 2025-06-12 VITALS — HEART RATE: 53 BPM | SYSTOLIC BLOOD PRESSURE: 129 MMHG | DIASTOLIC BLOOD PRESSURE: 65 MMHG

## 2025-06-12 DIAGNOSIS — R35.0 URINARY FREQUENCY: Primary | ICD-10-CM

## 2025-06-12 DIAGNOSIS — N28.1 RENAL CYST, LEFT: ICD-10-CM

## 2025-06-12 LAB
ALBUMIN UR-MCNC: NEGATIVE MG/DL
APPEARANCE UR: CLEAR
BILIRUB UR QL STRIP: NEGATIVE
COLOR UR AUTO: YELLOW
GLUCOSE UR STRIP-MCNC: NEGATIVE MG/DL
HGB UR QL STRIP: NEGATIVE
KETONES UR STRIP-MCNC: NEGATIVE MG/DL
LEUKOCYTE ESTERASE UR QL STRIP: NEGATIVE
NITRATE UR QL: NEGATIVE
PH UR STRIP: 7 [PH] (ref 5–7)
RESIDUAL VOLUME (RV) (EXTERNAL): 150
SP GR UR STRIP: 1.01 (ref 1–1.03)
UROBILINOGEN UR STRIP-ACNC: 0.2 E.U./DL

## 2025-06-12 RX ORDER — IBUPROFEN 600 MG/1
TABLET, FILM COATED ORAL
COMMUNITY
Start: 2024-10-04

## 2025-06-12 ASSESSMENT — PAIN SCALES - GENERAL: PAINLEVEL_OUTOF10: MILD PAIN (3)

## 2025-06-12 NOTE — NURSING NOTE
Chief Complaint   Patient presents with    renal cyst    recurrent UTI    Urinary Frequency     Pt reports urinary frequency, recurrent UTI's.  No current UTI symptoms.  Denies gross hematuria    PVR: 150 mL by bladder scan      Minda Jackson, Clinic Assistant

## 2025-06-12 NOTE — LETTER
6/12/2025       RE: Carlie Brumfield  [id number] [facility] [id number]     Dear Colleague,    Thank you for referring your patient, Carlie Brumfield, to the [facility]OLOG[facility] [place] at [facility]. Please see a copy of my visit note below.          Chief Complaint:   Kidney cysts  History of urinary tract infections           Consult or Referral:     Mr. Carlie Brumfield is a 55 year old female seen at the request of Dr. Leger.         History of Present Illness:     Carlie Brumfield is a 55 year old female being seen for kidney cyst and history of urinary tract infections .  Duration of problem: few months/years  Previous treatments: She has a history of hysterectomy with the transobturator sling placement for urinary incontinence      Reviewed previous notes from Dr. Ganser    Carlie had a MRI for her back and discomfort  She has also had some UTIs in the last few years which have been incidentally detected  She has ongoing issues with frequency and urgency and occasional incontinence only had a hysterectomy around 2012 with a transobturator sling  She is not exactly sure what her symptoms were at that time but she feels that her frequency and urgency symptoms have never really gone away  Regarding urinary tract infections I did not see any culture results here but a couple of the urinary analysis with positive nitrites  Not sure whether the was were contaminated samples               Past Medical History:     Past Medical History:   Diagnosis Date     History of blood transfusion     During hysterectomy     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    Hospitalized     Thyroid disease      Unspecified urinary incontinence     Urgency incontinence -- abstracted 7/15/02            Past Surgical History:     Past Surgical History:   Procedure Laterality Date     APPENDECTOMY       BIOPSY      Breast     COLONOSCOPY N/A 7/10/2018     Procedure: COLONOSCOPY;  Colonoscopy ([facility])  ;  Surgeon: Lino Lopez MD;  Location: [facility]     DESTRUCTION OF PARAVERTEBRAL FACET LUMBAR / SACRAL SINGLE Right 10/5/2022    Procedure: radiofrequency ablations / neurotomies of the right L3-L4 and L4-L5 facet joints;  Surgeon: Alpesh Leger MD;  Location: [facility] OR     DESTRUCTION OF PARAVERTEBRAL FACET LUMBAR / SACRAL SINGLE Left 10/12/2022    Procedure: radiofrequency ablations / neurotomies of the left L3-L4 and L4-L5 facet joints;  Surgeon: Alpesh Leger MD;  Location: [facility] OR     ESOPHAGOSCOPY, GASTROSCOPY, DUODENOSCOPY (EGD), COMBINED       ESOPHAGOSCOPY, GASTROSCOPY, DUODENOSCOPY (EGD), COMBINED N/A 12/18/2015    Procedure: COMBINED ESOPHAGOSCOPY, GASTROSCOPY, DUODENOSCOPY (EGD), BIOPSY SINGLE OR MULTIPLE;  Surgeon: Lino Lopez MD;  Location:  GI     GI SURGERY      bariatric surgery     HYSTERECTOMY, PAP NO LONGER INDICATED       ORTHOPEDIC SURGERY       [facility] NONSPECIFIC PROCEDURE      2 c-sections     [facility] NONSPECIFIC PROCEDURE      Appendectomy -- abstracted 7/15/02     [facility] NONSPECIFIC PROCEDURE      Left thumb tendon repair after injury            Medications     Current Outpatient Medications   Medication Sig Dispense Refill     Acetaminophen (TYLENOL PO) Take by mouth.       gabapentin (NEURONTIN) 300 MG capsule Take 2 capsules (600 mg) by mouth 3 times daily. 360 capsule 0     ibuprofen (ADVIL/MOTRIN) 600 MG tablet TAKE 1 TABLET BY MOUTH EVERY 6 HR AS NEED FOR PAIN       levothyroxine (SYNTHROID/LEVOTHROID) 125 MCG tablet Take 1 tablet (125 mcg) by mouth daily. Skip taking one day per week. 90 tablet 3     oxyCODONE (ROXICODONE) 5 MG tablet Take 1 tablet (5 mg) by mouth every 4 hours as needed for moderate to severe pain. 15 tablet 0     No current facility-administered medications for this visit.            Family History:     Family History   Problem Relation Age of Onset     Osteoporosis Mother      Unknown/Adopted Father              Social History:     Social History     Socioeconomic History     Marital status:      Spouse name: Not on file     Number of children: Not on file     Years of education: Not on file     Highest education level: Not on file   Occupational History     Not on file   Tobacco Use     Smoking status: Never     Smokeless tobacco: Never   Vaping Use     Vaping status: Never Used   Substance and Sexual Activity     Alcohol use: Yes     Comment: rare     Drug use: No     Sexual activity: Yes     Partners: Male     Birth control/protection: Female Surgical   Other Topics Concern     Parent/sibling w/ CABG, MI or angioplasty before 65F 55M? No   Social History Narrative     Not on file     Social Drivers of Health     Financial Resource Strain: Low Risk  (10/31/2024)    Financial Resource Strain      Within the past 12 months, have you or your family members you live with been unable to get utilities (heat, electricity) when it was really needed?: No   Food Insecurity: Low Risk  (10/31/2024)    Food Insecurity      Within the past 12 months, did you worry that your food would run out before you got money to buy more?: No      Within the past 12 months, did the food you bought just not last and you didn t have money to get more?: No   Transportation Needs: Low Risk  (10/31/2024)    Transportation Needs      Within the past 12 months, has lack of transportation kept you from medical appointments, getting your medicines, non-medical meetings or appointments, work, or from getting things that you need?: No   Physical Activity: Insufficiently Active (10/31/2024)    Exercise Vital Sign      Days of Exercise per Week: 2 days      Minutes of Exercise per Session: 20 min   Stress: Stress Concern Present (10/31/2024)    [employer] [place] [employer] - Occupational Stress Questionnaire      Feeling of Stress : Rather much   Social Connections: Unknown (10/31/2024)    Social Connection and Isolation Panel [NHANES]       Frequency of Communication with Friends and Family: Not on file      Frequency of Social Gatherings with Friends and Family: Once a week      Attends Alevism Services: Not on file      Active Member of Clubs or Organizations: Not on file      Attends Club or Organization Meetings: Not on file      Marital Status: Not on file   Interpersonal Safety: Low Risk  (10/31/2024)    Interpersonal Safety      Do you feel physically and emotionally safe where you currently live?: Yes      Within the past 12 months, have you been hit, slapped, kicked or otherwise physically hurt by someone?: No      Within the past 12 months, have you been humiliated or emotionally abused in other ways by your partner or ex-partner?: No   Housing Stability: Low Risk  (10/31/2024)    Housing Stability      Do you have housing? : Yes      Are you worried about losing your housing?: No            Allergies:   No known allergies         Review of Systems:  From intake questionnaire     Skin: negative  Eyes: negative  Ears/Nose/Throat: negative  Respiratory: No shortness of breath, dyspnea on exertion, cough, or hemoptysis  Cardiovascular: No chest pain or palpitations  Gastrointestinal: negative; no nausea/vomiting, constipation or diarrhea  Genitourinary: as per HPI  Musculoskeletal: negative  Neurologic: negative  Psychiatric: negative  Hematologic/Lymphatic/Immunologic: negative  Endocrine: negative         Physical Exam:     Patient is a 55 year old  female   Vitals: Blood pressure 129/65, pulse 53, last menstrual period 06/02/2011, not currently breastfeeding.  Constitutional: There is no height or weight on file to calculate BMI.  Alert, no acute distress, oriented, conversant  Eyes: no scleral icterus; extraocular muscles intact, moist conjunctivae  Neck: trachea midline, no thyromegaly  Ears/nose/mouth: throat/mouth:normal, good dentition  Respiratory: no respiratory distress, or pursed lip breathing  Cardiovascular: pulses strong and  "intact; no obvious jugular venous distension present  Gastrointestinal: soft, nontender, no organomegaly or masses,   Lymphatics: No inguinal adenopathy  Musculoskeletal: extremities normal, no peripheral edema  Skin: no suspicious lesions or rashes  Neuro: Alert, oriented, speech and mentation normal  Psych: affect and mood normal, alert and oriented to person, place and time  Gait: Normal  : deferred      Labs and Pathology:    The following labs were reviewed by me and discussed with the patient:  UA:   Component      Latest Ref Rng 6/12/2025  1:47 PM   Color Urine      Colorless, Straw, Light Yellow, Yellow  Yellow    Appearance Urine      Clear  Clear    Glucose Urine      Negative mg/dL Negative    Bilirubin Urine      Negative  Negative    Ketones Urine      Negative mg/dL Negative    Specific Gravity Urine      1.003 - 1.035  1.010    Blood Urine      Negative  Negative    pH Urine      5.0 - 7.0  7.0    Protein Albumin Urine      Negative mg/dL Negative    Urobilinogen Urine      0.2, 1.0 E.U./dL 0.2    Nitrite Urine      Negative  Negative    Leukocyte Esterase Urine      Negative  Negative        Significant for   Lab Results   Component Value Date    CR 0.86 10/31/2024    CR 0.82 10/06/2023    CR 0.76 09/23/2021    CR 0.74 03/30/2021    CR 0.70 07/03/2020    CR 0.89 06/12/2018    CR 0.83 11/22/2016    CR 0.76 03/21/2016    CR 0.81 12/14/2015    CR 0.68 04/21/2014    CR 0.70 02/01/2013    CR 0.89 08/08/2012    CR 0.91 06/17/2011     No results found for: \"PSA\"          Imaging:    The following imaging exams were independently viewed and interpreted by me and discussed with patient:  MRI thoracolumbar spine: I reviewed her MRI images and saw that therewas a large cyst on the left kidney which appeared to be in the parapelvic aspect of the inferior pole location    PVR: 140 mL           Assessment and Plan:     Renal cyst, left  Discussed about need for further imaging with CT urogram in view of the " large cysts with some concerns of possible hydro on the left side based on my assessment  CT urogram would be helpful in clarifying these concerns  Discussed that I will update her on MyChart regarding the results and follow-up as needed for this  - Adult Urology  Referral  - UA without Microscopic [[id number]]; Future  - UA without Microscopic [[id number]]  - CT Urogram wo & w Contrast; Future    Urinary frequency  History of increased urinary frequency as well as incontinence  Prior history of significant gynecological procedures  Concerns for possible recurrent UTIs  Discussed in detail about preventive strategies as well as behavioral modification including Kegel exercises timed voiding double voiding to ensure adequate emptying  She perhaps be benefited by seeing Dr. Doyle in the clinic in the next few months  - MEASURE POST-VOID RESIDUAL URINE/BLADDER CAPACITY, US NON-IMAGING      Plan:  CT urogram  Follow-up with Dr. Doyle    Orders  Orders Placed This Encounter   Procedures     MEASURE POST-VOID RESIDUAL URINE/BLADDER CAPACITY, US NON-IMAGING     CT Urogram wo & w Contrast     UA without Microscopic [[id number]]       Alonso Ridley MD  [facility] UROLOGY [facility] [place]      ==========================    Additional Billing and Coding Information:  Review of external notes as documented above   Review of the result(s) of each unique test - UA, creatinine, PVR,    Independent interpretation of a test performed by another physician/other qualified health care professional (not separately reported) -I independently reviewed the MRI images for renal cyst evaluation            25 minutes spent by me on the date of the encounter doing chart review, review of test results, interpretation of tests, patient visit, and documentation     ==========================    Again, thank you for allowing me to participate in the care of your patient.      Sincerely,    Alonso Ridley MD

## 2025-06-12 NOTE — PROGRESS NOTES
Chief Complaint:   Kidney cysts  History of urinary tract infections           Consult or Referral:     Mr. Carlie Brumfield is a 55 year old female seen at the request of Dr. Leger.         History of Present Illness:     Carlie Brumfield is a 55 year old female being seen for kidney cyst and history of urinary tract infections .  Duration of problem: few months/years  Previous treatments: She has a history of hysterectomy with the transobturator sling placement for urinary incontinence      Reviewed previous notes from Dr. Ganser Lori had a MRI for her back and discomfort  She has also had some UTIs in the last few years which have been incidentally detected  She has ongoing issues with frequency and urgency and occasional incontinence only had a hysterectomy around 2012 with a transobturator sling  She is not exactly sure what her symptoms were at that time but she feels that her frequency and urgency symptoms have never really gone away  Regarding urinary tract infections I did not see any culture results here but a couple of the urinary analysis with positive nitrites  Not sure whether the was were contaminated samples               Past Medical History:     Past Medical History:   Diagnosis Date    History of blood transfusion     During hysterectomy    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    Hospitalized    Thyroid disease     Unspecified urinary incontinence     Urgency incontinence -- abstracted 7/15/02            Past Surgical History:     Past Surgical History:   Procedure Laterality Date    APPENDECTOMY      BIOPSY      Breast    COLONOSCOPY N/A 7/10/2018    Procedure: COLONOSCOPY;  Colonoscopy ([facility])  ;  Surgeon: Lino Lopez MD;  Location:  GI    DESTRUCTION OF PARAVERTEBRAL FACET LUMBAR / SACRAL SINGLE Right 10/5/2022    Procedure: radiofrequency ablations / neurotomies of the right L3-L4 and L4-L5 facet joints;  Surgeon: Alpesh Leger MD;  Location: [facility]     DESTRUCTION OF PARAVERTEBRAL FACET LUMBAR / SACRAL SINGLE Left 10/12/2022    Procedure: radiofrequency ablations / neurotomies of the left L3-L4 and L4-L5 facet joints;  Surgeon: Alpseh Leger MD;  Location: [facility] OR    ESOPHAGOSCOPY, GASTROSCOPY, DUODENOSCOPY (EGD), COMBINED      ESOPHAGOSCOPY, GASTROSCOPY, DUODENOSCOPY (EGD), COMBINED N/A 12/18/2015    Procedure: COMBINED ESOPHAGOSCOPY, GASTROSCOPY, DUODENOSCOPY (EGD), BIOPSY SINGLE OR MULTIPLE;  Surgeon: Lino Lopez MD;  Location:  GI    GI SURGERY      bariatric surgery    HYSTERECTOMY, PAP NO LONGER INDICATED      ORTHOPEDIC SURGERY      [facility] NONSPECIFIC PROCEDURE      2 c-sections    [facility] NONSPECIFIC PROCEDURE      Appendectomy -- abstracted 7/15/02    [facility] NONSPECIFIC PROCEDURE      Left thumb tendon repair after injury            Medications     Current Outpatient Medications   Medication Sig Dispense Refill    Acetaminophen (TYLENOL PO) Take by mouth.      gabapentin (NEURONTIN) 300 MG capsule Take 2 capsules (600 mg) by mouth 3 times daily. 360 capsule 0    ibuprofen (ADVIL/MOTRIN) 600 MG tablet TAKE 1 TABLET BY MOUTH EVERY 6 HR AS NEED FOR PAIN      levothyroxine (SYNTHROID/LEVOTHROID) 125 MCG tablet Take 1 tablet (125 mcg) by mouth daily. Skip taking one day per week. 90 tablet 3    oxyCODONE (ROXICODONE) 5 MG tablet Take 1 tablet (5 mg) by mouth every 4 hours as needed for moderate to severe pain. 15 tablet 0     No current facility-administered medications for this visit.            Family History:     Family History   Problem Relation Age of Onset    Osteoporosis Mother     Unknown/Adopted Father             Social History:     Social History     Socioeconomic History    Marital status:      Spouse name: Not on file    Number of children: Not on file    Years of education: Not on file    Highest education level: Not on file   Occupational History    Not on file   Tobacco Use    Smoking status: Never    Smokeless tobacco: Never   Vaping  Use    Vaping status: Never Used   Substance and Sexual Activity    Alcohol use: Yes     Comment: rare    Drug use: No    Sexual activity: Yes     Partners: Male     Birth control/protection: Female Surgical   Other Topics Concern    Parent/sibling w/ CABG, MI or angioplasty before 65F 55M? No   Social History Narrative    Not on file     Social Drivers of Health     Financial Resource Strain: Low Risk  (10/31/2024)    Financial Resource Strain     Within the past 12 months, have you or your family members you live with been unable to get utilities (heat, electricity) when it was really needed?: No   Food Insecurity: Low Risk  (10/31/2024)    Food Insecurity     Within the past 12 months, did you worry that your food would run out before you got money to buy more?: No     Within the past 12 months, did the food you bought just not last and you didn t have money to get more?: No   Transportation Needs: Low Risk  (10/31/2024)    Transportation Needs     Within the past 12 months, has lack of transportation kept you from medical appointments, getting your medicines, non-medical meetings or appointments, work, or from getting things that you need?: No   Physical Activity: Insufficiently Active (10/31/2024)    Exercise Vital Sign     Days of Exercise per Week: 2 days     Minutes of Exercise per Session: 20 min   Stress: Stress Concern Present (10/31/2024)    [employer] [place] [employer] - Occupational Stress Questionnaire     Feeling of Stress : Rather much   Social Connections: Unknown (10/31/2024)    Social Connection and Isolation Panel [NHANES]     Frequency of Communication with Friends and Family: Not on file     Frequency of Social Gatherings with Friends and Family: Once a week     Attends Scientologist Services: Not on file     Active Member of Clubs or Organizations: Not on file     Attends Club or Organization Meetings: Not on file     Marital Status: Not on file   Interpersonal Safety: Low Risk   (10/31/2024)    Interpersonal Safety     Do you feel physically and emotionally safe where you currently live?: Yes     Within the past 12 months, have you been hit, slapped, kicked or otherwise physically hurt by someone?: No     Within the past 12 months, have you been humiliated or emotionally abused in other ways by your partner or ex-partner?: No   Housing Stability: Low Risk  (10/31/2024)    Housing Stability     Do you have housing? : Yes     Are you worried about losing your housing?: No            Allergies:   No known allergies         Review of Systems:  From intake questionnaire     Skin: negative  Eyes: negative  Ears/Nose/Throat: negative  Respiratory: No shortness of breath, dyspnea on exertion, cough, or hemoptysis  Cardiovascular: No chest pain or palpitations  Gastrointestinal: negative; no nausea/vomiting, constipation or diarrhea  Genitourinary: as per HPI  Musculoskeletal: negative  Neurologic: negative  Psychiatric: negative  Hematologic/Lymphatic/Immunologic: negative  Endocrine: negative         Physical Exam:     Patient is a 55 year old  female   Vitals: Blood pressure 129/65, pulse 53, last menstrual period 06/02/2011, not currently breastfeeding.  Constitutional: There is no height or weight on file to calculate BMI.  Alert, no acute distress, oriented, conversant  Eyes: no scleral icterus; extraocular muscles intact, moist conjunctivae  Neck: trachea midline, no thyromegaly  Ears/nose/mouth: throat/mouth:normal, good dentition  Respiratory: no respiratory distress, or pursed lip breathing  Cardiovascular: pulses strong and intact; no obvious jugular venous distension present  Gastrointestinal: soft, nontender, no organomegaly or masses,   Lymphatics: No inguinal adenopathy  Musculoskeletal: extremities normal, no peripheral edema  Skin: no suspicious lesions or rashes  Neuro: Alert, oriented, speech and mentation normal  Psych: affect and mood normal, alert and oriented to person, place  "and time  Gait: Normal  : deferred      Labs and Pathology:    The following labs were reviewed by me and discussed with the patient:  UA:   Component      Latest Ref Rng 6/12/2025  1:47 PM   Color Urine      Colorless, Straw, Light Yellow, Yellow  Yellow    Appearance Urine      Clear  Clear    Glucose Urine      Negative mg/dL Negative    Bilirubin Urine      Negative  Negative    Ketones Urine      Negative mg/dL Negative    Specific Gravity Urine      1.003 - 1.035  1.010    Blood Urine      Negative  Negative    pH Urine      5.0 - 7.0  7.0    Protein Albumin Urine      Negative mg/dL Negative    Urobilinogen Urine      0.2, 1.0 E.U./dL 0.2    Nitrite Urine      Negative  Negative    Leukocyte Esterase Urine      Negative  Negative        Significant for   Lab Results   Component Value Date    CR 0.86 10/31/2024    CR 0.82 10/06/2023    CR 0.76 09/23/2021    CR 0.74 03/30/2021    CR 0.70 07/03/2020    CR 0.89 06/12/2018    CR 0.83 11/22/2016    CR 0.76 03/21/2016    CR 0.81 12/14/2015    CR 0.68 04/21/2014    CR 0.70 02/01/2013    CR 0.89 08/08/2012    CR 0.91 06/17/2011     No results found for: \"PSA\"          Imaging:    The following imaging exams were independently viewed and interpreted by me and discussed with patient:  MRI thoracolumbar spine: I reviewed her MRI images and saw that therewas a large cyst on the left kidney which appeared to be in the parapelvic aspect of the inferior pole location    PVR: 140 mL           Assessment and Plan:     Renal cyst, left  Discussed about need for further imaging with CT urogram in view of the large cysts with some concerns of possible hydro on the left side based on my assessment  CT urogram would be helpful in clarifying these concerns  Discussed that I will update her on MyChart regarding the results and follow-up as needed for this  - Adult Urology  Referral  - UA without Microscopic [[id number]]; Future  - UA without Microscopic [[id number]]  - CT " Urogram wo & w Contrast; Future    Urinary frequency  History of increased urinary frequency as well as incontinence  Prior history of significant gynecological procedures  Concerns for possible recurrent UTIs  Discussed in detail about preventive strategies as well as behavioral modification including Kegel exercises timed voiding double voiding to ensure adequate emptying  She perhaps be benefited by seeing Dr. Doyle in the clinic in the next few months  - MEASURE POST-VOID RESIDUAL URINE/BLADDER CAPACITY, US NON-IMAGING      Plan:  CT urogram  Follow-up with Dr. Doyle    Orders  Orders Placed This Encounter   Procedures    MEASURE POST-VOID RESIDUAL URINE/BLADDER CAPACITY, US NON-IMAGING    CT Urogram wo & w Contrast    UA without Microscopic [[id number]]       Alonso Ridley MD  [facility] UROLOGY CLINIC [place]      ==========================    Additional Billing and Coding Information:  Review of external notes as documented above   Review of the result(s) of each unique test - UA, creatinine, PVR,    Independent interpretation of a test performed by another physician/other qualified health care professional (not separately reported) -I independently reviewed the MRI images for renal cyst evaluation            25 minutes spent by me on the date of the encounter doing chart review, review of test results, interpretation of tests, patient visit, and documentation     ==========================

## 2025-06-12 NOTE — PATIENT INSTRUCTIONS
Ct Urogram to be done  Discussed timed voiding, double voiding and kegel's exercises  Needs follow-up with Dr Doyle/ Team to discuss her bladder concerns  I will update her on CT results

## 2025-06-16 ENCOUNTER — NURSE TRIAGE (OUTPATIENT)
Dept: INTERNAL MEDICINE | Facility: CLINIC | Age: 55
End: 2025-06-16

## 2025-06-16 ENCOUNTER — OFFICE VISIT (OUTPATIENT)
Dept: INTERNAL MEDICINE | Facility: CLINIC | Age: 55
End: 2025-06-16
Payer: COMMERCIAL

## 2025-06-16 ENCOUNTER — ANCILLARY PROCEDURE (OUTPATIENT)
Dept: GENERAL RADIOLOGY | Facility: CLINIC | Age: 55
End: 2025-06-16
Attending: INTERNAL MEDICINE
Payer: COMMERCIAL

## 2025-06-16 VITALS
SYSTOLIC BLOOD PRESSURE: 152 MMHG | HEIGHT: 66 IN | OXYGEN SATURATION: 100 % | TEMPERATURE: 97.9 F | WEIGHT: 161.2 LBS | HEART RATE: 66 BPM | BODY MASS INDEX: 25.91 KG/M2 | DIASTOLIC BLOOD PRESSURE: 82 MMHG

## 2025-06-16 DIAGNOSIS — M54.50 LUMBAR PAIN: ICD-10-CM

## 2025-06-16 DIAGNOSIS — R07.9 CHEST PAIN, UNSPECIFIED TYPE: ICD-10-CM

## 2025-06-16 DIAGNOSIS — M54.50 LUMBAR PAIN: Primary | ICD-10-CM

## 2025-06-16 LAB
ALBUMIN UR-MCNC: NEGATIVE MG/DL
APPEARANCE UR: CLEAR
BILIRUB UR QL STRIP: NEGATIVE
COLOR UR AUTO: YELLOW
FERRITIN SERPL-MCNC: 15 NG/ML (ref 11–328)
GLUCOSE UR STRIP-MCNC: NEGATIVE MG/DL
HGB UR QL STRIP: NEGATIVE
IRON BINDING CAPACITY (ROCHE): 327 UG/DL (ref 240–430)
IRON SATN MFR SERPL: 12 % (ref 15–46)
IRON SERPL-MCNC: 39 UG/DL (ref 37–145)
KETONES UR STRIP-MCNC: NEGATIVE MG/DL
LEUKOCYTE ESTERASE UR QL STRIP: NEGATIVE
NITRATE UR QL: NEGATIVE
PH UR STRIP: 6 [PH] (ref 5–7)
SP GR UR STRIP: 1.01 (ref 1–1.03)
T4 FREE SERPL-MCNC: 1.63 NG/DL (ref 0.9–1.7)
TROPONIN T SERPL HS-MCNC: 9 NG/L
TSH SERPL DL<=0.005 MIU/L-ACNC: 0.02 UIU/ML (ref 0.3–4.2)
UROBILINOGEN UR STRIP-ACNC: 0.2 E.U./DL
VIT B12 SERPL-MCNC: >4000 PG/ML (ref 232–1245)

## 2025-06-16 PROCEDURE — 83550 IRON BINDING TEST: CPT | Performed by: INTERNAL MEDICINE

## 2025-06-16 PROCEDURE — 82728 ASSAY OF FERRITIN: CPT | Performed by: INTERNAL MEDICINE

## 2025-06-16 PROCEDURE — 3079F DIAST BP 80-89 MM HG: CPT | Performed by: INTERNAL MEDICINE

## 2025-06-16 PROCEDURE — 84484 ASSAY OF TROPONIN QUANT: CPT | Performed by: INTERNAL MEDICINE

## 2025-06-16 PROCEDURE — 71046 X-RAY EXAM CHEST 2 VIEWS: CPT | Mod: TC | Performed by: RADIOLOGY

## 2025-06-16 PROCEDURE — 84443 ASSAY THYROID STIM HORMONE: CPT | Performed by: INTERNAL MEDICINE

## 2025-06-16 PROCEDURE — 82607 VITAMIN B-12: CPT | Performed by: INTERNAL MEDICINE

## 2025-06-16 PROCEDURE — 83540 ASSAY OF IRON: CPT | Performed by: INTERNAL MEDICINE

## 2025-06-16 PROCEDURE — 81003 URINALYSIS AUTO W/O SCOPE: CPT | Performed by: INTERNAL MEDICINE

## 2025-06-16 PROCEDURE — 93000 ELECTROCARDIOGRAM COMPLETE: CPT | Performed by: INTERNAL MEDICINE

## 2025-06-16 PROCEDURE — 3077F SYST BP >= 140 MM HG: CPT | Performed by: INTERNAL MEDICINE

## 2025-06-16 PROCEDURE — 99214 OFFICE O/P EST MOD 30 MIN: CPT | Performed by: INTERNAL MEDICINE

## 2025-06-16 PROCEDURE — 72100 X-RAY EXAM L-S SPINE 2/3 VWS: CPT | Mod: TC | Performed by: RADIOLOGY

## 2025-06-16 PROCEDURE — 84439 ASSAY OF FREE THYROXINE: CPT | Performed by: INTERNAL MEDICINE

## 2025-06-16 PROCEDURE — 36415 COLL VENOUS BLD VENIPUNCTURE: CPT | Performed by: INTERNAL MEDICINE

## 2025-06-16 RX ORDER — OXYCODONE HYDROCHLORIDE 5 MG/1
5 TABLET ORAL EVERY 4 HOURS PRN
Qty: 15 TABLET | Refills: 0 | Status: SHIPPED | OUTPATIENT
Start: 2025-06-16

## 2025-06-16 ASSESSMENT — ENCOUNTER SYMPTOMS: BACK PAIN: 1

## 2025-06-16 NOTE — TELEPHONE ENCOUNTER
S-(situation): Back pain started over the weekend.    B-(background): Patient had a lumbar steroid shot in her back several weeks ago. This has helped her back pain, but a new pain has started more on the right side of her lower back.     A-(assessment): Patient has dealt with this kind of pain in the past, and the injection has helped. She notes that this pain does not improve with laying down like the other pain she had before the injection. She rates it 6/10. Does not radiate down the leg or groin area. She also has this occasional chest pain that comes and goes, she has this frequently and she has gone to the ED for this. In the past it has been nothing serious but has been associated with UTI. She did recently have UA, 6/12 and she did not have UTI. Denies any difficulty breathing, nausea, vomiting, shortness of breath.     R-(recommendations): RN advised ED if chest pain is present and pain is severe, patient does not feel like her symptoms warrant ED at this time. She is agreeable to in person visit today at the [facility]. RN assisted in scheduling. Advised ED or UC if worsening symptoms. Patient agrees with plan.     Reason for Disposition   SEVERE back pain (e.g., excruciating, unable to do any normal activities) and not improved after pain medicine and CARE ADVICE    Additional Information   Negative: Passed out (e.g., fainted, lost consciousness, blacked out and was not responding)   Negative: Shock suspected (e.g., cold/pale/clammy skin, too weak to stand, low BP, rapid pulse)   Negative: Sounds like a life-threatening emergency to the triager   Negative: Major injury to the back (e.g., MVA, fall > 10 feet or 3 meters, penetrating injury, etc.)   Negative: Pain in the upper back over the ribs (rib cage) that radiates (travels) into the chest   Negative: Pain in the upper back over the ribs (rib cage) and worsened by coughing (or clearly increases with breathing)   Negative: Back pain during  pregnancy   Negative: SEVERE back pain of sudden onset and age > 60 years   Negative: SEVERE abdominal pain (e.g., excruciating)   Negative: Abdominal pain and age > 60 years   Negative: Unable to urinate (or only a few drops) and bladder feels very full   Negative: Loss of bladder or bowel control (urine or bowel incontinence; wetting self, leaking stool) of new-onset   Negative: Numbness (loss of sensation) in groin or rectal area   Negative: Pain radiates into groin, scrotum   Negative: Blood in urine (red, pink, or tea-colored)   Negative: Vomiting and pain over lower ribs of back (i.e., flank - kidney area)   Negative: Weakness of a leg or foot (e.g., unable to bear weight, dragging foot)   Negative: Patient sounds very sick or weak to the triager   Negative: Fever > 100.4 F (38.0 C) and flank pain   Negative: Pain or burning with passing urine (urination)    Protocols used: Back Pain-A-[place]

## 2025-06-16 NOTE — PROGRESS NOTES
"  Assessment & Plan     Lumbar pain and ches pain, unspecified type  Patient was noted to have an unremarkable UA that was not suggestive of infection. Given that she did have a recent procedure performed on her lumbar spine, we will proceed with repeat X ray imaging of lumbar spine. Images are currently pending. We did also proceed with an EKG given her chest pain, and it did reveal sinus bradycardia with no acute changes noted. She does have a BMP, CBC, and troponin that are pending. In the meantime, she was also provided with a small supply of oxycodone 5 mg tablets with instructions to take one tablet by mouth every 4 hours as needed for pain. She did receive 15 tablets with no refills.  - UA Macroscopic with reflex to Microscopic and Culture - Lab Collect  - XR Lumbar Spine 2/3 Views; Future  - CBC with platelets and differential; Future  - Basic metabolic panel  (Ca, Cl, CO2, Creat, Gluc, K, Na, BUN); Future  - Troponin T, High Sensitivity; Future  - EKG 12-lead complete w/read - Clinics  - XR Chest 2 Views; Future  - Troponin T, High Sensitivity          BMI  Estimated body mass index is 26.02 kg/m  as calculated from the following:    Height as of this encounter: 1.676 m (5' 6\").    Weight as of this encounter: 73.1 kg (161 lb 3.2 oz).   Weight management plan: Discussed healthy diet and exercise guidelines      Follow-up    Follow-up Visit   Expected date:  Jun 23, 2025 (Approximate)      Follow Up Appointment Details:     Follow-up with whom?: PCP    Follow-Up for what?: Acute Issue Recheck    How?: In Person    Is this an as-needed follow-up?: Yes                 Subjective   Carlie is a 55 year old, presenting for the following health issues:  Back Pain (dull ache, stabbing ,heavy lower mid back pain, sx 3 day) and Chest Pain        6/16/2025    12:32 PM   Additional Questions   Roomed by drew Loyola   Accompanied by self         6/16/2025    12:32 PM   Patient Reported Additional Medications   Patient " reports taking the following new medications none     Patient is a 55 year old female who presents to the clinic with a chief complaint of low back pain. She has had chronic issues with lumbar pain, but she did recently undergo a spinal epidural on 5/29/25. She reports that her pain did decrease significantly following the epidural. Unfortunately, she did develop a different pain in her low back a few days prior to presentation. Her pain is constant and located just to the right of her lumbar spine. It is non radiating. She denies any alleviating or exacerbating factors. She has had no change in bowel or bladder habits. She denies any fever, chills, or abdominal pain. She has tried ibuprofen, tylenol, and muscle relaxants with no improvement in her discomfort. She does report some intermittent chest tightness that began over the previous few days. She states that she has experienced these chest symptoms previously. Patient states that this is typically a sign of a UTI. She did have an unremarkable UTI on 6/12, but he symptoms did begin after that test.    Back Pain     History of Present Illness       Back Pain:  She presents for follow up of back pain. Patient's back pain is a new problem.    Original cause of back pain: other  First noticed back pain: 1-4 weeks ago  Patient feels back pain: constantlyLocation of back pain:  Right lower back and right middle of back  Description of back pain: dull ache, fullness and stabbing  Back pain spreads: nowhere    Since patient first noticed back pain, pain is: gradually worsening  Does back pain interfere with her job:  Yes  On a scale of 1-10 (10 being the worst), patient describes pain as:  7  What makes back pain worse: certain positions   Acupuncture: not tried  Acetaminophen: not helpful  Activity or exercise: not helpful  Chiropractor:  Not tried  Cold: not helpful  Heat: not helpful  Massage: not tried  Muscle relaxants: not helpful  NSAIDS: not tried  Opioids: not  "tried  Physical Therapy: not tried  Rest: not helpful  Steroid Injection: not tried  Stretching: not helpful  Surgery: not tried  TENS unit: not tried  Topical pain relievers: not helpful  Other healthcare providers patient is seeing for back pain: Pain specialist    Reason for visit:  Pain in back that has gotten progressively worse this weekend in addition to intermittent chest pain  Symptom onset:  1-3 days ago  Symptoms include:  Right back pain that does not go away with muscle relaxants or Tylenol. Is constant and does not go away with shifting body. In addition I have had intermittent chest pains  Symptom intensity:  Severe  Symptom progression:  Staying the same  Had these symptoms before:  Yes  Has tried/received treatment for these symptoms:  Yes  Previous treatment was successful:  No  What makes it worse:  No  What makes it better:  No   She is taking medications regularly.          Review of Systems  CONSTITUTIONAL: NEGATIVE for fever, chills, change in weight  INTEGUMENTARY/SKIN: NEGATIVE for worrisome rashes, moles or lesions  ENT/MOUTH: NEGATIVE for ear, mouth and throat problems  RESP: NEGATIVE for significant cough or SOB  CV: Positive for chest discomfort.  GI: NEGATIVE for nausea, abdominal pain, heartburn, or change in bowel habits  : NEGATIVE for frequency, dysuria, or hematuria  MUSCULOSKELETAL: Positive for lumbar pain.  NEURO: NEGATIVE for weakness, dizziness or paresthesias      Objective    BP (!) 152/82 (BP Location: Right arm, Patient Position: Sitting, Cuff Size: Adult Regular)   Pulse 66   Temp 97.9  F (36.6  C) (Oral)   Ht 1.676 m (5' 6\")   Wt 73.1 kg (161 lb 3.2 oz)   LMP 06/02/2011 (Exact Date)   SpO2 100%   BMI 26.02 kg/m    Body mass index is 26.02 kg/m .  Physical Exam  Vitals reviewed.   HENT:      Head: Normocephalic and atraumatic.      Mouth/Throat:      Mouth: Mucous membranes are moist.      Pharynx: Oropharynx is clear.   Eyes:      Extraocular Movements: " Extraocular movements intact.      Conjunctiva/sclera: Conjunctivae normal.      Pupils: Pupils are equal, round, and reactive to light.   Cardiovascular:      Rate and Rhythm: Normal rate and regular rhythm.      Pulses: Normal pulses.      Heart sounds: Normal heart sounds.   Pulmonary:      Effort: Pulmonary effort is normal.      Breath sounds: Normal breath sounds.   Musculoskeletal:      Cervical back: Normal.      Thoracic back: Normal.      Lumbar back: Tenderness present.        Back:    Skin:     General: Skin is warm.   Neurological:      Mental Status: She is alert.        Diagnostic testing: UA is unremarkable.  EKG shows sinus bradycardia.  BMP, troponin, chest x-ray, and lumbar x-rays are pending.        Signed Electronically by: Ollie Joseph MD

## 2025-06-17 ENCOUNTER — RESULTS FOLLOW-UP (OUTPATIENT)
Dept: INTERNAL MEDICINE | Facility: CLINIC | Age: 55
End: 2025-06-17

## 2025-06-17 DIAGNOSIS — E05.00 GRAVES DISEASE: ICD-10-CM

## 2025-06-17 RX ORDER — LEVOTHYROXINE SODIUM 100 UG/1
100 TABLET ORAL DAILY
Qty: 90 TABLET | Refills: 0 | Status: SHIPPED | OUTPATIENT
Start: 2025-06-17

## 2025-06-18 ENCOUNTER — LAB (OUTPATIENT)
Dept: LAB | Facility: CLINIC | Age: 55
End: 2025-06-18
Payer: COMMERCIAL

## 2025-06-18 DIAGNOSIS — R07.9 CHEST PAIN, UNSPECIFIED TYPE: ICD-10-CM

## 2025-06-18 LAB
ANION GAP SERPL CALCULATED.3IONS-SCNC: 10 MMOL/L (ref 7–15)
BASOPHILS # BLD AUTO: 0 10E3/UL (ref 0–0.2)
BASOPHILS NFR BLD AUTO: 0 %
BUN SERPL-MCNC: 11.4 MG/DL (ref 6–20)
CALCIUM SERPL-MCNC: 9.6 MG/DL (ref 8.8–10.4)
CHLORIDE SERPL-SCNC: 108 MMOL/L (ref 98–107)
CREAT SERPL-MCNC: 0.75 MG/DL (ref 0.51–0.95)
EGFRCR SERPLBLD CKD-EPI 2021: >90 ML/MIN/1.73M2
EOSINOPHIL # BLD AUTO: 0.2 10E3/UL (ref 0–0.7)
EOSINOPHIL NFR BLD AUTO: 3 %
ERYTHROCYTE [DISTWIDTH] IN BLOOD BY AUTOMATED COUNT: 15.2 % (ref 10–15)
GLUCOSE SERPL-MCNC: 86 MG/DL (ref 70–99)
HCO3 SERPL-SCNC: 24 MMOL/L (ref 22–29)
HCT VFR BLD AUTO: 34.4 % (ref 35–47)
HGB BLD-MCNC: 11 G/DL (ref 11.7–15.7)
IMM GRANULOCYTES # BLD: 0 10E3/UL
IMM GRANULOCYTES NFR BLD: 0 %
LYMPHOCYTES # BLD AUTO: 1.9 10E3/UL (ref 0.8–5.3)
LYMPHOCYTES NFR BLD AUTO: 35 %
MCH RBC QN AUTO: 25 PG (ref 26.5–33)
MCHC RBC AUTO-ENTMCNC: 32 G/DL (ref 31.5–36.5)
MCV RBC AUTO: 78 FL (ref 78–100)
MONOCYTES # BLD AUTO: 0.4 10E3/UL (ref 0–1.3)
MONOCYTES NFR BLD AUTO: 7 %
NEUTROPHILS # BLD AUTO: 2.9 10E3/UL (ref 1.6–8.3)
NEUTROPHILS NFR BLD AUTO: 55 %
PLATELET # BLD AUTO: 213 10E3/UL (ref 150–450)
POTASSIUM SERPL-SCNC: 4 MMOL/L (ref 3.4–5.3)
RBC # BLD AUTO: 4.4 10E6/UL (ref 3.8–5.2)
SODIUM SERPL-SCNC: 142 MMOL/L (ref 135–145)
WBC # BLD AUTO: 5.3 10E3/UL (ref 4–11)

## 2025-06-18 PROCEDURE — 36415 COLL VENOUS BLD VENIPUNCTURE: CPT

## 2025-06-18 PROCEDURE — 85025 COMPLETE CBC W/AUTO DIFF WBC: CPT

## 2025-06-18 PROCEDURE — 80048 BASIC METABOLIC PNL TOTAL CA: CPT

## 2025-06-18 NOTE — TELEPHONE ENCOUNTER
Given that she did have a recent corticosteroid injection, she may want to reach out to Dr. Leger in regards to her recurrent back pain.

## 2025-07-09 ENCOUNTER — HOSPITAL ENCOUNTER (OUTPATIENT)
Dept: CT IMAGING | Facility: CLINIC | Age: 55
Discharge: HOME | End: 2025-07-09
Attending: STUDENT IN AN ORGANIZED HEALTH CARE EDUCATION/TRAINING PROGRAM
Payer: COMMERCIAL

## 2025-07-09 DIAGNOSIS — N28.1 RENAL CYST, LEFT: ICD-10-CM

## 2025-07-09 PROCEDURE — 250N000011 HC RX IP 250 OP 636: Performed by: STUDENT IN AN ORGANIZED HEALTH CARE EDUCATION/TRAINING PROGRAM

## 2025-07-09 PROCEDURE — 250N000009 HC RX 250: Performed by: STUDENT IN AN ORGANIZED HEALTH CARE EDUCATION/TRAINING PROGRAM

## 2025-07-09 PROCEDURE — 74178 CT ABD&PLV WO CNTR FLWD CNTR: CPT

## 2025-07-09 RX ORDER — IOPAMIDOL 755 MG/ML
500 INJECTION, SOLUTION INTRAVASCULAR ONCE
Status: COMPLETED | OUTPATIENT
Start: 2025-07-09 | End: 2025-07-09

## 2025-07-09 RX ADMIN — SODIUM CHLORIDE 100 ML: 9 INJECTION, SOLUTION INTRAVENOUS at 13:20

## 2025-07-09 RX ADMIN — IOPAMIDOL 91 ML: 755 INJECTION, SOLUTION INTRAVENOUS at 13:20

## (undated) DEVICE — TRAY PAIN INJECTION 97A 640

## (undated) DEVICE — DRSG PRIMAPORE 03 1/8X6" 66000318

## (undated) DEVICE — GLOVE PROTEXIS POWDER FREE SMT 7.0  2D72PT70X

## (undated) DEVICE — NDL SPINAL 22GA 5" QUINCKE 405148

## (undated) DEVICE — CANNULA MONOPOLAR CVD 100ML 20GA 0406-630-125

## (undated) DEVICE — KIT ENDO TURNOVER/PROCEDURE W/CLEAN A SCOPE LINERS 103888

## (undated) DEVICE — ESU CANNULA RF MONOPLOAR CVD 20GA 10X150MM 0406-630-225

## (undated) DEVICE — PREP CHLORAPREP W/ORANGE TINT 10.5ML 260715

## (undated) DEVICE — CANNULA MONOPOLAR CVD 50ML 20GA 0406-630-015

## (undated) RX ORDER — DIAZEPAM 5 MG
TABLET ORAL
Status: DISPENSED
Start: 2022-10-12

## (undated) RX ORDER — DIAZEPAM 5 MG
TABLET ORAL
Status: DISPENSED
Start: 2022-10-05

## (undated) RX ORDER — FENTANYL CITRATE 50 UG/ML
INJECTION, SOLUTION INTRAMUSCULAR; INTRAVENOUS
Status: DISPENSED
Start: 2018-07-10